# Patient Record
Sex: FEMALE | Race: ASIAN | NOT HISPANIC OR LATINO | URBAN - METROPOLITAN AREA
[De-identification: names, ages, dates, MRNs, and addresses within clinical notes are randomized per-mention and may not be internally consistent; named-entity substitution may affect disease eponyms.]

---

## 2019-04-21 ENCOUNTER — EMERGENCY (EMERGENCY)
Facility: HOSPITAL | Age: 54
LOS: 1 days | Discharge: HOME | End: 2019-04-21
Attending: EMERGENCY MEDICINE | Admitting: EMERGENCY MEDICINE
Payer: COMMERCIAL

## 2019-04-21 VITALS
SYSTOLIC BLOOD PRESSURE: 165 MMHG | OXYGEN SATURATION: 99 % | HEART RATE: 74 BPM | RESPIRATION RATE: 16 BRPM | TEMPERATURE: 98 F | DIASTOLIC BLOOD PRESSURE: 76 MMHG

## 2019-04-21 PROCEDURE — 73070 X-RAY EXAM OF ELBOW: CPT | Mod: 26,RT

## 2019-04-21 PROCEDURE — ZZZZZ: CPT

## 2019-04-21 PROCEDURE — 73090 X-RAY EXAM OF FOREARM: CPT | Mod: 26,RT

## 2019-04-21 PROCEDURE — 99283 EMERGENCY DEPT VISIT LOW MDM: CPT

## 2019-04-21 RX ORDER — ACETAMINOPHEN 500 MG
650 TABLET ORAL ONCE
Qty: 0 | Refills: 0 | Status: COMPLETED | OUTPATIENT
Start: 2019-04-21 | End: 2019-04-21

## 2019-04-21 RX ADMIN — Medication 650 MILLIGRAM(S): at 16:15

## 2019-04-21 NOTE — ED PROVIDER NOTE - PHYSICAL EXAMINATION
Vitals: WNL  Gen: laying comfortably in NAD  Head: NCAT  ENT: sclerae white, anicterus, moist mucous membranes. No exudates. Neck supple, no LAD,  no carotid bruits, no JVD  CV: RRR. Audible S1 and S2. No murmurs, rubs, gallops, S3, nor S4, 2+ radial and DP pulses   Pulm: Clear to auscultation bilaterally. No wheezes, rales, or rhonchi  Abd: soft, normoactive BS x4, NTND, no rebound, no guarding, no rashes  Musculoskeletal:  RUE: mild swelling R forearm, no tendneress to R elbow, no ecchymosis, no obvious defomity, motor/sensation of RUE intact,  strength b/l UE intact, no shoulder pain, full ROM R elbow  Skin: no lesions or scars noted  Neurologic: AAOx3

## 2019-04-21 NOTE — ED PROVIDER NOTE - OBJECTIVE STATEMENT
52yo F h/o htn p/w R forearm pain s/p fall, tripped on garden hose, didn't hit head, landed on R arm outstretched. since then has had pain in R arm, still able to move arm. hasn't taken anything for it. no shoulder pain. no numbness or weakness.

## 2019-04-21 NOTE — ED PROVIDER NOTE - NS ED ROS FT
Constitutional: no fevers, chills  HEENT: no visual changes, no sore throat, no rhinorrhea  CV: no cp  Resp: no sob  GI: no abd pain, n/v, diarrhea/constipation  : no dysuria, hematuria  MSK: +R forearm pain  skin: no rashes  neuro: no HA, no confusion  psych: no SI/HI  heme: no LAD

## 2019-04-21 NOTE — ED PROVIDER NOTE - ATTENDING CONTRIBUTION TO CARE
agree with resident note  "52yo F h/o htn p/w R forearm pain s/p fall, tripped on garden hose, didn't hit head, landed on R arm outstretched. since then has had pain in R arm, still able to move arm. hasn't taken anything for it. no shoulder pain. no numbness or weakness."    PE: well appearing; NCAT; ext: right arm no point tenderness but some swelling; no anatomical snuffbox tenderness    imp: contusion vs fx; will get xray and reassess

## 2019-04-21 NOTE — ED PROVIDER NOTE - CLINICAL SUMMARY MEDICAL DECISION MAKING FREE TEXT BOX
52yo F h/o htn p/w R forearm pain s/p fall, tripped on garden hose, didn't hit head, landed on R arm outstretched. xray, pain control.

## 2022-02-01 NOTE — ED PROVIDER NOTE - NS ED ATTENDING STATEMENT MOD
I have personally seen and examined this patient.  I have fully participated in the care of this patient. I have reviewed all pertinent clinical information, including history, physical exam, plan and the Resident’s note and agree except as noted.
36.2

## 2022-11-16 ENCOUNTER — INPATIENT (INPATIENT)
Facility: HOSPITAL | Age: 57
LOS: 1 days | Discharge: HOME | End: 2022-11-18
Attending: INTERNAL MEDICINE | Admitting: INTERNAL MEDICINE
Payer: COMMERCIAL

## 2022-11-16 VITALS
OXYGEN SATURATION: 100 % | DIASTOLIC BLOOD PRESSURE: 93 MMHG | HEART RATE: 84 BPM | SYSTOLIC BLOOD PRESSURE: 192 MMHG | RESPIRATION RATE: 17 BRPM | TEMPERATURE: 98 F

## 2022-11-16 LAB
ALBUMIN SERPL ELPH-MCNC: 5.2 G/DL — HIGH (ref 3.3–5)
ALP SERPL-CCNC: 102 U/L — SIGNIFICANT CHANGE UP (ref 40–120)
ALT FLD-CCNC: 36 U/L — HIGH (ref 4–33)
ANION GAP SERPL CALC-SCNC: 18 MMOL/L — HIGH (ref 7–14)
AST SERPL-CCNC: 34 U/L — HIGH (ref 4–32)
BASOPHILS # BLD AUTO: 0.03 K/UL — SIGNIFICANT CHANGE UP (ref 0–0.2)
BASOPHILS NFR BLD AUTO: 0.4 % — SIGNIFICANT CHANGE UP (ref 0–2)
BILIRUB SERPL-MCNC: 0.5 MG/DL — SIGNIFICANT CHANGE UP (ref 0.2–1.2)
BUN SERPL-MCNC: 11 MG/DL — SIGNIFICANT CHANGE UP (ref 7–23)
CALCIUM SERPL-MCNC: 10.4 MG/DL — SIGNIFICANT CHANGE UP (ref 8.4–10.5)
CHLORIDE SERPL-SCNC: 100 MMOL/L — SIGNIFICANT CHANGE UP (ref 98–107)
CO2 SERPL-SCNC: 22 MMOL/L — SIGNIFICANT CHANGE UP (ref 22–31)
CREAT SERPL-MCNC: 0.75 MG/DL — SIGNIFICANT CHANGE UP (ref 0.5–1.3)
EGFR: 93 ML/MIN/1.73M2 — SIGNIFICANT CHANGE UP
EOSINOPHIL # BLD AUTO: 0.1 K/UL — SIGNIFICANT CHANGE UP (ref 0–0.5)
EOSINOPHIL NFR BLD AUTO: 1.3 % — SIGNIFICANT CHANGE UP (ref 0–6)
FLUAV AG NPH QL: SIGNIFICANT CHANGE UP
FLUBV AG NPH QL: SIGNIFICANT CHANGE UP
GLUCOSE SERPL-MCNC: 95 MG/DL — SIGNIFICANT CHANGE UP (ref 70–99)
HCG SERPL-ACNC: <5 MIU/ML — SIGNIFICANT CHANGE UP
HCT VFR BLD CALC: 47.4 % — HIGH (ref 34.5–45)
HGB BLD-MCNC: 16.1 G/DL — HIGH (ref 11.5–15.5)
IANC: 4.08 K/UL — SIGNIFICANT CHANGE UP (ref 1.8–7.4)
IMM GRANULOCYTES NFR BLD AUTO: 0.3 % — SIGNIFICANT CHANGE UP (ref 0–0.9)
LIDOCAIN IGE QN: 54 U/L — SIGNIFICANT CHANGE UP (ref 7–60)
LYMPHOCYTES # BLD AUTO: 3.08 K/UL — SIGNIFICANT CHANGE UP (ref 1–3.3)
LYMPHOCYTES # BLD AUTO: 40.5 % — SIGNIFICANT CHANGE UP (ref 13–44)
MAGNESIUM SERPL-MCNC: 2.2 MG/DL — SIGNIFICANT CHANGE UP (ref 1.6–2.6)
MCHC RBC-ENTMCNC: 28.1 PG — SIGNIFICANT CHANGE UP (ref 27–34)
MCHC RBC-ENTMCNC: 34 GM/DL — SIGNIFICANT CHANGE UP (ref 32–36)
MCV RBC AUTO: 82.9 FL — SIGNIFICANT CHANGE UP (ref 80–100)
MONOCYTES # BLD AUTO: 0.3 K/UL — SIGNIFICANT CHANGE UP (ref 0–0.9)
MONOCYTES NFR BLD AUTO: 3.9 % — SIGNIFICANT CHANGE UP (ref 2–14)
NEUTROPHILS # BLD AUTO: 4.08 K/UL — SIGNIFICANT CHANGE UP (ref 1.8–7.4)
NEUTROPHILS NFR BLD AUTO: 53.6 % — SIGNIFICANT CHANGE UP (ref 43–77)
NRBC # BLD: 0 /100 WBCS — SIGNIFICANT CHANGE UP (ref 0–0)
NRBC # FLD: 0 K/UL — SIGNIFICANT CHANGE UP (ref 0–0)
NT-PROBNP SERPL-SCNC: 20 PG/ML — SIGNIFICANT CHANGE UP
PLATELET # BLD AUTO: 282 K/UL — SIGNIFICANT CHANGE UP (ref 150–400)
POTASSIUM SERPL-MCNC: 4.3 MMOL/L — SIGNIFICANT CHANGE UP (ref 3.5–5.3)
POTASSIUM SERPL-SCNC: 4.3 MMOL/L — SIGNIFICANT CHANGE UP (ref 3.5–5.3)
PROT SERPL-MCNC: 8.5 G/DL — HIGH (ref 6–8.3)
RBC # BLD: 5.72 M/UL — HIGH (ref 3.8–5.2)
RBC # FLD: 11.8 % — SIGNIFICANT CHANGE UP (ref 10.3–14.5)
RSV RNA NPH QL NAA+NON-PROBE: SIGNIFICANT CHANGE UP
SARS-COV-2 RNA SPEC QL NAA+PROBE: SIGNIFICANT CHANGE UP
SODIUM SERPL-SCNC: 140 MMOL/L — SIGNIFICANT CHANGE UP (ref 135–145)
TROPONIN T, HIGH SENSITIVITY RESULT: 9 NG/L — SIGNIFICANT CHANGE UP
WBC # BLD: 7.61 K/UL — SIGNIFICANT CHANGE UP (ref 3.8–10.5)
WBC # FLD AUTO: 7.61 K/UL — SIGNIFICANT CHANGE UP (ref 3.8–10.5)

## 2022-11-16 PROCEDURE — 93010 ELECTROCARDIOGRAM REPORT: CPT

## 2022-11-16 PROCEDURE — 99285 EMERGENCY DEPT VISIT HI MDM: CPT

## 2022-11-16 PROCEDURE — 71045 X-RAY EXAM CHEST 1 VIEW: CPT | Mod: 26

## 2022-11-16 RX ORDER — ASPIRIN/CALCIUM CARB/MAGNESIUM 324 MG
162 TABLET ORAL ONCE
Refills: 0 | Status: COMPLETED | OUTPATIENT
Start: 2022-11-16 | End: 2022-11-16

## 2022-11-16 RX ORDER — ACETAMINOPHEN 500 MG
650 TABLET ORAL ONCE
Refills: 0 | Status: COMPLETED | OUTPATIENT
Start: 2022-11-16 | End: 2022-11-16

## 2022-11-16 RX ADMIN — Medication 162 MILLIGRAM(S): at 21:59

## 2022-11-16 RX ADMIN — Medication 650 MILLIGRAM(S): at 21:59

## 2022-11-16 NOTE — ED ADULT NURSE NOTE - NSFALLRSKHARMRISK_ED_ALL_ED
Discussed the importance of follow up care and seeking immediate medical attention for any changes or worsening in the patients condition. Patient verbalized understanding of discharge paperwork, medication use and follow up care.      Patient discharged with scripts for Keflex and Pepcid no

## 2022-11-16 NOTE — ED ADULT NURSE NOTE - OBJECTIVE STATEMENT
Patient A&Ox4 ambulatory c/o chest pain described as "crushing pain" x2 months. Patient states usually occurs in AM, intense pain upon awakening, feels like crushing pain that lasts a few min. Denies cardiac work up. Hx HTN compliant with medications. On arrival to ED, patient no longer has chest pain. Endorsing headache. Respirations even and unlabored. Placed on cardiac monitor-NS. Patient denies sob, n/v/d, leg swelling, urinary symptoms and fvers/chills. 20G IV observed to left arm from EMS. Labs collected and sent to lab. Stretcher in lowest position, wheels locked, appropriate side rails in place, call bell in reach.

## 2022-11-16 NOTE — ED ADULT NURSE NOTE - CHIEF COMPLAINT QUOTE
pt arrives from cardiologist with c/o intermittent chest pain x 2 months. given 1 s/l nitro, 324 asa and #20 s/l L f/a

## 2022-11-16 NOTE — ED PROVIDER NOTE - PHYSICAL EXAMINATION
CONSTITUTIONAL: NAD  ENT: MMM  NECK: Supple; non tender.  CARD: RRR  RESP: CTAB  ABD: S/NT no R/G  EXT: no pedal edema  NEURO: Grossly unremarkable  PSYCH: Cooperative, appropriate. CONSTITUTIONAL: NAD  ENT: MMM  NECK: Supple; non tender.  CARD: RRR  RESP: CTAB  ABD: S/NT no R/G  EXT: no pedal edema  NEURO: Grossly unremarkable  PSYCH: Cooperative, appropriate.  ATTENDING PHYSICAL EXAM  GEN - NAD; well appearing; A+O x3  HEAD - NC/AT; EYES/NOSE - PERRL, EOMI, mucous membranes moist, no discharge; THROAT: Oral cavity and pharynx normal. No inflammation, swelling, exudate, or lesions  NECK: Neck supple, non-tender without lymphadenopathy, no masses, no JVD  PULMONARY - CTA b/l, symmetric breath sounds, no w/r/r  CARDIAC -s1s2, RRR, no M,R,G  ABDOMEN - +NABS, ND, NT, soft, no guarding, no rebound, no masses   BACK - no CVA tenderness, No vertebral or paravertebral tenderness  EXTREMITIES - symmetric pulses, 2+ dp, capillary refill < 2 seconds, no clubbing, no cyanosis, no edema  SKIN - no rash or bruising   NEUROLOGIC - alert, CN 2-12 intact, no focal deficits

## 2022-11-16 NOTE — ED PROVIDER NOTE - CLINICAL SUMMARY MEDICAL DECISION MAKING FREE TEXT BOX
Mirza PGY2: 58yo F with PMH of HTN presents to ED for eval of atypical AMchest pain but worsening and lengthing in intensity. Concern for ACS vs ?Prinzmetal vs ?GERD. EKG TWI in I, avL, V2-V5. Not actively in pain. Plan for ACS eval and admit to tele doc.      Star [phone]

## 2022-11-16 NOTE — ED PROVIDER NOTE - OBJECTIVE STATEMENT
58yo F with PMH of HTN presents to ED for eval of chest pain. For last 2mo she's had AM intense pain upon awakening, feels like crushing pain that lasts a few min. Since it always resolved, initially did not seek eval. Said it happens every day and if doesn't occur in morning feels she has some lesser episodes in the day instead. No cards hx, no smoking, does not know family hx. Assoc w/ nausea, but no diaphoresis/SOB. No exertional pains otherwise. However this has been progressively worsening in length and intensity so went to [facility]ologist. EKG today in office showed some TWI in lat leads so given /NG and sent to ED. No hx of heart burn. 56yo F with PMH of HTN presents to ED for eval of chest pain. For last 2mo she's had AM intense pain upon awakening, feels like crushing pain that lasts a few min. Since it always resolved, initially did not seek eval. Said it happens every day and if doesn't occur in morning feels she has some lesser episodes in the day instead. No cards hx, no smoking, does not know family hx. Assoc w/ nausea, but no diaphoresis/SOB. No exertional pains otherwise. However this has been progressively worsening in length and intensity so went to [facility]. EKG today in office showed some TWI in lat leads so given /NG and sent to ED. No hx of heart burn.  Attending - Agree with above.  I evaluated patient myself., 57-year-old female with past medical history of hypertension.  Denies history of coronary artery disease in past.  Reports episodes of midsternal chest pain that occurs almost every day since September.  Pain usually occurs in the morning after waking up.  Pain sometimes occurs at other times during the day.  Pain typically lasts for 5 to 10 minutes and resolve spontaneously.  Denies associated shortness of breath, diaphoresis, nausea, lightheadedness.  To PMD today for the first time for evaluation of this pain, and referred to ED for eval.  No pain currently.

## 2022-11-16 NOTE — ED PROVIDER NOTE - NS ED ROS FT
Constitutional:  See HPI  ENMT: No neck pain or stiffness  Cardiac:  +chest pain  Respiratory:  No cough or respiratory distress.   GI:  No nausea, vomiting, diarrhea or abdominal pain.  MS:  No back pain.  Except as documented in the HPI,  all other systems are negative

## 2022-11-17 DIAGNOSIS — Z90.49 ACQUIRED ABSENCE OF OTHER SPECIFIED PARTS OF DIGESTIVE TRACT: Chronic | ICD-10-CM

## 2022-11-17 DIAGNOSIS — Z98.891 HISTORY OF UTERINE SCAR FROM PREVIOUS SURGERY: Chronic | ICD-10-CM

## 2022-11-17 DIAGNOSIS — R07.9 CHEST PAIN, UNSPECIFIED: ICD-10-CM

## 2022-11-17 LAB
CK MB BLD-MCNC: 1.6 % — SIGNIFICANT CHANGE UP (ref 0–2.5)
CK MB CFR SERPL CALC: 1.1 NG/ML — SIGNIFICANT CHANGE UP
CK SERPL-CCNC: 70 U/L — SIGNIFICANT CHANGE UP (ref 25–170)
TROPONIN T, HIGH SENSITIVITY RESULT: 7 NG/L — SIGNIFICANT CHANGE UP

## 2022-11-17 PROCEDURE — 0715T: CPT

## 2022-11-17 PROCEDURE — 93018 CV STRESS TEST I&R ONLY: CPT | Mod: GC

## 2022-11-17 PROCEDURE — 93010 ELECTROCARDIOGRAM REPORT: CPT

## 2022-11-17 PROCEDURE — 78452 HT MUSCLE IMAGE SPECT MULT: CPT | Mod: 26

## 2022-11-17 PROCEDURE — 99222 1ST HOSP IP/OBS MODERATE 55: CPT

## 2022-11-17 PROCEDURE — 99236 HOSP IP/OBS SAME DATE HI 85: CPT

## 2022-11-17 PROCEDURE — 93016 CV STRESS TEST SUPVJ ONLY: CPT | Mod: GC

## 2022-11-17 PROCEDURE — 93306 TTE W/DOPPLER COMPLETE: CPT | Mod: 26

## 2022-11-17 RX ORDER — INFLUENZA VIRUS VACCINE 15; 15; 15; 15 UG/.5ML; UG/.5ML; UG/.5ML; UG/.5ML
0.5 SUSPENSION INTRAMUSCULAR ONCE
Refills: 0 | Status: DISCONTINUED | OUTPATIENT
Start: 2022-11-17 | End: 2022-11-18

## 2022-11-17 RX ORDER — AMLODIPINE BESYLATE 2.5 MG/1
10 TABLET ORAL DAILY
Refills: 0 | Status: DISCONTINUED | OUTPATIENT
Start: 2022-11-17 | End: 2022-11-18

## 2022-11-17 RX ORDER — ASPIRIN/CALCIUM CARB/MAGNESIUM 324 MG
81 TABLET ORAL DAILY
Refills: 0 | Status: DISCONTINUED | OUTPATIENT
Start: 2022-11-17 | End: 2022-11-18

## 2022-11-17 RX ORDER — CLOPIDOGREL BISULFATE 75 MG/1
75 TABLET, FILM COATED ORAL DAILY
Refills: 0 | Status: DISCONTINUED | OUTPATIENT
Start: 2022-11-17 | End: 2022-11-18

## 2022-11-17 RX ORDER — SODIUM CHLORIDE 9 MG/ML
1000 INJECTION INTRAMUSCULAR; INTRAVENOUS; SUBCUTANEOUS
Refills: 0 | Status: DISCONTINUED | OUTPATIENT
Start: 2022-11-17 | End: 2022-11-18

## 2022-11-17 RX ORDER — ATORVASTATIN CALCIUM 80 MG/1
40 TABLET, FILM COATED ORAL AT BEDTIME
Refills: 0 | Status: DISCONTINUED | OUTPATIENT
Start: 2022-11-17 | End: 2022-11-18

## 2022-11-17 RX ORDER — LISINOPRIL 2.5 MG/1
40 TABLET ORAL DAILY
Refills: 0 | Status: DISCONTINUED | OUTPATIENT
Start: 2022-11-17 | End: 2022-11-18

## 2022-11-17 RX ADMIN — LISINOPRIL 40 MILLIGRAM(S): 2.5 TABLET ORAL at 22:03

## 2022-11-17 RX ADMIN — AMLODIPINE BESYLATE 10 MILLIGRAM(S): 2.5 TABLET ORAL at 19:08

## 2022-11-17 RX ADMIN — SODIUM CHLORIDE 150 MILLILITER(S): 9 INJECTION INTRAMUSCULAR; INTRAVENOUS; SUBCUTANEOUS at 16:53

## 2022-11-17 RX ADMIN — ATORVASTATIN CALCIUM 40 MILLIGRAM(S): 80 TABLET, FILM COATED ORAL at 22:04

## 2022-11-17 NOTE — PATIENT PROFILE ADULT - FALL HARM RISK - HARM RISK INTERVENTIONS

## 2022-11-17 NOTE — CONSULT NOTE ADULT - SUBJECTIVE AND OBJECTIVE BOX
HPI:  admitted with several weeks of wakening with severe chest pressure  no associated symptoms ecg with STT changes laterally  echo normal  stress test cp with exertion st depression LAD ischemia    MEDICATIONS:  MEDICATIONS  (STANDING):      PHYSICAL EXAM:  T(C): 36.7 (11-17-22 @ 08:58), Max: 36.8 (11-16-22 @ 18:44)  HR: 71 (11-17-22 @ 08:58) (61 - 88)  BP: 153/79 (11-17-22 @ 08:58) (126/72 - 192/93)  RR: 18 (11-17-22 @ 08:58) (12 - 18)  SpO2: 100% (11-17-22 @ 08:58) (97% - 100%)  Wt(kg): --  I&O's Summary        Appearance: Normal	NAD  HEENT:   Normal oral mucosa, PERRL, EOMI	  Cardiovascular: Normal S1 S2, No JVD, No murmurs ,  Respiratory: Lungs clear to auscultation, normal effort 	  Gastrointestinal:  Soft, Non-tender, + BS	  Skin: No rashes, No ecchymoses, No cyanosis, warm to touch  Musculoskeletal: Normal range of motion, normal strength  Psychiatry:  Mood & affect appropriate  Ext: No edema  Peripheral pulses palpable 2+ bilaterally      LABS:    CARDIAC MARKERS:  CARDIAC MARKERS ( 17 Nov 2022 00:33 )  x     / x     / 70 U/L / x     / 1.1 ng/mL                                16.1   7.61  )-----------( 282      ( 16 Nov 2022 21:30 )             47.4     11-16    140  |  100  |  11  ----------------------------<  95  4.3   |  22  |  0.75    Ca    10.4      16 Nov 2022 21:30  Mg     2.20     11-16    TPro  8.5<H>  /  Alb  5.2<H>  /  TBili  0.5  /  DBili  x   /  AST  34<H>  /  ALT  36<H>  /  AlkPhos  102  11-16    proBNP: Serum Pro-Brain Natriuretic Peptide: 20 pg/mL (11-16 @ 21:30)    Lipid Profile:   HgA1c:   TSH:           TELEMETRY: 	    ECG: NSR nonspecific STT changes laterally 	  RADIOLOGY:   < from: Transthoracic Echocardiogram (11.17.22 @ 06:36) >  CONCLUSIONS:  1. Normal mitral valve. Minimal mitral regurgitation.  2. Normal left ventricular internal dimensions and wall  thicknesses.  3. Normal left ventricular systolic function. No segmental  wall motion abnormalities.  4. Mild diastolic dysfunction (Stage I).  5. Normal right ventricular size and function.  ------------------------------------------------------------------------  Confirmed on  11/17/2022 - 10:21:10 by Oren Ovalle M.D.,  [facility]  < from: Nuclear Stress Test-Exercise (Nuclear Stress Test-Exercise .) (11.17.22 @ 08:23) >  IMPRESSIONS:Abnormal Study  * Myocardial Perfusion SPECT results are abnormal.  * Reviewof raw data shows: The study is of good technical  quality.  * The left ventricle was small in size. There is a medium  sized, mild to moderate defect in anteroseptal wall  suggesting proximal LAD disease.  * Post-stress gated wall motion analysis was performed  (LVEF = 66 %;LVEDV = 46 ml.), revealing normal LV  function. There was mildly diminished anteroseptal  systolic thickening with normal wall motion. The other  segments and RV contracte well.  * NOTE: the patient developed grade 4 angina with low  level exercise. There was 1 mm horizontal ST-segment  depression in leads II , III, aVF, V4, V5, V6 started at  00:54 min of recovery at HR of 115 and persisted 03:50 min  into recovery  * Based upon the above findings, coronary angiography  suggested. Rest imaging will not be done.  ------------------------------------------------------------------------  Confirmed on  11/17/2022 - 11:03:37 by Malik Severino M.D.  ------------------------------------------------------------------------    < end of copied text >

## 2022-11-17 NOTE — ED CDU PROVIDER INITIAL DAY NOTE - PROGRESS NOTE DETAILS
SERGIO Payton: pt seen this AM by cardiology Dr Pickett advised to obtain stress and echo.  Stress lab called pt with +stress test, s/w Dr Pickett advised to admit to [facility] hospitalist.  Pt resting comfortably NAD, pt denies chest pain/sob. SERGIO Payton: pt seen this AM by cardiology Dr Pickett advised to obtain stress and echo.  Stress lab called pt with +stress test, s/w Dr Pickett advised to admit to [facility] hospitalist.  Pt resting comfortably NAD, pt denies chest pain/sob.  MAR notified.

## 2022-11-17 NOTE — CONSULT NOTE ADULT - ASSESSMENT
1. Chest pressure at rest  2. abnormal stress test with LAD ischemia  3. risk factors hypertension hyperlipidemia    Recommend  proceed with cardiac cath  risks benefits and alterantives discussed

## 2022-11-17 NOTE — ED CDU PROVIDER DISPOSITION NOTE - CLINICAL COURSE
57F h/o HTN p/w intermittent pressure-like chest pain upon awakening x2 months, becoming more frequent and a/w nausea. Pt assessed at PMD office prior to arrival and sent to ED due to change in EKG showing new TWI in lateral leads. In the ED, EKG NSR, TWI in avl and I ,V3 and V4, CXR unremarkable, troponin 9-->7, CBC and CMP unremarkable. Pt placed in CDU for tele monitoring, TTE, stress test, and cardiology evaluation. Pt underwent stress this AM and noted to have perfusion abnormality in LAD distribution with minimal activity. Cardiology rec forgoing resting imaging and proceeding to cardiac cath. D/w Dr. Pickett who had seen the pt this AM and agreed with plan. [facility], accepted by Dr. Gustafson.

## 2022-11-17 NOTE — ED CDU PROVIDER INITIAL DAY NOTE - PHYSICAL EXAMINATION
Vital signs reviewed.   CONSTITUTIONAL: Well-appearing; well-nourished; in no apparent distress. Non-toxic appearing.   HEAD: Normocephalic, atraumatic.  EYES: PERRL, EOM intact, conjunctiva and sclera WNL..  CARD: Normal S1, S2; no murmurs, rubs, or gallops noted.  RESP: Normal chest excursion with respiration; breath sounds clear and equal bilaterally; no wheezes, rhonchi, or rales.  ABD/GI: soft, non-distended; non-tender; no palpable organomegaly, no pulsatile mass.  EXT/MS: moves all extremities; distal pulses are normal, no pedal edema.  SKIN: Normal for age and race; warm; dry; good turgor; no apparent lesions or exudate noted.  NEURO: Awake, alert, oriented x 3, no gross deficits, CN II-XII grossly intact, no motor or sensory deficit noted. Vital signs reviewed.   CONSTITUTIONAL: Well-appearing; well-nourished; in no apparent distress. Non-toxic appearing.   HEAD: Normocephalic, atraumatic.  EYES: PERRL, EOM intact, conjunctiva and sclera WNL..  CARD: Normal S1, S2; no murmurs, rubs, or gallops noted.  RESP: Normal chest excursion with respiration; breath sounds clear and equal bilaterally; no wheezes, rhonchi, or rales.  ABD/GI: soft, non-distended; non-tender; no palpable organomegaly, no pulsatile mass.  EXT/MS: moves all extremities; distal pulses are normal, no pedal edema.  SKIN: Normal for age and race; warm; dry; good turgor; no apparent lesions or exudate noted.  NEURO: Awake, alert, oriented x 3, no gross deficits, CN II-XII grossly intact, no motor or sensory deficit noted.  ATTENDING PHYSICAL EXAM  GEN - NAD; well appearing; A+O x3  HEAD - NC/AT; EYES/NOSE - PERRL, EOMI, mucous membranes moist, no discharge; THROAT: Oral cavity and pharynx normal. No inflammation, swelling, exudate, or lesions  NECK: Neck supple, non-tender without lymphadenopathy, no masses, no JVD  PULMONARY - CTA b/l, symmetric breath sounds, no w/r/r  CARDIAC -s1s2, RRR, no M,R,G  ABDOMEN - +NABS, ND, NT, soft, no guarding, no rebound, no masses   BACK - no CVA tenderness, No vertebral or paravertebral tenderness  EXTREMITIES - symmetric pulses, 2+ dp, capillary refill < 2 seconds, no clubbing, no cyanosis, no edema

## 2022-11-17 NOTE — CHART NOTE - NSCHARTNOTEFT_GEN_A_CORE
Right radial site s/p LHC procedure is without bleeding or hematoma. Dressing dry, clean and intact. Vital signs stable. BL radial and brachial pulses palpable and symmetric. Sensation, strength, and ROM equal bilaterally. Patient denies chest pain, SOB, numbness, and tingling. Will continue to monitor.

## 2022-11-17 NOTE — ED CDU PROVIDER INITIAL DAY NOTE - MEDICAL DECISION MAKING DETAILS
57-year-old female non-smoker but reports a history of secondhand smoke exposure with a past medical history of hypertension presents the ER complaining of intermittent pressure-like chest pain upon awakening for the past 2 months, becoming more frequent associated nausea patient was evaluated by PMD today, EKG showing TWI in lateral leads patient sent to ER for evaluation.  In the ED patient EKG NSR, TWI in avl and I ,V3 and V4. chest x-ray clear lungs, troponin 9 and 7, CBC and CMP not acutely actionable.  Patient placed in CDU for telemetry, echocardiogram, stress test, telemetry doc of the day evaluation.  Patient reports no active symptoms/chest pain

## 2022-11-17 NOTE — PATIENT PROFILE ADULT - MONEY FOR FOOD
No complaints
no

## 2022-11-17 NOTE — H&P CARDIOLOGY - REVIEW OF SYSTEMS
The patient denies SOB, palpitations, dizziness, presyncope, syncope,  headache, visual disturbances, CVA, PE, DVT, MARIA INES, abdominal pain, N/V/D/C, hematochezia, melena, dysuria, hematuria, fever, chills.

## 2022-11-17 NOTE — ED CDU PROVIDER INITIAL DAY NOTE - OBJECTIVE STATEMENT
56yo F with PMH of HTN presents to ED for eval of chest pain. For last 2mo she's had AM intense pain upon awakening, feels like crushing pain that lasts a few min. Since it always resolved, initially did not seek eval. Said it happens every day and if doesn't occur in morning feels she has some lesser episodes in the day instead. No cards hx, no smoking, does not know family hx. Assoc w/ nausea, but no diaphoresis/SOB. No exertional pains otherwise. However this has been progressively worsening in length and intensity so went to [facility]. EKG today in office showed some TWI in lat leads so given /NG and sent to ED. No hx of heart burn    CDU SERGIO Estrada: Agree with above note.  Patient is a 57-year-old female non-smoker but reports a history of secondhand smoke exposure with a past medical history of hypertension presents the ER complaining of intermittent pressure-like chest pain upon awakening for the past 2 months, becoming more frequent associated nausea patient was evaluated by PMD today, EKG showing TWI in lateral leads patient sent to ER for evaluation. In the ED patient EKG NSR, TWI in avl and I ,V3 and V4, chest x-ray clear lungs, troponin 9 and 7, CBC and CMP not acutely actionable.  Patient placed in CDU for telemetry, echocardiogram, stress test, telemetry doc of the day evaluation.  Patient reports no active symptoms/chest pain 56yo F with PMH of HTN presents to ED for eval of chest pain. For last 2mo she's had AM intense pain upon awakening, feels like crushing pain that lasts a few min. Since it always resolved, initially did not seek eval. Said it happens every day and if doesn't occur in morning feels she has some lesser episodes in the day instead. No cards hx, no smoking, does not know family hx. Assoc w/ nausea, but no diaphoresis/SOB. No exertional pains otherwise. However this has been progressively worsening in length and intensity so went to [facility]. EKG today in office showed some TWI in lat leads so given /NG and sent to ED. No hx of heart burn    CDU SERGIO Estrada: Agree with above note.  Patient is a 57-year-old female non-smoker but reports a history of secondhand smoke exposure with a past medical history of hypertension presents the ER complaining of intermittent pressure-like chest pain upon awakening for the past 2 months, becoming more frequent associated nausea patient was evaluated by PMD today, EKG showing TWI in lateral leads patient sent to ER for evaluation. In the ED patient EKG NSR, TWI in avl and I ,V3 and V4, chest x-ray clear lungs, troponin 9 and 7, CBC and CMP not acutely actionable.  Patient placed in CDU for telemetry, echocardiogram, stress test, telemetry doc of the day evaluation.  Patient reports no active symptoms/chest pain  Attending - Agree with above.  I evaluated patient myself. 57-year-old female patient seen by myself in the emergency department.  Complains of episodes of chest pain that usually occur in the morning for past 2 months.  Evaluation done in ED unrevealing for STEMI.  Transferred to CDU for continued evaluation.

## 2022-11-17 NOTE — ED ADULT NURSE REASSESSMENT NOTE - NS ED NURSE REASSESS COMMENT FT1
Report given to CDU BAYRON Conley. Patient A&Ox4 ambulatory to CDU, steady gait observed, respirations even and unlabored, patient denies cp, sob, or any complaints at this time. IV line intact and patent.
Pt received from main ED, was oriended to room and unit, call bell provided. Pt denies SOB and chest pain. sinus rhythm on tele. safety maintained. will continue to monitor

## 2022-11-17 NOTE — H&P CARDIOLOGY - HISTORY OF PRESENT ILLNESS
57 y.o. female presents today to ER c/o chest pain since September, midernal, 8/10, aggravate while asleep, resolves on its own. The patient  SOB, palpitations, h/o chest trauma,  dizziness, presyncope, syncope,  headache, visual disturbances, CVA, PE, DVT, MARIA INES, abdominal pain, N/V/D/C, hematochezia, melena, dysuria, hematuria, fever, chills. The patient was evaluated by her PMD on 11/16/22, was noted to have abnormal ECG, referred to the ER via ambulance.   As per patient, stress test done many years ago- normal, as per patient.    57 y.o. female presents today to ER c[facility] since [facility], aggravate while asleep, resolves on its own. The patient  SOB, palpitations, h/o chest trauma,  dizziness, presyncope, syncope,  headache, visual disturbances, CVA, PE, DVT, MARIA INES, abdominal pain, N/V/D/C, hematochezia, melena, dysuria, hematuria, fever, chills. The patient was evaluated by her PMD on 11/16/22, was noted to have abnormal ECG, referred to the ER via ambulance.   As per patient, stress test done many years ago- normal, as per patient.       Stress test 11/17/22 Myocardial Perfusion SPECT results are abnormal.  * Review of raw data shows: The study is of good technical  quality.  * The left ventricle was small in size. There is a medium  sized, mild to moderate defect in anteroseptal wall  suggesting proximal LAD disease.  * Post-stress gated wall motion analysis was performed  (LVEF = 66 %;LVEDV = 46 ml.), revealing normal LV  function. There was mildly diminished anteroseptal  systolic thickening with normal wall motion. The other  segments and RV contracte well.  * NOTE: the patient developed grade 4 angina with low  level exercise. There was 1 mm horizontal ST-segment  depression in leads II , III, aVF, V4, V5, V6 started at  00:54 min of recovery at HR of 115 and persisted 03:50 min  into recovery  * Based upon the above findings, coronary angiography  suggested. Rest imaging will not be done.    Echo 11/17/22  Normal mitral valve. Minimal mitral regurgitation.  2. Normal left ventricular internal dimensions and wall  thicknesses.  3. Normal left ventricular systolic function. No segmental  wall motion abnormalities.  4. Mild diastolic dysfunction (Stage I).  5. Normal right ventricular size and function.

## 2022-11-18 ENCOUNTER — TRANSCRIPTION ENCOUNTER (OUTPATIENT)
Age: 57
End: 2022-11-18

## 2022-11-18 VITALS
TEMPERATURE: 98 F | DIASTOLIC BLOOD PRESSURE: 72 MMHG | HEART RATE: 69 BPM | RESPIRATION RATE: 18 BRPM | OXYGEN SATURATION: 100 % | SYSTOLIC BLOOD PRESSURE: 127 MMHG

## 2022-11-18 PROBLEM — Z00.00 ENCOUNTER FOR PREVENTIVE HEALTH EXAMINATION: Status: ACTIVE | Noted: 2022-11-18

## 2022-11-18 PROBLEM — E78.5 HYPERLIPIDEMIA, UNSPECIFIED: Chronic | Status: ACTIVE | Noted: 2022-11-17

## 2022-11-18 RX ORDER — CLOPIDOGREL BISULFATE 75 MG/1
1 TABLET, FILM COATED ORAL
Qty: 90 | Refills: 0
Start: 2022-11-18 | End: 2023-02-15

## 2022-11-18 RX ADMIN — CLOPIDOGREL BISULFATE 75 MILLIGRAM(S): 75 TABLET, FILM COATED ORAL at 11:38

## 2022-11-18 RX ADMIN — Medication 81 MILLIGRAM(S): at 11:38

## 2022-11-18 NOTE — DISCHARGE NOTE NURSING/CASE MANAGEMENT/SOCIAL WORK - PATIENT PORTAL LINK FT
You can access the FollowMyHealth Patient Portal offered by [facility] by registering at the following website: [email or web address]. By joining [facility] [employer] [facility]’s FollowMyHealth portal, you will also be able to view your health information using other applications (apps) compatible with our system.

## 2022-11-18 NOTE — DISCHARGE NOTE PROVIDER - HOSPITAL COURSE
57 y,o. female presented to ER c/o midsternal chest pain. Patient was found to have a positive stress test. Patient is s/p cardiac catheterization. Patient tolerated procedure well. Will discharge on aspirin and plavix. Follow-up with primary care doctor and cardiologist Dr. Pickett within 1-2 weeks of discharge.      * Myocardial Perfusion SPECT results are abnormal.  * Review of raw data shows: The study is of good technical  quality.  * The left ventricle was small in size. There is a medium  sized, mild to moderate defect in anteroseptal wall  suggesting proximal LAD disease.  * Post-stress gated wall motion analysis was performed  (LVEF = 66 %;LVEDV = 46 ml.), revealing normal LV  function. There was mildly diminished anteroseptal  systolic thickening with normal wall motion. The other  segments and RV contracte well.  * NOTE: the patient developed grade 4 angina with low  level exercise. There was 1 mm horizontal ST-segment  depression in leads II , III, aVF, V4, V5, V6 started at  00:54 min of recovery at HR of 115 and persisted 03:50 min  into recovery    Patient is medically cleared for discharge today on 11/18/22. Case discussed with Dr. Gustafson.       58 y/o. female presented to ER c/o midsternal chest pain. Patient was found to have a positive stress test. Stress test was notable for mild to moderate defect in anteroseptal wall suggesting LAD disease. LVEF 66%. Patient is s/p cardiac catheterization. LAD 90% stenosis and two stents were placed. Patient tolerated procedure well. Will discharge on aspirin and plavix. Follow-up with primary care doctor and cardiologist Dr. Pickett within 1-2 weeks of discharge.    Patient is medically cleared for discharge today on 11/18/22. Case discussed with Dr. Gustafson.

## 2022-11-18 NOTE — DISCHARGE NOTE PROVIDER - NSDCCPCAREPLAN_GEN_ALL_CORE_FT
PRINCIPAL DISCHARGE DIAGNOSIS  Diagnosis: S/P cardiac catheterization  Assessment and Plan of Treatment: You had a stress test which was abnormal and suggestive of coronary artery disease. You had a cardiac catheterization and two stents were placed.   Please continue to take aspirin and plavix as directed.   Follow-up with your primary care doctor and Dr. Pickett within 2 weeks of discharge.  Activity: Rest today. You may drive in 24 hours. NO strenuous activity, straining, heavy lifting, pushing or pulling for 3 days.   Bandage: Keep bandage clean and dry. Remove after 24 hours.    Bathing: You may shower tomorrow. No baths/sitting in pools for 7 days.  Medications: Take those medicines reviewed today with you on the medication reconciliation sheet.   Diet: See instructions above. Drink at least 8 glasses of water today unless you are told otherwise.   Special Instructions: Avoid reaching or placing too much pressure on that arm or wrist for 48 hours.   If you have bleeding from the procedure site: Lie down and remove the bandage. Apply hard pressure and call your doctor. If bleeding and swelling cannot be controlled, call 911.  Call your doctor immediately if: 1) You have severe pain, swelling, or bruising at the procedure site. A small amount or soreness and bruising (black and blue) is normal. 2) Procedure site becomes very red or feels hot to touch. 3) Your hand becomes blue or feels cold to touch. 4)You feel sudden back or belly pain. 5)You develop fever.  Contact information: If you are unable to reach your Doctor, call the cardiology office at [facility] [phone] (Monday-Friday 8am-5pm). After 5pm and on weekends, please call , and ask for "cardiology fellow".

## 2022-11-18 NOTE — DISCHARGE NOTE PROVIDER - CARE PROVIDER_API CALL
Pavan Pickett (MD)  Cardiovascular Disease; Internal Medicine; Interventional Cardiology  [address] [place] [address]  Phone: [phone]  Fax: [phone]  Follow Up Time:     Nancy Ritchie  INTERNAL MEDICINE  [address] [facility], [address]  [place] [address]  Phone: [phone]  Fax: [phone]  Follow Up Time:

## 2022-11-18 NOTE — DISCHARGE NOTE PROVIDER - NSDCMRMEDTOKEN_GEN_ALL_CORE_FT
amlodipine-benazepril 10 mg-40 mg oral capsule: 1 cap(s) orally once a day  Aspirin Enteric Coated 81 mg oral delayed release tablet: 1 tab(s) orally once a day  atorvastatin 20 mg oral tablet: 1 tab(s) orally once a day  CoQ10:   Vitamin D3:    amlodipine-benazepril 10 mg-40 mg oral capsule: 1 cap(s) orally once a day  Aspirin Enteric Coated 81 mg oral delayed release tablet: 1 tab(s) orally once a day  atorvastatin 20 mg oral tablet: 1 tab(s) orally once a day  clopidogrel 75 mg oral tablet: 1 tab(s) orally once a day  CoQ10:   Vitamin D3:

## 2022-11-18 NOTE — DISCHARGE NOTE NURSING/CASE MANAGEMENT/SOCIAL WORK - NSDCPEFALRISK_GEN_ALL_CORE
For information on Fall & Injury Prevention, visit: [email or web address] OR  [email or web address] OR  https://www.cdc.gov/steadi/patient.html

## 2022-12-06 ENCOUNTER — APPOINTMENT (OUTPATIENT)
Dept: CARDIOLOGY | Facility: CLINIC | Age: 57
End: 2022-12-06

## 2022-12-06 ENCOUNTER — NON-APPOINTMENT (OUTPATIENT)
Age: 57
End: 2022-12-06

## 2022-12-06 VITALS
HEART RATE: 70 BPM | WEIGHT: 131 LBS | HEIGHT: 60 IN | SYSTOLIC BLOOD PRESSURE: 120 MMHG | DIASTOLIC BLOOD PRESSURE: 72 MMHG | BODY MASS INDEX: 25.72 KG/M2 | OXYGEN SATURATION: 98 % | RESPIRATION RATE: 17 BRPM

## 2022-12-06 PROCEDURE — 99214 OFFICE O/P EST MOD 30 MIN: CPT | Mod: 25

## 2022-12-06 PROCEDURE — 93000 ELECTROCARDIOGRAM COMPLETE: CPT

## 2022-12-06 NOTE — HISTORY OF PRESENT ILLNESS
[FreeTextEntry1] : Ansley Lan   Is an overall healthy 57-year-old with a history of hypertension and hyperlipidemia.  She has been in good health without any medical issues and without any significant hospitalizations\par \par  she presented to her internist Dr. Nancy Ritchie with recurrent episodes of awakening with severe chest pain and new EKG changes with ST-T wave changes in the precordial leads suggestive of ischemia.\par \par   She was admitted to [facility].  Her enzymes were normal.  Patient underwent a nuclear stress test and had ST segment depression during exercise chest pain and had evidence of LAD ischemia\par \par  cardiac catheterization on November 18, 2022 revealed severe subtotal occlusion of the mid LAD.  Rest of the vessels were unremarkable.  She underwent successful stenting of the mid LAD with a drug-eluting stent.  She was discharged 24 hours later\par \par .  Since then she has been asymptomatic without chest pain chest pressure shortness of breath.  She has had no episodes of either exertional or awakening at rest chest pain that she had prior to the PTCI\par \par  she was done from the right radial and had some pain in her right radial did not go back to work because of the pain but this is now resolved\par .\par   Her present medications include  atorvastatin, Plavix 75 aspirin 81 amlodipine benazepril 10-40 co-Q10\par \par  EKG dated December 6, 2022 normal sinus rhythm minor nonspecific ST-T wave changes.  There are no T inversions now in the precordial leads that were there at the time of her admission to [facility]

## 2022-12-06 NOTE — ASSESSMENT
[FreeTextEntry1] : Dilshad Lan  several weeks status post PTCI the LAD for unstable angina.  She is presently asymptomatic.  Physical exam is normal and EKG with some very minor nonspecific ST-T wave changes.\par \par \par 1.  Ischemic heart disease status post PTCI of the mid LAD for unstable angina\par \par  2.  History of hypertension well-controlled on amlodipine benazepril 10–40\par \par 3.  Mixed hyperlipoidemia on statin LDL should be brought down to below 55

## 2022-12-06 NOTE — PHYSICAL EXAM
[Well Developed] : well developed [Well Nourished] : well nourished [No Acute Distress] : no acute distress [Normal Conjunctiva] : normal conjunctiva [No Carotid Bruit] : no carotid bruit [Normal S1, S2] : normal S1, S2 [No Murmur] : no murmur [Clear Lung Fields] : clear lung fields [Soft] : abdomen soft [Non Tender] : non-tender [No Edema] : no edema [de-identified] :  in good

## 2022-12-06 NOTE — DISCUSSION/SUMMARY
[EKG obtained to assist in diagnosis and management of assessed problem(s)] : EKG obtained to assist in diagnosis and management of assessed problem(s) [FreeTextEntry1] :  overall the patient is doing extremely well.  I encouraged to increase her level of exercise and she can return to work.\par \par   She should have repeat blood work within 3 months and if her LDL is not at the desired range, Lipitor should be increased as needed to achieve the goal\par \par  patient will follow with me again in 3

## 2022-12-06 NOTE — REASON FOR VISIT
[FreeTextEntry1] : Ansley Lan 57 years old recently underwent cardiac catheterization for unstable angina.

## 2023-03-08 ENCOUNTER — APPOINTMENT (OUTPATIENT)
Dept: CARDIOLOGY | Facility: CLINIC | Age: 58
End: 2023-03-08
Payer: COMMERCIAL

## 2023-03-08 ENCOUNTER — NON-APPOINTMENT (OUTPATIENT)
Age: 58
End: 2023-03-08

## 2023-03-08 VITALS
DIASTOLIC BLOOD PRESSURE: 70 MMHG | OXYGEN SATURATION: 98 % | WEIGHT: 127 LBS | BODY MASS INDEX: 24.8 KG/M2 | HEART RATE: 67 BPM | SYSTOLIC BLOOD PRESSURE: 110 MMHG

## 2023-03-08 PROCEDURE — 93000 ELECTROCARDIOGRAM COMPLETE: CPT

## 2023-03-08 PROCEDURE — 99214 OFFICE O/P EST MOD 30 MIN: CPT | Mod: 25

## 2023-03-08 NOTE — REASON FOR VISIT
[FreeTextEntry1] : Ansley Lan 57 years old here for follow-up of her cardiac status.  She is several months status post PTCI of the mid LAD lesion.

## 2023-03-08 NOTE — DISCUSSION/SUMMARY
[FreeTextEntry1] : \par 1.  Routine follow-up with me in 6 months unless is a change in his status\par  2.  Continue dual antiplatelet therapy for 1 year and then she can either stop aspirin 81 or Plavix 75 but not both\par  3.  I told the patient she does not need SBE prophylaxis prior to dental work.\par \par   Overall clinically she is doing extremely well\par  I will obtain her most recent blood work from Dr. Brown [EKG obtained to assist in diagnosis and management of assessed problem(s)] : EKG obtained to assist in diagnosis and management of assessed problem(s)

## 2023-03-08 NOTE — PHYSICAL EXAM
[Well Developed] : well developed [Well Nourished] : well nourished [No Acute Distress] : no acute distress [Normal Conjunctiva] : normal conjunctiva [No Carotid Bruit] : no carotid bruit [Normal S1, S2] : normal S1, S2 [No Murmur] : no murmur [Clear Lung Fields] : clear lung fields [Soft] : abdomen soft [Non Tender] : non-tender [No Edema] : no edema [de-identified] :  in good

## 2023-03-08 NOTE — HISTORY OF PRESENT ILLNESS
[FreeTextEntry1] : Ansley Lan   Is an overall healthy 57-year-old with a history of hypertension and hyperlipidemia.  She has been in good health without any medical issues and without any significant hospitalizations\par \par  she presented to her internist Dr. Nancy Ritchie with recurrent episodes of awakening with severe chest pain and new EKG changes with ST-T wave changes in the precordial leads suggestive of ischemia.\par \par   She was admitted to [facility].  Her enzymes were normal.  Patient underwent a nuclear stress test and had ST segment depression during exercise chest pain and had evidence of LAD ischemia\par \par  cardiac catheterization on November 18, 2022 revealed severe subtotal occlusion of the mid LAD.  Rest of the vessels were unremarkable.  She underwent successful stenting of the mid LAD with a drug-eluting stent.  She was discharged 24 hours later\par \par .  Since then she has been asymptomatic without chest pain chest pressure shortness of breath.  She has had no episodes of either exertional or awakening at rest chest pain that she had prior to the PTCI\par \par  she was done from the right radial and had some pain in her right radial did not go back to work because of the pain but this is now resolved\par .\par   Her present medications include  atorvastatin, Plavix 75 aspirin 81 amlodipine benazepril 10-40 co-Q10\par \par  EKG dated December 6, 2022 normal sinus rhythm minor nonspecific ST-T wave changes.  There are no T inversions now in the precordial leads that were there at the time of her admission to [facility]\par \par Visit March 8, 2023: Patient is several months status post PTCI the LAD.  She feels extremely well without chest pain chest pressure shortness of breath.  She has no exertional dizziness no exertional chest pain.  She exercises regularly without difficulty.  Apparently her blood work is in excellent order with Dr. Ritchie\par \par  EKG March 8, 2023 normal sinus rhythm minimal nonspecific ST-T wave changes probably within normal limits

## 2023-07-26 NOTE — H&P CARDIOLOGY - SOURCE
[facility] [employer]  OPERATIVE REPORT    Name:  Raj Marrufo  MR#:  [id number]  :  1981  ACCOUNT #:  [de-identified]  DATE OF SERVICE:  2023    PREOPERATIVE DIAGNOSIS:  Left laryngeal lesion. POSTOPERATIVE DIAGNOSIS:  Left laryngeal lesion    PROCEDURE PERFORMED:  Suspension microlaryngoscopy with excisional biopsy of left vocal lesion. SURGEON:  Prachi Oh. Isaiah Gee MD    ASSISTANT:  none. ANESTHESIA:  General endotracheal.    ANESTHESIOLOGIST:  Michelle Davidson MD    COMPLICATIONS:  None. SPECIMENS REMOVED:  Left vocal lesion - permanent. IMPLANTS:  none. ESTIMATED BLOOD LOSS:  5 mL. OPERATIVE FINDINGS:  1. There was a 4-5 mm whitish raised lesion just posterior to the left vocal fold overlying the vocal process. Excisional biopsy was performed. 2.  There were no other concerning lesions along the larynx or pharynx. IV FLUID:  500 mL crystalloid. DRAINS:   None. DISPOSITION:  PACU, then home. CONDITION:  Stable. BRIEF HISTORY:  The patient is a 45-year-old female who presented to my office with worsening hoarseness and sore throat. She was noted to have a concerning lesion along the left vocal process. She was treated with anti-reflux therapy and even underwent speech therapy with minimal relief. The decision was made to take her to the operating room for excisional biopsy of this left vocal lesion. DESCRIPTION OF PROCEDURE:  The patient was brought back to the operating room and placed on the table in the supine position. General endotracheal anesthesia was inducted without complications. Her larynx was fairly anterior in nature and a 6.5-sized endotracheal tube was placed using the assistance of the GlideScope. Once the patient was adequately sedated, the end of the table was turned 90 degrees counterclockwise and she was sterilely prepped and draped in the usual fashion.     After placing a maxillary dental guard, I introduced the Dedo laryngoscope down Patient

## 2023-08-01 ENCOUNTER — APPOINTMENT (OUTPATIENT)
Dept: CARDIOLOGY | Facility: CLINIC | Age: 58
End: 2023-08-01
Payer: COMMERCIAL

## 2023-08-01 VITALS
WEIGHT: 127 LBS | OXYGEN SATURATION: 100 % | HEART RATE: 68 BPM | BODY MASS INDEX: 24.94 KG/M2 | HEIGHT: 60 IN | SYSTOLIC BLOOD PRESSURE: 134 MMHG | DIASTOLIC BLOOD PRESSURE: 78 MMHG

## 2023-08-01 PROCEDURE — 93000 ELECTROCARDIOGRAM COMPLETE: CPT

## 2023-08-01 PROCEDURE — 99214 OFFICE O/P EST MOD 30 MIN: CPT | Mod: 25

## 2023-08-01 NOTE — ASSESSMENT
[FreeTextEntry1] : Dilshad Lan  several weeks status post PTCI the LAD for unstable angina.  She is presently asymptomatic.  Physical exam is normal and EKG with some very minor nonspecific ST-T wave changes. EKG is today is probably normal with some very minor nonspecific EKG changes.  The patient recently developed recurrent chest tightness with vigorous bicycle riding and has had 1-2 episodes of chest tightness at rest reminiscent of the symptoms she had prior to the PTCI   1.  Ischemic heart disease status post PTCI of the mid LAD for unstable angina.  Patient has had recurrent symptoms of chest pressure suggestive of possible restenosis of the LAD   2.  History of hypertension well-controlled on amlodipine benazepril 10-40  3.  Mixed hyperlipoidemia on statin LDL should be brought down to below 55  The patient has had a change in symptoms though they are not clear if they truly are related to underlying coronary disease but she has a stent in the LAD and had similar symptoms in the past when she required a stent to the LAD she is otherwise healthy without significant risk factors

## 2023-08-01 NOTE — DISCUSSION/SUMMARY
[FreeTextEntry1] : Although it is unlikely that she has restenosis of the LAD, the symptoms are rather compelling.  I therefore recommended that she undergo a repeat diagnostic cardiac catheterization which I am scheduling for August 4, 2023 at [facility] [EKG obtained to assist in diagnosis and management of assessed problem(s)] : EKG obtained to assist in diagnosis and management of assessed problem(s)

## 2023-08-01 NOTE — REASON FOR VISIT
[FreeTextEntry1] : Ansley Lan 58 years old s/P PTCI of LAD november 2002 with recurrent chest [id number] after bicycle riding and now episodes at rest in AM

## 2023-08-01 NOTE — HISTORY OF PRESENT ILLNESS
[FreeTextEntry1] : Ansley Lan   Is an overall healthy 57-year-old with a history of hypertension and hyperlipidemia.  She has been in good health without any medical issues and without any significant hospitalizations   she presented to her internist Dr. Nancy Ritchie with recurrent episodes of awakening with severe chest pain and new EKG changes with ST-T wave changes in the precordial leads suggestive of ischemia.    She was admitted to [facility].  Her enzymes were normal.  Patient underwent a nuclear stress test and had ST segment depression during exercise chest pain and had evidence of LAD ischemia   cardiac catheterization on November 18, 2022 revealed severe subtotal occlusion of the mid LAD.  Rest of the vessels were unremarkable.  She underwent successful stenting of the mid LAD with a drug-eluting stent.  She was discharged 24 hours later  .  Since then she has been asymptomatic without chest pain chest pressure shortness of breath.  She has had no episodes of either exertional or awakening at rest chest pain that she had prior to the PTCI   she was done from the right radial and had some pain in her right radial did not go back to work because of the pain but this is now resolved .   Her present medications include  atorvastatin, Plavix 75 aspirin 81 amlodipine benazepril 10-40 co-Q10   EKG dated December 6, 2022 normal sinus rhythm minor nonspecific ST-T wave changes.  There are no T inversions now in the precordial leads that were there at the time of her admission to [facility]  Visit March 8, 2023: Patient is several months status post PTCI the LAD.  She feels extremely well without chest pain chest pressure shortness of breath.  She has no exertional dizziness no exertional chest pain.  She exercises regularly without difficulty.  Apparently her blood work is in excellent order with Dr. Ritchie   EKG March 8, 2023 normal sinus rhythm minimal nonspecific ST-T wave changes probably within normal limits  Visit August 1, 2023:: The patient has been doing quite well.  She went for a vigorous bicycle ride with her  and felt quite fatigued and short of breath and had chest tightness similar to what she had prior to a PTCI the LAD.  Since then she has had some recurrent episodes in the morning of chest tightness.  She did go like bicycle riding recently without any discomfort.  The symptoms are quite reminiscent of what she had when she presented in November 2022 when she had a stent placed to the LAD  EKG August 1, 2023 normal sinus rhythm some nonspecific ST-T wave changes unchanged from prior EKG

## 2023-08-01 NOTE — PHYSICAL EXAM
[Well Developed] : well developed [Well Nourished] : well nourished [No Acute Distress] : no acute distress [Normal Conjunctiva] : normal conjunctiva [No Carotid Bruit] : no carotid bruit [Normal S1, S2] : normal S1, S2 [No Murmur] : no murmur [Clear Lung Fields] : clear lung fields [Soft] : abdomen soft [Non Tender] : non-tender [No Edema] : no edema [de-identified] : Anxious but no acute distress

## 2023-08-04 ENCOUNTER — INPATIENT (INPATIENT)
Facility: HOSPITAL | Age: 58
LOS: 0 days | Discharge: HOME | End: 2023-08-05
Attending: INTERNAL MEDICINE | Admitting: INTERNAL MEDICINE
Payer: COMMERCIAL

## 2023-08-04 VITALS — WEIGHT: 128.09 LBS | HEIGHT: 60 IN

## 2023-08-04 DIAGNOSIS — Z95.5 PRESENCE OF CORONARY ANGIOPLASTY IMPLANT AND GRAFT: Chronic | ICD-10-CM

## 2023-08-04 DIAGNOSIS — Z98.891 HISTORY OF UTERINE SCAR FROM PREVIOUS SURGERY: Chronic | ICD-10-CM

## 2023-08-04 DIAGNOSIS — Z95.5 PRESENCE OF CORONARY ANGIOPLASTY IMPLANT AND GRAFT: ICD-10-CM

## 2023-08-04 DIAGNOSIS — Z90.49 ACQUIRED ABSENCE OF OTHER SPECIFIED PARTS OF DIGESTIVE TRACT: Chronic | ICD-10-CM

## 2023-08-04 LAB
ALBUMIN SERPL ELPH-MCNC: 4.7 G/DL — SIGNIFICANT CHANGE UP (ref 3.3–5)
ALP SERPL-CCNC: 94 U/L — SIGNIFICANT CHANGE UP (ref 40–120)
ALT FLD-CCNC: 20 U/L — SIGNIFICANT CHANGE UP (ref 4–33)
ANION GAP SERPL CALC-SCNC: 9 MMOL/L — SIGNIFICANT CHANGE UP (ref 7–14)
AST SERPL-CCNC: 18 U/L — SIGNIFICANT CHANGE UP (ref 4–32)
BILIRUB SERPL-MCNC: 0.6 MG/DL — SIGNIFICANT CHANGE UP (ref 0.2–1.2)
BUN SERPL-MCNC: 17 MG/DL — SIGNIFICANT CHANGE UP (ref 7–23)
CALCIUM SERPL-MCNC: 9.8 MG/DL — SIGNIFICANT CHANGE UP (ref 8.4–10.5)
CHLORIDE SERPL-SCNC: 107 MMOL/L — SIGNIFICANT CHANGE UP (ref 98–107)
CO2 SERPL-SCNC: 28 MMOL/L — SIGNIFICANT CHANGE UP (ref 22–31)
CREAT SERPL-MCNC: 1.11 MG/DL — SIGNIFICANT CHANGE UP (ref 0.5–1.3)
EGFR: 58 ML/MIN/1.73M2 — LOW
GLUCOSE SERPL-MCNC: 109 MG/DL — HIGH (ref 70–99)
HCT VFR BLD CALC: 39 % — SIGNIFICANT CHANGE UP (ref 34.5–45)
HGB BLD-MCNC: 13 G/DL — SIGNIFICANT CHANGE UP (ref 11.5–15.5)
MAGNESIUM SERPL-MCNC: 2.2 MG/DL — SIGNIFICANT CHANGE UP (ref 1.6–2.6)
MCHC RBC-ENTMCNC: 28.4 PG — SIGNIFICANT CHANGE UP (ref 27–34)
MCHC RBC-ENTMCNC: 33.3 GM/DL — SIGNIFICANT CHANGE UP (ref 32–36)
MCV RBC AUTO: 85.3 FL — SIGNIFICANT CHANGE UP (ref 80–100)
NRBC # BLD: 0 /100 WBCS — SIGNIFICANT CHANGE UP (ref 0–0)
NRBC # FLD: 0 K/UL — SIGNIFICANT CHANGE UP (ref 0–0)
PHOSPHATE SERPL-MCNC: 3.5 MG/DL — SIGNIFICANT CHANGE UP (ref 2.5–4.5)
PLATELET # BLD AUTO: 236 K/UL — SIGNIFICANT CHANGE UP (ref 150–400)
POTASSIUM SERPL-MCNC: 4.2 MMOL/L — SIGNIFICANT CHANGE UP (ref 3.5–5.3)
POTASSIUM SERPL-SCNC: 4.2 MMOL/L — SIGNIFICANT CHANGE UP (ref 3.5–5.3)
PROT SERPL-MCNC: 7.6 G/DL — SIGNIFICANT CHANGE UP (ref 6–8.3)
RBC # BLD: 4.57 M/UL — SIGNIFICANT CHANGE UP (ref 3.8–5.2)
RBC # FLD: 12.1 % — SIGNIFICANT CHANGE UP (ref 10.3–14.5)
SODIUM SERPL-SCNC: 144 MMOL/L — SIGNIFICANT CHANGE UP (ref 135–145)
WBC # BLD: 7.07 K/UL — SIGNIFICANT CHANGE UP (ref 3.8–10.5)
WBC # FLD AUTO: 7.07 K/UL — SIGNIFICANT CHANGE UP (ref 3.8–10.5)

## 2023-08-04 PROCEDURE — 92928 PRQ TCAT PLMT NTRAC ST 1 LES: CPT | Mod: LD

## 2023-08-04 PROCEDURE — 93010 ELECTROCARDIOGRAM REPORT: CPT | Mod: 77,59

## 2023-08-04 PROCEDURE — 93458 L HRT ARTERY/VENTRICLE ANGIO: CPT | Mod: 26,59

## 2023-08-04 PROCEDURE — 93010 ELECTROCARDIOGRAM REPORT: CPT | Mod: 76,59

## 2023-08-04 PROCEDURE — 99152 MOD SED SAME PHYS/QHP 5/>YRS: CPT

## 2023-08-04 RX ORDER — ATORVASTATIN CALCIUM 80 MG/1
1 TABLET, FILM COATED ORAL
Qty: 0 | Refills: 0 | DISCHARGE

## 2023-08-04 RX ORDER — SODIUM CHLORIDE 9 MG/ML
250 INJECTION INTRAMUSCULAR; INTRAVENOUS; SUBCUTANEOUS
Refills: 0 | Status: DISCONTINUED | OUTPATIENT
Start: 2023-08-04 | End: 2023-08-05

## 2023-08-04 RX ORDER — ATORVASTATIN CALCIUM 80 MG/1
40 TABLET, FILM COATED ORAL AT BEDTIME
Refills: 0 | Status: DISCONTINUED | OUTPATIENT
Start: 2023-08-04 | End: 2023-08-05

## 2023-08-04 RX ORDER — ONDANSETRON 8 MG/1
4 TABLET, FILM COATED ORAL ONCE
Refills: 0 | Status: COMPLETED | OUTPATIENT
Start: 2023-08-04 | End: 2023-08-04

## 2023-08-04 RX ORDER — SODIUM CHLORIDE 9 MG/ML
1000 INJECTION INTRAMUSCULAR; INTRAVENOUS; SUBCUTANEOUS
Refills: 0 | Status: DISCONTINUED | OUTPATIENT
Start: 2023-08-04 | End: 2023-08-05

## 2023-08-04 RX ORDER — CHOLECALCIFEROL (VITAMIN D3) 125 MCG
1000 CAPSULE ORAL DAILY
Refills: 0 | Status: DISCONTINUED | OUTPATIENT
Start: 2023-08-04 | End: 2023-08-05

## 2023-08-04 RX ORDER — LISINOPRIL 2.5 MG/1
40 TABLET ORAL DAILY
Refills: 0 | Status: DISCONTINUED | OUTPATIENT
Start: 2023-08-05 | End: 2023-08-05

## 2023-08-04 RX ORDER — ACETAMINOPHEN 500 MG
1000 TABLET ORAL ONCE
Refills: 0 | Status: COMPLETED | OUTPATIENT
Start: 2023-08-04 | End: 2023-08-04

## 2023-08-04 RX ORDER — ASPIRIN/CALCIUM CARB/MAGNESIUM 324 MG
81 TABLET ORAL DAILY
Refills: 0 | Status: DISCONTINUED | OUTPATIENT
Start: 2023-08-05 | End: 2023-08-05

## 2023-08-04 RX ORDER — TICAGRELOR 90 MG/1
90 TABLET ORAL EVERY 12 HOURS
Refills: 0 | Status: DISCONTINUED | OUTPATIENT
Start: 2023-08-04 | End: 2023-08-05

## 2023-08-04 RX ORDER — UBIDECARENONE 100 MG
0 CAPSULE ORAL
Qty: 0 | Refills: 0 | DISCHARGE

## 2023-08-04 RX ORDER — SODIUM CHLORIDE 9 MG/ML
3 INJECTION INTRAMUSCULAR; INTRAVENOUS; SUBCUTANEOUS EVERY 8 HOURS
Refills: 0 | Status: DISCONTINUED | OUTPATIENT
Start: 2023-08-04 | End: 2023-08-05

## 2023-08-04 RX ORDER — CHOLECALCIFEROL (VITAMIN D3) 125 MCG
0 CAPSULE ORAL
Qty: 0 | Refills: 0 | DISCHARGE

## 2023-08-04 RX ORDER — FENTANYL CITRATE 50 UG/ML
25 INJECTION INTRAVENOUS ONCE
Refills: 0 | Status: DISCONTINUED | OUTPATIENT
Start: 2023-08-04 | End: 2023-08-04

## 2023-08-04 RX ORDER — AMLODIPINE BESYLATE 2.5 MG/1
10 TABLET ORAL DAILY
Refills: 0 | Status: DISCONTINUED | OUTPATIENT
Start: 2023-08-05 | End: 2023-08-05

## 2023-08-04 RX ORDER — SODIUM CHLORIDE 9 MG/ML
250 INJECTION INTRAMUSCULAR; INTRAVENOUS; SUBCUTANEOUS ONCE
Refills: 0 | Status: DISCONTINUED | OUTPATIENT
Start: 2023-08-04 | End: 2023-08-05

## 2023-08-04 RX ORDER — TICAGRELOR 90 MG/1
1 TABLET ORAL
Qty: 60 | Refills: 0
Start: 2023-08-04 | End: 2023-09-02

## 2023-08-04 RX ADMIN — ONDANSETRON 4 MILLIGRAM(S): 8 TABLET, FILM COATED ORAL at 12:19

## 2023-08-04 RX ADMIN — TICAGRELOR 90 MILLIGRAM(S): 90 TABLET ORAL at 22:01

## 2023-08-04 RX ADMIN — SODIUM CHLORIDE 100 MILLILITER(S): 9 INJECTION INTRAMUSCULAR; INTRAVENOUS; SUBCUTANEOUS at 10:01

## 2023-08-04 RX ADMIN — Medication 400 MILLIGRAM(S): at 12:18

## 2023-08-04 RX ADMIN — ATORVASTATIN CALCIUM 40 MILLIGRAM(S): 80 TABLET, FILM COATED ORAL at 23:19

## 2023-08-04 RX ADMIN — SODIUM CHLORIDE 3 MILLILITER(S): 9 INJECTION INTRAMUSCULAR; INTRAVENOUS; SUBCUTANEOUS at 23:07

## 2023-08-04 RX ADMIN — SODIUM CHLORIDE 3 MILLILITER(S): 9 INJECTION INTRAMUSCULAR; INTRAVENOUS; SUBCUTANEOUS at 12:19

## 2023-08-04 RX ADMIN — FENTANYL CITRATE 25 MICROGRAM(S): 50 INJECTION INTRAVENOUS at 11:05

## 2023-08-04 RX ADMIN — FENTANYL CITRATE 25 MICROGRAM(S): 50 INJECTION INTRAVENOUS at 13:31

## 2023-08-04 RX ADMIN — Medication 1000 MILLIGRAM(S): at 13:32

## 2023-08-04 NOTE — H&P CARDIOLOGY - NEUROLOGICAL DETAILS
16 cig/day; The patient remains on the prescribed tobacco cessation medication regimen of 1 mg Chantix BID, along with 150mg bupropion BID as directed, without any negative side effects at this time; pt reports still not liking the way food tastes and smells, also reports does not want cigarettes as much and does not smoke entire cigarettes; pt will begin reduction plan of decreasing 1-2 cig/day every 5-6 days alert and oriented x 3/responds to pain/responds to verbal commands/sensation intact/cranial nerves intact/normal strength

## 2023-08-04 NOTE — PROVIDER CONTACT NOTE (MEDICATION) - ASSESSMENT
Pt s/p LHC with KKIO to LAD. As per Dr. Pickett, discontinue Plavix and start Brilinta.   Brilinta sent to [facility] to check for coverage. Upon review, patient admits that she does not have prescription insurance. Therefore, we would like to request a one month free trial. [facility] staff will fill prescription for free one month. Dr. Pickett would like to maintain Brilinta for the one month and he will have patient switch back to Plavix as an outpatient. Discussed with and reviewed with patient.

## 2023-08-04 NOTE — PATIENT PROFILE ADULT - FALL HARM RISK - HARM RISK INTERVENTIONS

## 2023-08-04 NOTE — H&P CARDIOLOGY - NSWEIGHTCALCTOOLDRUG2_GEN_A_CORE
Pt received out of bed in the chair, + R IJ Cordis, +Tele monitoring, + NC O2 4 L/min, + cavazos, + 2 CT, + epicardial wires + SCD in NAD. Son is present. Pt requested son to translate.  used

## 2023-08-04 NOTE — H&P CARDIOLOGY - HISTORY OF PRESENT ILLNESS
57 y/o female with a PMHx of CAD s/p stent placement to LAD, HTN, and HLD presents for elective cardiac catheterization. Pt underwent angiogram with KIKO to LAD in December 2022 for chest pain and an abnormal nuclear stress test. Post procedure, pt felt much better with no complaints of chest pain. Since then, she has been stable up until last week. Pt went for a rigorous bike ride with her  last week and approximately 20 minutes into her ride, she developed intense substernal chest pain. Pt describes the chest pain as a severe, 10/10, non-pleuritic, non-radiating substernal chest pain which she compares to "someone sitting on my chest." Pt immediately stopped riding her bike to see if she would feel better however her pain remained constant for the rest of the day, although it eased slightly. Pt woke up the next morning and her chest pain was mild but still present. Pt followed up with her cardiologist, Dr. Pickett, this week, who recommended cardiac catheterization. Pt denies fever, chills, recent travel, headache, dizziness, visual deficits, shortness of breath, orthopnea, palpitations, abdominal pain, N/V/D/C, hematochezia, melena, dysuria, hematuria, LOC, syncope, peripheral edema.    Cardiologist: Dr. Pavan Pickett

## 2023-08-04 NOTE — H&P CARDIOLOGY - NSICDXFAMILYHX_GEN_ALL_CORE_FT
FAMILY HISTORY:  No pertinent family history in first degree relatives, adopted     Xellayoz Pregnancy And Lactation Text: This medication is Pregnancy Category D and is not considered safe during pregnancy.  The risk during breast feeding is also uncertain.

## 2023-08-05 ENCOUNTER — TRANSCRIPTION ENCOUNTER (OUTPATIENT)
Age: 58
End: 2023-08-05

## 2023-08-05 VITALS
HEART RATE: 60 BPM | RESPIRATION RATE: 16 BRPM | OXYGEN SATURATION: 99 % | DIASTOLIC BLOOD PRESSURE: 60 MMHG | SYSTOLIC BLOOD PRESSURE: 109 MMHG | TEMPERATURE: 98 F

## 2023-08-05 LAB
ANION GAP SERPL CALC-SCNC: 10 MMOL/L — SIGNIFICANT CHANGE UP (ref 7–14)
BASOPHILS # BLD AUTO: 0.03 K/UL — SIGNIFICANT CHANGE UP (ref 0–0.2)
BASOPHILS NFR BLD AUTO: 0.4 % — SIGNIFICANT CHANGE UP (ref 0–2)
BUN SERPL-MCNC: 18 MG/DL — SIGNIFICANT CHANGE UP (ref 7–23)
CALCIUM SERPL-MCNC: 9.4 MG/DL — SIGNIFICANT CHANGE UP (ref 8.4–10.5)
CHLORIDE SERPL-SCNC: 104 MMOL/L — SIGNIFICANT CHANGE UP (ref 98–107)
CO2 SERPL-SCNC: 25 MMOL/L — SIGNIFICANT CHANGE UP (ref 22–31)
CREAT SERPL-MCNC: 0.99 MG/DL — SIGNIFICANT CHANGE UP (ref 0.5–1.3)
EGFR: 67 ML/MIN/1.73M2 — SIGNIFICANT CHANGE UP
EOSINOPHIL # BLD AUTO: 0.12 K/UL — SIGNIFICANT CHANGE UP (ref 0–0.5)
EOSINOPHIL NFR BLD AUTO: 1.5 % — SIGNIFICANT CHANGE UP (ref 0–6)
GLUCOSE SERPL-MCNC: 91 MG/DL — SIGNIFICANT CHANGE UP (ref 70–99)
HCT VFR BLD CALC: 37.7 % — SIGNIFICANT CHANGE UP (ref 34.5–45)
HGB BLD-MCNC: 12.8 G/DL — SIGNIFICANT CHANGE UP (ref 11.5–15.5)
IANC: 5.26 K/UL — SIGNIFICANT CHANGE UP (ref 1.8–7.4)
IMM GRANULOCYTES NFR BLD AUTO: 0.6 % — SIGNIFICANT CHANGE UP (ref 0–0.9)
LYMPHOCYTES # BLD AUTO: 2.3 K/UL — SIGNIFICANT CHANGE UP (ref 1–3.3)
LYMPHOCYTES # BLD AUTO: 28.4 % — SIGNIFICANT CHANGE UP (ref 13–44)
MAGNESIUM SERPL-MCNC: 2.2 MG/DL — SIGNIFICANT CHANGE UP (ref 1.6–2.6)
MCHC RBC-ENTMCNC: 29.4 PG — SIGNIFICANT CHANGE UP (ref 27–34)
MCHC RBC-ENTMCNC: 34 GM/DL — SIGNIFICANT CHANGE UP (ref 32–36)
MCV RBC AUTO: 86.7 FL — SIGNIFICANT CHANGE UP (ref 80–100)
MONOCYTES # BLD AUTO: 0.35 K/UL — SIGNIFICANT CHANGE UP (ref 0–0.9)
MONOCYTES NFR BLD AUTO: 4.3 % — SIGNIFICANT CHANGE UP (ref 2–14)
NEUTROPHILS # BLD AUTO: 5.26 K/UL — SIGNIFICANT CHANGE UP (ref 1.8–7.4)
NEUTROPHILS NFR BLD AUTO: 64.8 % — SIGNIFICANT CHANGE UP (ref 43–77)
NRBC # BLD: 0 /100 WBCS — SIGNIFICANT CHANGE UP (ref 0–0)
NRBC # FLD: 0 K/UL — SIGNIFICANT CHANGE UP (ref 0–0)
PHOSPHATE SERPL-MCNC: 3.8 MG/DL — SIGNIFICANT CHANGE UP (ref 2.5–4.5)
PLATELET # BLD AUTO: 213 K/UL — SIGNIFICANT CHANGE UP (ref 150–400)
POTASSIUM SERPL-MCNC: 3.9 MMOL/L — SIGNIFICANT CHANGE UP (ref 3.5–5.3)
POTASSIUM SERPL-SCNC: 3.9 MMOL/L — SIGNIFICANT CHANGE UP (ref 3.5–5.3)
RBC # BLD: 4.35 M/UL — SIGNIFICANT CHANGE UP (ref 3.8–5.2)
RBC # FLD: 12.1 % — SIGNIFICANT CHANGE UP (ref 10.3–14.5)
SODIUM SERPL-SCNC: 139 MMOL/L — SIGNIFICANT CHANGE UP (ref 135–145)
WBC # BLD: 8.11 K/UL — SIGNIFICANT CHANGE UP (ref 3.8–10.5)
WBC # FLD AUTO: 8.11 K/UL — SIGNIFICANT CHANGE UP (ref 3.8–10.5)

## 2023-08-05 RX ORDER — UBIDECARENONE 100 MG
1 CAPSULE ORAL
Refills: 0 | DISCHARGE

## 2023-08-05 RX ORDER — TICAGRELOR 90 MG/1
1 TABLET ORAL
Qty: 60 | Refills: 0
Start: 2023-08-05 | End: 2023-09-03

## 2023-08-05 RX ADMIN — LISINOPRIL 40 MILLIGRAM(S): 2.5 TABLET ORAL at 07:11

## 2023-08-05 RX ADMIN — SODIUM CHLORIDE 3 MILLILITER(S): 9 INJECTION INTRAMUSCULAR; INTRAVENOUS; SUBCUTANEOUS at 06:38

## 2023-08-05 RX ADMIN — AMLODIPINE BESYLATE 10 MILLIGRAM(S): 2.5 TABLET ORAL at 07:12

## 2023-08-05 RX ADMIN — TICAGRELOR 90 MILLIGRAM(S): 90 TABLET ORAL at 08:15

## 2023-08-05 NOTE — DISCHARGE NOTE PROVIDER - CARE PROVIDER_API CALL
Pavan Pickett  Cardiovascular Disease  [address] [facility], [address]  [place] [address]  Phone: [phone]  Fax: [phone]  Established Patient  Follow Up Time:

## 2023-08-05 NOTE — DISCHARGE NOTE PROVIDER - NSDCCPCAREPLAN_GEN_ALL_CORE_FT
PRINCIPAL DISCHARGE DIAGNOSIS  Diagnosis: CAD (coronary artery disease)  Assessment and Plan of Treatment: Continue aspirin, brilinta ( new), metoprolol, lipitor  Follow up with Dr Pickett in one week      SECONDARY DISCHARGE DIAGNOSES  Diagnosis: Hypertension  Assessment and Plan of Treatment: Low salt, low fat, low cholesterol diet  Amlodipine and benazepril

## 2023-08-05 NOTE — DISCHARGE NOTE PROVIDER - NSDCFUADDAPPT_GEN_ALL_CORE_FT
Follow up with Dr Pickett in one week- see appointments    Also you will need to switch back to plavix (and will need to stop brilinta). Follow up with Dr Pickett about how and when to do that

## 2023-08-05 NOTE — DISCHARGE NOTE PROVIDER - NSDCMRMEDTOKEN_GEN_ALL_CORE_FT
amlodipine-benazepril 10 mg-40 mg oral capsule: 1 cap(s) orally once a day  Aspirin Enteric Coated 81 mg oral delayed release tablet: 1 tab(s) orally once a day  atorvastatin 40 mg oral tablet: 1 tab(s) orally once a day (at bedtime)  Brilinta (ticagrelor) 90 mg oral tablet: 1 tab(s) orally 2 times a day Take for one month. Then restart plavix after following up with Dr Pickett  Vitamin D3 25 mcg (1000 intl units) oral capsule: 1 cap(s) orally once a day

## 2023-08-05 NOTE — CHART NOTE - NSCHARTNOTEFT_GEN_A_CORE
Medicine Subsequent Hospital Care Note- ACP  Patient wihtout any complaints and wants to go home  ROS:  Denies fever, chills, diaphoresis , malaise, night sweat, generalized weakness, SOB cough, sputum production, wheezing, hemoptysis, CP, BLANCO, orthopnea, PND, palpitations, diaphoresis, lightheadedness, dizziness, syncope, edema. nausea, vomiting, diarrhea, constipation, abdominal pain, melena, hematochezia, dysphagia, dysuria, frequency, urgency, hematuria, nocturia.    -------------------------------------------------------------------------------------------------------------------------------------------------  Vital Signs:  Vital Signs Last 24 Hrs  T(C): 36.6 (23 @ 06:43), Max: 36.9 (23 @ 21:23)  T(F): 97.8 (23 @ 06:43), Max: 98.4 (23 @ 21:23)  HR: 60 (23 @ 06:43) (60 - 64)  BP: 109/60 (23 @ 06:43) (109/60 - 116/57)  RR: 16 (23 @ 06:43) (16 - 19)  SpO2: 99% (23 @ 06:43) (99% - 100%) on (O2)    Telemetry/Alarms:  General: WN/WD NAD  Neurology: Awake, nonfocal, ZHU x 4  Eyes: Scleras clear, PERRLA/ EOMI, Gross vision intact  ENT:Gross hearing intact, grossly patent pharynx, no stridor  Neck: Neck supple, trachea midline, No JVD,   Respiratory: CTA B/L, No wheezing, rales, rhonchi  CV: RRR, S1S2, no murmurs, rubs or gallops  Abdominal: Soft, NT, ND +BS,   Extremities: No edema, + peripheral pulses  Skin: No Rashes, Hematoma, Ecchymosis  Lymphatic: No Neck, axilla, groin LAD  Psych: Oriented x 3, normal affect  RRA site: NO hematoma, pain, swelling bruits- now soft, mild echcymossis  Relevant labs, radiology and Medications reviewed                        12.8   8.11  )-----------( 213      ( 05 Aug 2023 07:16 )             37.7     08-05    139  |  104  |  18  ----------------------------<  91  3.9   |  25  |  0.99    Ca    9.4      05 Aug 2023 07:16  Phos  3.8     08-05  Mg     2.20     08-05    TPro  7.6  /  Alb  4.7  /  TBili  0.6  /  DBili  x   /  AST  18  /  ALT  20  /  AlkPhos  94  08-04      MEDICATIONS  (STANDING):  amLODIPine   Tablet 10 milliGRAM(s) Oral daily  aspirin enteric coated 81 milliGRAM(s) Oral daily  atorvastatin 40 milliGRAM(s) Oral at bedtime  cholecalciferol 1000 Unit(s) Oral daily  lisinopril 40 milliGRAM(s) Oral daily  sodium chloride 0.9% Bolus 250 milliLiter(s) IV Bolus once  sodium chloride 0.9% lock flush 3 milliLiter(s) IV Push every 8 hours  sodium chloride 0.9%. 250 milliLiter(s) (75 mL/Hr) IV Continuous <Continuous>  sodium chloride 0.9%. 1000 milliLiter(s) (100 mL/Hr) IV Continuous <Continuous>  ticagrelor 90 milliGRAM(s) Oral every 12 hours    MEDICATIONS  (PRN):    I&O's Summary    I reviewed the above lab results, tests, telemetry, and  EKG interpretation. .  Assessment  58y Female  w/ PAST MEDICAL & SURGICAL HISTORY:  HLD (hyperlipidemia)  CAD (coronary artery disease)  HTN (hypertension)  S/P appendectomy  S/P   S/P coronary artery stent placement  admitted with complaints of Patient is a 58y old  Female who presents with a chief complaint of .s/p LHC: prox LAD 95% s/p KIKO x 1. Cx normal. mid RCA mild. RRA access.  Pt developed hematoma up the entire forearm post procedure s/p manual pressure x 30 minutes, elevation, cold compress.    PLAN  D/C to home as per Dr Pickett   Discussed with Dr Pickett. Patient stable for discharge home on brilinta and rest of home meds  Clinical findings, labs, tests, telemetry, and ekg reviewed with attending. Will monitor patient closely.   [ x ]Low complexity/risk ( Time> 15min)
Pt seen and evaluated post procedure and found to have a hematoma from right radial band up forearm to elbow. Pulses and sensation remain intact. Pt with moderate pain up the arm. Manual pressure applied by myself for 20 minutes with significant improvement followed by 10 minutes of manual pressure by covering RN. Fentanyl 25 mcg IVP given for pain. Hematoma improved. Pt instructed to keep arm elevated. Cold compress applied. Will continue to monitor. Dr. Pickett made aware.
Pt seen at bedside s/p cath via R wrist. Dressing site clean, dry intact. + Pt had hematoma compressed noted around Right forearm. Reports improvement in arm. Pulses intact. Capillary refill appropriate. Pt currently denies any c/o chest pain, palpitations, dyspnea, numbness or tingling. Pt declined pain medications. Discussed with pt to continue to rest and elevate arm and notify for any changes. Will continue to monitor.

## 2023-08-05 NOTE — DISCHARGE NOTE NURSING/CASE MANAGEMENT/SOCIAL WORK - PATIENT PORTAL LINK FT
You can access the FollowMyHealth Patient Portal offered by [facility] by registering at the following website: [email or web address]. By joining [employer]’s FollowMyHealth portal, you will also be able to view your health information using other applications (apps) compatible with our system.

## 2023-08-05 NOTE — DISCHARGE NOTE PROVIDER - NSDCFUADDINST_GEN_ALL_CORE_FT
No driving x 24 hours. No strenuous activity for three weeks. Monitor site of procedure and notify your doctor for any  bleeding / swelling/redness/ discharge/pain. You may shower but no baths or swimming x one week. No heavy lifting x 1 week.

## 2023-08-05 NOTE — DISCHARGE NOTE PROVIDER - NSDCFUSCHEDAPPT_GEN_ALL_CORE_FT
Pavan Pickett Physician [facility]  CARDIOLOGY [address] [facility]   Scheduled Appointment: 09/13/2023

## 2023-08-05 NOTE — DISCHARGE NOTE NURSING/CASE MANAGEMENT/SOCIAL WORK - NSDCPEFALRISK_GEN_ALL_CORE
For information on Fall & Injury Prevention, visit: [email or web address] OR  [email or web address] OR  https://www.cdc.gov/steadi/patient.html

## 2023-08-05 NOTE — DISCHARGE NOTE PROVIDER - HOSPITAL COURSE
59 y/o female with a PMHx of CAD s/p stent placement to LAD, HTN, and HLD presents for elective cardiac catheterization in the setting of exertional chest pain.    8/4 LHC: prox LAD 95% s/p KIKO x 1. Cx normal. mid RCA mild. RRA access.  Pt developed hematoma up the entire forearm post procedure s/p manual pressure x 30 minutes, elevation, cold compress.  NO hematoma, pain,swelling, bleeding discharge pulses 2+ Bilaterally  Patient stable for discharge home today as per attending. INES Pickett in one week

## 2023-08-07 PROBLEM — I10 ESSENTIAL (PRIMARY) HYPERTENSION: Chronic | Status: ACTIVE | Noted: 2023-08-04

## 2023-08-07 PROBLEM — I25.10 ATHEROSCLEROTIC HEART DISEASE OF NATIVE CORONARY ARTERY WITHOUT ANGINA PECTORIS: Chronic | Status: ACTIVE | Noted: 2023-08-04

## 2023-08-08 ENCOUNTER — APPOINTMENT (OUTPATIENT)
Dept: CARDIOLOGY | Facility: CLINIC | Age: 58
End: 2023-08-08
Payer: COMMERCIAL

## 2023-08-08 ENCOUNTER — NON-APPOINTMENT (OUTPATIENT)
Age: 58
End: 2023-08-08

## 2023-08-08 VITALS
DIASTOLIC BLOOD PRESSURE: 70 MMHG | SYSTOLIC BLOOD PRESSURE: 108 MMHG | HEART RATE: 76 BPM | WEIGHT: 128 LBS | OXYGEN SATURATION: 98 % | BODY MASS INDEX: 25 KG/M2

## 2023-08-08 PROCEDURE — 93000 ELECTROCARDIOGRAM COMPLETE: CPT

## 2023-08-08 PROCEDURE — 99214 OFFICE O/P EST MOD 30 MIN: CPT | Mod: 25

## 2023-08-08 NOTE — PHYSICAL EXAM
[Well Developed] : well developed [Well Nourished] : well nourished [No Acute Distress] : no acute distress [Normal Conjunctiva] : normal conjunctiva [No Carotid Bruit] : no carotid bruit [Normal S1, S2] : normal S1, S2 [No Murmur] : no murmur [Clear Lung Fields] : clear lung fields [Soft] : abdomen soft [Non Tender] : non-tender [No Edema] : no edema [de-identified] : No acute distress [de-identified] : Right radial somewhat tender pulses 2+ in the right radial ecchymoses with mild swelling much improved

## 2023-08-08 NOTE — HISTORY OF PRESENT ILLNESS
[FreeTextEntry1] : Ansley Lan   Is an overall healthy 57-year-old with a history of hypertension and hyperlipidemia.  She has been in good health without any medical issues and without any significant hospitalizations   she presented to her internist Dr. Nancy Ritchie with recurrent episodes of awakening with severe chest pain and new EKG changes with ST-T wave changes in the precordial leads suggestive of ischemia.    She was admitted to [facility].  Her enzymes were normal.  Patient underwent a nuclear stress test and had ST segment depression during exercise chest pain and had evidence of LAD ischemia   cardiac catheterization on November 18, 2022 revealed severe subtotal occlusion of the mid LAD.  Rest of the vessels were unremarkable.  She underwent successful stenting of the mid LAD with a drug-eluting stent.  She was discharged 24 hours later  .  Since then she has been asymptomatic without chest pain chest pressure shortness of breath.  She has had no episodes of either exertional or awakening at rest chest pain that she had prior to the PTCI   she was done from the right radial and had some pain in her right radial did not go back to work because of the pain but this is now resolved .   Her present medications include  atorvastatin, Plavix 75 aspirin 81 amlodipine benazepril 10-40 co-Q10   EKG dated December 6, 2022 normal sinus rhythm minor nonspecific ST-T wave changes.  There are no T inversions now in the precordial leads that were there at the time of her admission to [facility]  Visit March 8, 2023: Patient is several months status post PTCI the LAD.  She feels extremely well without chest pain chest pressure shortness of breath.  She has no exertional dizziness no exertional chest pain.  She exercises regularly without difficulty.  Apparently her blood work is in excellent order with Dr. Ritchie   EKG March 8, 2023 normal sinus rhythm minimal nonspecific ST-T wave changes probably within normal limits  Visit August 1, 2023:: The patient has been doing quite well.  She went for a vigorous bicycle ride with her  and felt quite fatigued and short of breath and had chest tightness similar to what she had prior to a PTCI the LAD.  Since then she has had some recurrent episodes in the morning of chest tightness.  She did go like bicycle riding recently without any discomfort.  The symptoms are quite reminiscent of what she had when she presented in November 2022 when she had a stent placed to the LAD  EKG August 1, 2023 normal sinus rhythm some nonspecific ST-T wave changes unchanged from prior EKG  Visit August 8 2023: The patient underwent cardiac catheterization on August 3, 2023 at [facility] from the right radial approach.  She had restenosis of the proximal to mid LAD with some new disease more proximally.  She underwent successful restenting with a long 38 mm stent.  She had some mild chest pain after the procedure but stable EKG and developed a right radial hematoma which resolved and improved with compression.  She was left in the hospital overnight to observe the right radial and was discharged the next day on August 4, 2023  Since then she has felt well and has had no chest pain chest pressure at rest or with any type of exertion.  She is on Brilinta 90 mg twice a day at least for 1 month and then will go back to Plavix

## 2023-08-08 NOTE — DISCUSSION/SUMMARY
[FreeTextEntry1] : 1.  Continue present regimen of medication including the Brilinta 90 twice daily aspirin 81 and statin 2.  After 1 month change back to Plavix she should take 4 tablets of the Plavix the day after she stops of Brilinta and then Plavix 75 3.  Follow-up in September.  She has a scheduled appointment [EKG obtained to assist in diagnosis and management of assessed problem(s)] : EKG obtained to assist in diagnosis and management of assessed problem(s)

## 2023-08-08 NOTE — ASSESSMENT
[FreeTextEntry1] : Dilshad Lan  several weeks status post PTCI the LAD for unstable angina.  She is presently asymptomatic.  Physical exam is normal and EKG with some very minor nonspecific ST-T wave changes. EKG is today is probably normal with some very minor nonspecific EKG changes.  The patient recently developed recurrent chest tightness with vigorous bicycle riding and has had 1-2 episodes of chest tightness at rest reminiscent of the symptoms she had prior to the PTCI.  She had recurrent disease in the proximal mid LAD which underwent successful stenting on August 3, 2023 and is now asymptomatic with a stable EKG unchanged   1.  Ischemic heart disease status post PTCI of the mid LAD for unstable angina.  Status post repeat PTCI of the proximal mid LAD for restenosis.  Now asymptomatic   2.  History of hypertension well-controlled on amlodipine benazepril 10-40  3.  Mixed hyperlipoidemia on statin LDL should be brought down to below 55  4.  Right wrist hematoma now resolving with slight pain and ecchymoses  The patient clinically is doing extremely well and is had no recurrence of angina

## 2023-08-08 NOTE — REASON FOR VISIT
[FreeTextEntry1] : Ansley BALDERAS 58 years old recently presented with recurrent unstable angina and had restenosis and some new disease in the proximal LAD which underwent successful stenting.  She now returns for follow-up.  She was switched to Brilinta at least for 1 month in addition to aspirin 81

## 2023-08-16 NOTE — ED ADULT TRIAGE NOTE - ACCOMPANIED BY
EMT/paramedic
No respiratory distress. No stridor, Lungs sounds clear with good aeration bilaterally.

## 2023-09-13 ENCOUNTER — APPOINTMENT (OUTPATIENT)
Dept: CARDIOLOGY | Facility: CLINIC | Age: 58
End: 2023-09-13
Payer: COMMERCIAL

## 2023-09-13 VITALS
OXYGEN SATURATION: 98 % | DIASTOLIC BLOOD PRESSURE: 70 MMHG | BODY MASS INDEX: 25 KG/M2 | HEART RATE: 63 BPM | SYSTOLIC BLOOD PRESSURE: 120 MMHG | WEIGHT: 128 LBS

## 2023-09-13 PROCEDURE — 99214 OFFICE O/P EST MOD 30 MIN: CPT

## 2023-12-13 ENCOUNTER — NON-APPOINTMENT (OUTPATIENT)
Age: 58
End: 2023-12-13

## 2023-12-13 ENCOUNTER — APPOINTMENT (OUTPATIENT)
Dept: CARDIOLOGY | Facility: CLINIC | Age: 58
End: 2023-12-13
Payer: COMMERCIAL

## 2023-12-13 VITALS
OXYGEN SATURATION: 98 % | WEIGHT: 127 LBS | BODY MASS INDEX: 24.94 KG/M2 | DIASTOLIC BLOOD PRESSURE: 78 MMHG | HEIGHT: 60 IN | SYSTOLIC BLOOD PRESSURE: 110 MMHG | HEART RATE: 59 BPM

## 2023-12-13 DIAGNOSIS — R94.31 ABNORMAL ELECTROCARDIOGRAM [ECG] [EKG]: ICD-10-CM

## 2023-12-13 PROCEDURE — 93000 ELECTROCARDIOGRAM COMPLETE: CPT

## 2023-12-13 PROCEDURE — 99214 OFFICE O/P EST MOD 30 MIN: CPT | Mod: 25

## 2023-12-13 NOTE — HISTORY OF PRESENT ILLNESS
[FreeTextEntry1] : Ansley Lan   Is an overall healthy 58-year-old with a history of hypertension and hyperlipidemia.  She has been in good health without any medical issues and without any significant hospitalizations   she presented to her internist Dr. Nancy Ritchie with recurrent episodes of awakening with severe chest pain and new EKG changes with ST-T wave changes in the precordial leads suggestive of ischemia.    She was admitted to [facility].  Her enzymes were normal.  Patient underwent a nuclear stress test and had ST segment depression during exercise chest pain and had evidence of LAD ischemia   cardiac catheterization on November 18, 2022 revealed severe subtotal occlusion of the mid LAD.  Rest of the vessels were unremarkable.  She underwent successful stenting of the mid LAD with a drug-eluting stent.  She was discharged 24 hours later  .  Since then she has been asymptomatic without chest pain chest pressure shortness of breath.  She has had no episodes of either exertional or awakening at rest chest pain that she had prior to the PTCI   she was done from the right radial and had some pain in her right radial did not go back to work because of the pain but this is now resolved .   Her present medications include  atorvastatin, Plavix 75 aspirin 81 amlodipine benazepril 10-40 co-Q10   EKG dated December 6, 2022 normal sinus rhythm minor nonspecific ST-T wave changes.  There are no T inversions now in the precordial leads that were there at the time of her admission to [facility]  Visit March 8, 2023: Patient is several months status post PTCI the LAD.  She feels extremely well without chest pain chest pressure shortness of breath.  She has no exertional dizziness no exertional chest pain.  She exercises regularly without difficulty.  Apparently her blood work is in excellent order with Dr. Ritchie   EKG March 8, 2023 normal sinus rhythm minimal nonspecific ST-T wave changes probably within normal limits  Visit August 1, 2023:: The patient has been doing quite well.  She went for a vigorous bicycle ride with her  and felt quite fatigued and short of breath and had chest tightness similar to what she had prior to a PTCI the LAD.  Since then she has had some recurrent episodes in the morning of chest tightness.  She did go like bicycle riding recently without any discomfort.  The symptoms are quite reminiscent of what she had when she presented in November 2022 when she had a stent placed to the LAD  EKG August 1, 2023 normal sinus rhythm some nonspecific ST-T wave changes unchanged from prior EKG  Visit August 8 2023: The patient underwent cardiac catheterization on August 3, 2023 at [facility] from the right radial approach.  She had restenosis of the proximal to mid LAD with some new disease more proximally.  She underwent successful restenting with a long 38 mm stent.  She had some mild chest pain after the procedure but stable EKG and developed a right radial hematoma which resolved and improved with compression.  She was left in the hospital overnight to observe the right radial and was discharged the next day on August 4, 2023  Since then she has felt well and has had no chest pain chest pressure at rest or with any type of exertion.  She is on Brilinta 90 mg twice a day at least for 1 month and then will go back to Plavix  Visit September 13, 2023: The patient is now over 1-1/2 months status post PTCI of restenosis and further disease in the proximal LAD.  She is riding her bike without difficulty and now wants to participate in hot yoga.  She has good exercise tolerance no chest pain chest pressure or palpitations.  She has no shortness of breath.  Medications now include aspirin 81 Plavix 75 Lipitor 40 CoQ 10 vitamin D and an antihypertensive that she does not recall but will call me with what it actually is  The patient apparently has abnormalities on eye findings and neuro-ophthalmology has recommended an MRI.  There appears to be some possible bleeding in the retina.  There is no contraindication to an MRI  Visit December 13, 2023: The patient relates that she had been doing quite well walking with the dog without difficulty doing all kinds of activities without chest pain chest pressure shortness of breath.  Because of the cold weather she did not go back to bicycling but decided to go back swimming.  She would swim quite vigorously and had no symptoms but recently after about 10 laps she has severe chest pain and some shortness of breath.  It is slightly reminiscent of the original pain.  She stops the pain is relieved.  She has tested herself a few times and after 10 labs she again has this severe chest pain  EKG December 13, 2023 normal sinus rhythm within normal limits there are no T wave abnormalities

## 2023-12-13 NOTE — REASON FOR VISIT
[FreeTextEntry1] : Ansley Lan 58 years old here for follow-up of her cardiac status.  She is status post fairly recent recurrent PTCI the LAD and started swimming and after 10 laps has developed some severe chest pain

## 2023-12-13 NOTE — DISCUSSION/SUMMARY
[FreeTextEntry1] : 1.  I am not certain as to the etiology of his chest pain but it is of concern.  She is hemodynamically stable 2.  I suggested that she have an exercise nuclear stress test to see exactly what happens during vigorous exercise to her EKG blood pressure and to see if there is any evidence of ischemia  The patient will schedule the test and follow-up with me soon thereafter.  If her pain should get worse or car at rest then we should be more aggressive and she might need another angiogram but I have a feeling that this is not a restenosis or new lesion  Follow-up with me after the nuclear stress test [EKG obtained to assist in diagnosis and management of assessed problem(s)] : EKG obtained to assist in diagnosis and management of assessed problem(s)

## 2023-12-13 NOTE — ASSESSMENT
[FreeTextEntry1] : The patient is status post 2 PTCI's the last in August 2023 with a new stenosis in the proximal LAD after PTCI of the mid LAD.  She has been asymptomatic until recently when she started swimming vigorously and has developed a chest pain syndrome severe after 10 laps.  It does not occur with other activity.  Her EKG and physical exam and blood pressure are all within normal limits.  The etiology of this pain is unclear but it is of concern considering that she has had at least some progression of disease in the LAD and 2 PTCI's.  Her blood pressure is well-controlled and EKG is unremarkable

## 2023-12-13 NOTE — PHYSICAL EXAM
[Well Developed] : well developed [Well Nourished] : well nourished [No Acute Distress] : no acute distress [Normal Conjunctiva] : normal conjunctiva [No Carotid Bruit] : no carotid bruit [Normal S1, S2] : normal S1, S2 [No Murmur] : no murmur [Clear Lung Fields] : clear lung fields [Soft] : abdomen soft [Non Tender] : non-tender [No Edema] : no edema [de-identified] : No acute distress looks well though anxious about this recent severe chest pain while swimming [de-identified] : Right radial somewhat tender pulses 2+ in the right radial ecchymoses with mild swelling much improved

## 2023-12-21 ENCOUNTER — APPOINTMENT (OUTPATIENT)
Dept: CARDIOLOGY | Facility: CLINIC | Age: 58
End: 2023-12-21

## 2023-12-21 ENCOUNTER — OUTPATIENT (OUTPATIENT)
Dept: OUTPATIENT SERVICES | Facility: HOSPITAL | Age: 58
LOS: 1 days | End: 2023-12-21
Payer: COMMERCIAL

## 2023-12-21 ENCOUNTER — APPOINTMENT (OUTPATIENT)
Dept: CV DIAGNOSTICS | Facility: HOSPITAL | Age: 58
End: 2023-12-21

## 2023-12-21 DIAGNOSIS — R94.39 ABNORMAL RESULT OF OTHER CARDIOVASCULAR FUNCTION STUDY: ICD-10-CM

## 2023-12-21 DIAGNOSIS — Z90.49 ACQUIRED ABSENCE OF OTHER SPECIFIED PARTS OF DIGESTIVE TRACT: Chronic | ICD-10-CM

## 2023-12-21 DIAGNOSIS — Z98.891 HISTORY OF UTERINE SCAR FROM PREVIOUS SURGERY: Chronic | ICD-10-CM

## 2023-12-21 DIAGNOSIS — E78.2 MIXED HYPERLIPIDEMIA: ICD-10-CM

## 2023-12-21 DIAGNOSIS — Z95.5 PRESENCE OF CORONARY ANGIOPLASTY IMPLANT AND GRAFT: Chronic | ICD-10-CM

## 2023-12-21 PROCEDURE — A9500: CPT

## 2023-12-21 PROCEDURE — 93017 CV STRESS TEST TRACING ONLY: CPT

## 2023-12-21 PROCEDURE — 93018 CV STRESS TEST I&R ONLY: CPT | Mod: MH

## 2023-12-21 PROCEDURE — 78452 HT MUSCLE IMAGE SPECT MULT: CPT | Mod: 26,MH

## 2023-12-21 PROCEDURE — 78452 HT MUSCLE IMAGE SPECT MULT: CPT | Mod: MH

## 2023-12-21 PROCEDURE — 93016 CV STRESS TEST SUPVJ ONLY: CPT | Mod: MH

## 2023-12-22 ENCOUNTER — APPOINTMENT (OUTPATIENT)
Dept: CARDIOLOGY | Facility: CLINIC | Age: 58
End: 2023-12-22
Payer: COMMERCIAL

## 2023-12-22 VITALS
OXYGEN SATURATION: 98 % | SYSTOLIC BLOOD PRESSURE: 111 MMHG | HEART RATE: 64 BPM | BODY MASS INDEX: 25.32 KG/M2 | HEIGHT: 60 IN | WEIGHT: 129 LBS | DIASTOLIC BLOOD PRESSURE: 65 MMHG

## 2023-12-22 DIAGNOSIS — R07.89 OTHER CHEST PAIN: ICD-10-CM

## 2023-12-22 PROBLEM — R94.39 ABNORMAL NUCLEAR STRESS TEST: Status: ACTIVE | Noted: 2023-12-22

## 2023-12-22 PROCEDURE — 99214 OFFICE O/P EST MOD 30 MIN: CPT

## 2023-12-22 NOTE — REASON FOR VISIT
[FreeTextEntry1] : Ansley Lan 58 years old status post recent repeat PTCI of the proximal LAD in August 2023 and now with increasing recurrent exertional chest pressure.  She had a nuclear stress test on December 21, 2023 which showed an area of septal and apical moncho-infarct ischemia. Home

## 2023-12-22 NOTE — DISCUSSION/SUMMARY
[FreeTextEntry1] : The patient appears to have recurrent symptoms suggestive of ischemia and the ischemia seems to be in the territory of the LAD.  She has had 2 PTCI of the LAD with excellent results the last being in August.  This could represent spasm or some progression of disease or restenosis.  Unfortunately the only way we will know is to perform a repeat angiogram.  I have recommended that she undergo the angiogram as early as possible before her symptoms progress.  I will try to scheduled for early next week December 26, 2023 at [facility].  I reviewed the angiograms with the patient and her  and they are agreeable to proceeding with a repeat angiogram.  Her symptoms are only ischemic but we have scheduled her for early next week on December 26

## 2023-12-22 NOTE — HISTORY OF PRESENT ILLNESS
[FreeTextEntry1] : Ansley Lan   Is an overall healthy 58-year-old with a history of hypertension and hyperlipidemia.  She has been in good health without any medical issues and without any significant hospitalizations   she presented to her internist Dr. Nancy Ritchie with recurrent episodes of awakening with severe chest pain and new EKG changes with ST-T wave changes in the precordial leads suggestive of ischemia.    She was admitted to [facility].  Her enzymes were normal.  Patient underwent a nuclear stress test and had ST segment depression during exercise chest pain and had evidence of LAD ischemia   cardiac catheterization on November 18, 2022 revealed severe subtotal occlusion of the mid LAD.  Rest of the vessels were unremarkable.  She underwent successful stenting of the mid LAD with a drug-eluting stent.  She was discharged 24 hours later  .  Since then she has been asymptomatic without chest pain chest pressure shortness of breath.  She has had no episodes of either exertional or awakening at rest chest pain that she had prior to the PTCI   she was done from the right radial and had some pain in her right radial did not go back to work because of the pain but this is now resolved .   Her present medications include  atorvastatin, Plavix 75 aspirin 81 amlodipine benazepril 10-40 co-Q10   EKG dated December 6, 2022 normal sinus rhythm minor nonspecific ST-T wave changes.  There are no T inversions now in the precordial leads that were there at the time of her admission to [facility]  Visit March 8, 2023: Patient is several months status post PTCI the LAD.  She feels extremely well without chest pain chest pressure shortness of breath.  She has no exertional dizziness no exertional chest pain.  She exercises regularly without difficulty.  Apparently her blood work is in excellent order with Dr. Ritchie   EKG March 8, 2023 normal sinus rhythm minimal nonspecific ST-T wave changes probably within normal limits  Visit August 1, 2023:: The patient has been doing quite well.  She went for a vigorous bicycle ride with her  and felt quite fatigued and short of breath and had chest tightness similar to what she had prior to a PTCI the LAD.  Since then she has had some recurrent episodes in the morning of chest tightness.  She did go like bicycle riding recently without any discomfort.  The symptoms are quite reminiscent of what she had when she presented in November 2022 when she had a stent placed to the LAD  EKG August 1, 2023 normal sinus rhythm some nonspecific ST-T wave changes unchanged from prior EKG  Visit August 8 2023: The patient underwent cardiac catheterization on August 3, 2023 at [facility] from the right radial approach.  She had restenosis of the proximal to mid LAD with some new disease more proximally.  She underwent successful restenting with a long 38 mm stent.  She had some mild chest pain after the procedure but stable EKG and developed a right radial hematoma which resolved and improved with compression.  She was left in the hospital overnight to observe the right radial and was discharged the next day on August 4, 2023  Since then she has felt well and has had no chest pain chest pressure at rest or with any type of exertion.  She is on Brilinta 90 mg twice a day at least for 1 month and then will go back to Plavix  Visit September 13, 2023: The patient is now over 1-1/2 months status post PTCI of restenosis and further disease in the proximal LAD.  She is riding her bike without difficulty and now wants to participate in hot yoga.  She has good exercise tolerance no chest pain chest pressure or palpitations.  She has no shortness of breath.  Medications now include aspirin 81 Plavix 75 Lipitor 40 CoQ 10 vitamin D and an antihypertensive that she does not recall but will call me with what it actually is  The patient apparently has abnormalities on eye findings and neuro-ophthalmology has recommended an MRI.  There appears to be some possible bleeding in the retina.  There is no contraindication to an MRI  Visit December 13, 2023: The patient relates that she had been doing quite well walking with the dog without difficulty doing all kinds of activities without chest pain chest pressure shortness of breath.  Because of the cold weather she did not go back to bicycling but decided to go back swimming.  She would swim quite vigorously and had no symptoms but recently after about 10 laps she has severe chest pain and some shortness of breath.  It is slightly reminiscent of the original pain.  She stops the pain is relieved.  She has tested herself a few times and after 10 labs she again has this severe chest pain  EKG December 13, 2023 normal sinus rhythm within normal limits there are no T wave abnormalities  Visit December 22, 2023: The patient was scheduled for a nuclear stress test.  She did relate prior to the stress test that she now has been having this exertional chest pressure not only with swimming but when she is walking at a fast pace.  She underwent a nuclear stress test December 21, 2023 which revealed an area of moncho-infarct apical and septal ischemia suggestive of LAD disease.  This is Approximately approximately 4-1/2 months status post her most recent PTCI.  She has no rest pain and it is only with significant exertion but this has been her pattern and then it begins to increase even further

## 2023-12-22 NOTE — ASSESSMENT
[FreeTextEntry1] : The patient is status post 2 PTCI's the last in August 2023 with a new stenosis in the proximal LAD after PTCI of the mid LAD. She has been asymptomatic until recently when she started swimming vigorously and has developed a chest pain syndrome severe after 10 laps. It does not occur with other activity. Her EKG and physical exam and blood pressure are all within normal limits. The etiology of this pain is unclear but it is of concern considering that she has had at least some progression of disease in the LAD and 2 PTCI's. Her blood pressure is well-controlled and EKG is unremarkable.  She now has further discomfort with walking fast not only with swimming but not at rest.  She has an abnormal nuclear stress test  I reviewed her prior angiograms with her and her .  I see no way to be certain as to why she is having recurrent pain without appropriate angiogram.  It is somewhat unusual that first that she has coronary disease #2 that she keeps having recurrent symptoms after successful PTCI

## 2023-12-22 NOTE — PHYSICAL EXAM
[de-identified] : No acute distress looks well though anxious about this recent severe chest pain while swimming [de-identified] : Right radial somewhat tender pulses 2+ in the right radial ecchymoses with mild swelling much improved

## 2023-12-27 ENCOUNTER — OUTPATIENT (OUTPATIENT)
Dept: OUTPATIENT SERVICES | Facility: HOSPITAL | Age: 58
LOS: 1 days | Discharge: ANOTHER HOSPITAL | End: 2023-12-27
Payer: COMMERCIAL

## 2023-12-27 ENCOUNTER — INPATIENT (INPATIENT)
Facility: HOSPITAL | Age: 58
LOS: 6 days | Discharge: HOME HEALTH | DRG: 236 | End: 2024-01-03
Attending: STUDENT IN AN ORGANIZED HEALTH CARE EDUCATION/TRAINING PROGRAM | Admitting: STUDENT IN AN ORGANIZED HEALTH CARE EDUCATION/TRAINING PROGRAM
Payer: COMMERCIAL

## 2023-12-27 VITALS
OXYGEN SATURATION: 97 % | HEIGHT: 60 IN | SYSTOLIC BLOOD PRESSURE: 111 MMHG | TEMPERATURE: 98 F | WEIGHT: 130.73 LBS | RESPIRATION RATE: 18 BRPM | DIASTOLIC BLOOD PRESSURE: 73 MMHG | HEART RATE: 60 BPM

## 2023-12-27 DIAGNOSIS — E78.5 HYPERLIPIDEMIA, UNSPECIFIED: ICD-10-CM

## 2023-12-27 DIAGNOSIS — Z98.891 HISTORY OF UTERINE SCAR FROM PREVIOUS SURGERY: Chronic | ICD-10-CM

## 2023-12-27 DIAGNOSIS — Z95.5 PRESENCE OF CORONARY ANGIOPLASTY IMPLANT AND GRAFT: Chronic | ICD-10-CM

## 2023-12-27 DIAGNOSIS — R94.39 ABNORMAL RESULT OF OTHER CARDIOVASCULAR FUNCTION STUDY: ICD-10-CM

## 2023-12-27 DIAGNOSIS — Z90.49 ACQUIRED ABSENCE OF OTHER SPECIFIED PARTS OF DIGESTIVE TRACT: Chronic | ICD-10-CM

## 2023-12-27 DIAGNOSIS — I25.10 ATHEROSCLEROTIC HEART DISEASE OF NATIVE CORONARY ARTERY WITHOUT ANGINA PECTORIS: ICD-10-CM

## 2023-12-27 DIAGNOSIS — I10 ESSENTIAL (PRIMARY) HYPERTENSION: ICD-10-CM

## 2023-12-27 LAB
ALBUMIN SERPL ELPH-MCNC: 4 G/DL — SIGNIFICANT CHANGE UP (ref 3.3–5)
ALBUMIN SERPL ELPH-MCNC: 4 G/DL — SIGNIFICANT CHANGE UP (ref 3.3–5)
ALP SERPL-CCNC: 79 U/L — SIGNIFICANT CHANGE UP (ref 40–120)
ALP SERPL-CCNC: 79 U/L — SIGNIFICANT CHANGE UP (ref 40–120)
ALT FLD-CCNC: 18 U/L — SIGNIFICANT CHANGE UP (ref 10–45)
ALT FLD-CCNC: 18 U/L — SIGNIFICANT CHANGE UP (ref 10–45)
ANION GAP SERPL CALC-SCNC: 11 MMOL/L — SIGNIFICANT CHANGE UP (ref 7–14)
ANION GAP SERPL CALC-SCNC: 11 MMOL/L — SIGNIFICANT CHANGE UP (ref 7–14)
ANION GAP SERPL CALC-SCNC: 8 MMOL/L — SIGNIFICANT CHANGE UP (ref 5–17)
ANION GAP SERPL CALC-SCNC: 8 MMOL/L — SIGNIFICANT CHANGE UP (ref 5–17)
APPEARANCE UR: CLEAR — SIGNIFICANT CHANGE UP
APPEARANCE UR: CLEAR — SIGNIFICANT CHANGE UP
APTT BLD: 154.5 SEC — CRITICAL HIGH (ref 24.5–35.6)
APTT BLD: 154.5 SEC — CRITICAL HIGH (ref 24.5–35.6)
APTT BLD: 31.1 SEC — SIGNIFICANT CHANGE UP (ref 24.5–35.6)
APTT BLD: 31.1 SEC — SIGNIFICANT CHANGE UP (ref 24.5–35.6)
AST SERPL-CCNC: 15 U/L — SIGNIFICANT CHANGE UP (ref 10–40)
AST SERPL-CCNC: 15 U/L — SIGNIFICANT CHANGE UP (ref 10–40)
BACTERIA # UR AUTO: NEGATIVE /HPF — SIGNIFICANT CHANGE UP
BACTERIA # UR AUTO: NEGATIVE /HPF — SIGNIFICANT CHANGE UP
BILIRUB SERPL-MCNC: 0.5 MG/DL — SIGNIFICANT CHANGE UP (ref 0.2–1.2)
BILIRUB SERPL-MCNC: 0.5 MG/DL — SIGNIFICANT CHANGE UP (ref 0.2–1.2)
BILIRUB UR-MCNC: NEGATIVE — SIGNIFICANT CHANGE UP
BILIRUB UR-MCNC: NEGATIVE — SIGNIFICANT CHANGE UP
BLD GP AB SCN SERPL QL: NEGATIVE — SIGNIFICANT CHANGE UP
BLD GP AB SCN SERPL QL: NEGATIVE — SIGNIFICANT CHANGE UP
BUN SERPL-MCNC: 15 MG/DL — SIGNIFICANT CHANGE UP (ref 7–23)
BUN SERPL-MCNC: 15 MG/DL — SIGNIFICANT CHANGE UP (ref 7–23)
BUN SERPL-MCNC: 17 MG/DL — SIGNIFICANT CHANGE UP (ref 7–23)
BUN SERPL-MCNC: 17 MG/DL — SIGNIFICANT CHANGE UP (ref 7–23)
CALCIUM SERPL-MCNC: 8.9 MG/DL — SIGNIFICANT CHANGE UP (ref 8.4–10.5)
CALCIUM SERPL-MCNC: 8.9 MG/DL — SIGNIFICANT CHANGE UP (ref 8.4–10.5)
CALCIUM SERPL-MCNC: 9.1 MG/DL — SIGNIFICANT CHANGE UP (ref 8.4–10.5)
CALCIUM SERPL-MCNC: 9.1 MG/DL — SIGNIFICANT CHANGE UP (ref 8.4–10.5)
CAST: 0 /LPF — SIGNIFICANT CHANGE UP (ref 0–4)
CAST: 0 /LPF — SIGNIFICANT CHANGE UP (ref 0–4)
CHLORIDE SERPL-SCNC: 107 MMOL/L — SIGNIFICANT CHANGE UP (ref 98–107)
CHLORIDE SERPL-SCNC: 107 MMOL/L — SIGNIFICANT CHANGE UP (ref 98–107)
CHLORIDE SERPL-SCNC: 108 MMOL/L — SIGNIFICANT CHANGE UP (ref 96–108)
CHLORIDE SERPL-SCNC: 108 MMOL/L — SIGNIFICANT CHANGE UP (ref 96–108)
CO2 SERPL-SCNC: 25 MMOL/L — SIGNIFICANT CHANGE UP (ref 22–31)
CO2 SERPL-SCNC: 25 MMOL/L — SIGNIFICANT CHANGE UP (ref 22–31)
CO2 SERPL-SCNC: 26 MMOL/L — SIGNIFICANT CHANGE UP (ref 22–31)
CO2 SERPL-SCNC: 26 MMOL/L — SIGNIFICANT CHANGE UP (ref 22–31)
COLOR SPEC: YELLOW — SIGNIFICANT CHANGE UP
COLOR SPEC: YELLOW — SIGNIFICANT CHANGE UP
CREAT SERPL-MCNC: 0.87 MG/DL — SIGNIFICANT CHANGE UP (ref 0.5–1.3)
CREAT SERPL-MCNC: 0.87 MG/DL — SIGNIFICANT CHANGE UP (ref 0.5–1.3)
CREAT SERPL-MCNC: 0.94 MG/DL — SIGNIFICANT CHANGE UP (ref 0.5–1.3)
CREAT SERPL-MCNC: 0.94 MG/DL — SIGNIFICANT CHANGE UP (ref 0.5–1.3)
DIFF PNL FLD: NEGATIVE — SIGNIFICANT CHANGE UP
DIFF PNL FLD: NEGATIVE — SIGNIFICANT CHANGE UP
EGFR: 71 ML/MIN/1.73M2 — SIGNIFICANT CHANGE UP
EGFR: 71 ML/MIN/1.73M2 — SIGNIFICANT CHANGE UP
EGFR: 78 ML/MIN/1.73M2 — SIGNIFICANT CHANGE UP
EGFR: 78 ML/MIN/1.73M2 — SIGNIFICANT CHANGE UP
GLUCOSE SERPL-MCNC: 108 MG/DL — HIGH (ref 70–99)
GLUCOSE SERPL-MCNC: 108 MG/DL — HIGH (ref 70–99)
GLUCOSE SERPL-MCNC: 95 MG/DL — SIGNIFICANT CHANGE UP (ref 70–99)
GLUCOSE SERPL-MCNC: 95 MG/DL — SIGNIFICANT CHANGE UP (ref 70–99)
GLUCOSE UR QL: NEGATIVE MG/DL — SIGNIFICANT CHANGE UP
GLUCOSE UR QL: NEGATIVE MG/DL — SIGNIFICANT CHANGE UP
HCT VFR BLD CALC: 34 % — LOW (ref 34.5–45)
HCT VFR BLD CALC: 34 % — LOW (ref 34.5–45)
HCT VFR BLD CALC: 35.7 % — SIGNIFICANT CHANGE UP (ref 34.5–45)
HCT VFR BLD CALC: 35.7 % — SIGNIFICANT CHANGE UP (ref 34.5–45)
HGB BLD-MCNC: 11.6 G/DL — SIGNIFICANT CHANGE UP (ref 11.5–15.5)
HGB BLD-MCNC: 11.6 G/DL — SIGNIFICANT CHANGE UP (ref 11.5–15.5)
HGB BLD-MCNC: 12 G/DL — SIGNIFICANT CHANGE UP (ref 11.5–15.5)
HGB BLD-MCNC: 12 G/DL — SIGNIFICANT CHANGE UP (ref 11.5–15.5)
INR BLD: 0.98 RATIO — SIGNIFICANT CHANGE UP (ref 0.85–1.18)
INR BLD: 0.98 RATIO — SIGNIFICANT CHANGE UP (ref 0.85–1.18)
INR BLD: 1.01 RATIO — SIGNIFICANT CHANGE UP (ref 0.85–1.18)
INR BLD: 1.01 RATIO — SIGNIFICANT CHANGE UP (ref 0.85–1.18)
KETONES UR-MCNC: NEGATIVE MG/DL — SIGNIFICANT CHANGE UP
KETONES UR-MCNC: NEGATIVE MG/DL — SIGNIFICANT CHANGE UP
LEUKOCYTE ESTERASE UR-ACNC: ABNORMAL
LEUKOCYTE ESTERASE UR-ACNC: ABNORMAL
MCHC RBC-ENTMCNC: 28.7 PG — SIGNIFICANT CHANGE UP (ref 27–34)
MCHC RBC-ENTMCNC: 28.7 PG — SIGNIFICANT CHANGE UP (ref 27–34)
MCHC RBC-ENTMCNC: 29.1 PG — SIGNIFICANT CHANGE UP (ref 27–34)
MCHC RBC-ENTMCNC: 29.1 PG — SIGNIFICANT CHANGE UP (ref 27–34)
MCHC RBC-ENTMCNC: 33.6 GM/DL — SIGNIFICANT CHANGE UP (ref 32–36)
MCHC RBC-ENTMCNC: 33.6 GM/DL — SIGNIFICANT CHANGE UP (ref 32–36)
MCHC RBC-ENTMCNC: 34.1 GM/DL — SIGNIFICANT CHANGE UP (ref 32–36)
MCHC RBC-ENTMCNC: 34.1 GM/DL — SIGNIFICANT CHANGE UP (ref 32–36)
MCV RBC AUTO: 85.4 FL — SIGNIFICANT CHANGE UP (ref 80–100)
NITRITE UR-MCNC: NEGATIVE — SIGNIFICANT CHANGE UP
NITRITE UR-MCNC: NEGATIVE — SIGNIFICANT CHANGE UP
NRBC # BLD: 0 /100 WBCS — SIGNIFICANT CHANGE UP (ref 0–0)
NRBC # FLD: 0 K/UL — SIGNIFICANT CHANGE UP (ref 0–0)
NRBC # FLD: 0 K/UL — SIGNIFICANT CHANGE UP (ref 0–0)
PA ADP PRP-ACNC: 157 PRU — LOW (ref 194–417)
PA ADP PRP-ACNC: 157 PRU — LOW (ref 194–417)
PH UR: 7 — SIGNIFICANT CHANGE UP (ref 5–8)
PH UR: 7 — SIGNIFICANT CHANGE UP (ref 5–8)
PLATELET # BLD AUTO: 184 K/UL — SIGNIFICANT CHANGE UP (ref 150–400)
PLATELET # BLD AUTO: 184 K/UL — SIGNIFICANT CHANGE UP (ref 150–400)
PLATELET # BLD AUTO: 207 K/UL — SIGNIFICANT CHANGE UP (ref 150–400)
PLATELET # BLD AUTO: 207 K/UL — SIGNIFICANT CHANGE UP (ref 150–400)
POTASSIUM SERPL-MCNC: 3.9 MMOL/L — SIGNIFICANT CHANGE UP (ref 3.5–5.3)
POTASSIUM SERPL-MCNC: 3.9 MMOL/L — SIGNIFICANT CHANGE UP (ref 3.5–5.3)
POTASSIUM SERPL-MCNC: 4.1 MMOL/L — SIGNIFICANT CHANGE UP (ref 3.5–5.3)
POTASSIUM SERPL-MCNC: 4.1 MMOL/L — SIGNIFICANT CHANGE UP (ref 3.5–5.3)
POTASSIUM SERPL-SCNC: 3.9 MMOL/L — SIGNIFICANT CHANGE UP (ref 3.5–5.3)
POTASSIUM SERPL-SCNC: 3.9 MMOL/L — SIGNIFICANT CHANGE UP (ref 3.5–5.3)
POTASSIUM SERPL-SCNC: 4.1 MMOL/L — SIGNIFICANT CHANGE UP (ref 3.5–5.3)
POTASSIUM SERPL-SCNC: 4.1 MMOL/L — SIGNIFICANT CHANGE UP (ref 3.5–5.3)
PROT SERPL-MCNC: 6.2 G/DL — SIGNIFICANT CHANGE UP (ref 6–8.3)
PROT SERPL-MCNC: 6.2 G/DL — SIGNIFICANT CHANGE UP (ref 6–8.3)
PROT UR-MCNC: NEGATIVE MG/DL — SIGNIFICANT CHANGE UP
PROT UR-MCNC: NEGATIVE MG/DL — SIGNIFICANT CHANGE UP
PROTHROM AB SERPL-ACNC: 11.1 SEC — SIGNIFICANT CHANGE UP (ref 9.5–13)
RBC # BLD: 3.98 M/UL — SIGNIFICANT CHANGE UP (ref 3.8–5.2)
RBC # BLD: 3.98 M/UL — SIGNIFICANT CHANGE UP (ref 3.8–5.2)
RBC # BLD: 4.18 M/UL — SIGNIFICANT CHANGE UP (ref 3.8–5.2)
RBC # BLD: 4.18 M/UL — SIGNIFICANT CHANGE UP (ref 3.8–5.2)
RBC # FLD: 12.2 % — SIGNIFICANT CHANGE UP (ref 10.3–14.5)
RBC # FLD: 12.2 % — SIGNIFICANT CHANGE UP (ref 10.3–14.5)
RBC # FLD: 12.3 % — SIGNIFICANT CHANGE UP (ref 10.3–14.5)
RBC # FLD: 12.3 % — SIGNIFICANT CHANGE UP (ref 10.3–14.5)
RBC CASTS # UR COMP ASSIST: 0 /HPF — SIGNIFICANT CHANGE UP (ref 0–4)
RBC CASTS # UR COMP ASSIST: 0 /HPF — SIGNIFICANT CHANGE UP (ref 0–4)
RH IG SCN BLD-IMP: POSITIVE — SIGNIFICANT CHANGE UP
RH IG SCN BLD-IMP: POSITIVE — SIGNIFICANT CHANGE UP
SODIUM SERPL-SCNC: 142 MMOL/L — SIGNIFICANT CHANGE UP (ref 135–145)
SODIUM SERPL-SCNC: 142 MMOL/L — SIGNIFICANT CHANGE UP (ref 135–145)
SODIUM SERPL-SCNC: 143 MMOL/L — SIGNIFICANT CHANGE UP (ref 135–145)
SODIUM SERPL-SCNC: 143 MMOL/L — SIGNIFICANT CHANGE UP (ref 135–145)
SP GR SPEC: 1.01 — SIGNIFICANT CHANGE UP (ref 1–1.03)
SP GR SPEC: 1.01 — SIGNIFICANT CHANGE UP (ref 1–1.03)
SQUAMOUS # UR AUTO: 0 /HPF — SIGNIFICANT CHANGE UP (ref 0–5)
SQUAMOUS # UR AUTO: 0 /HPF — SIGNIFICANT CHANGE UP (ref 0–5)
UFH PPP CHRO-ACNC: <0.04 IU/ML — LOW (ref 0.3–0.7)
UFH PPP CHRO-ACNC: <0.04 IU/ML — LOW (ref 0.3–0.7)
UROBILINOGEN FLD QL: 0.2 MG/DL — SIGNIFICANT CHANGE UP (ref 0.2–1)
UROBILINOGEN FLD QL: 0.2 MG/DL — SIGNIFICANT CHANGE UP (ref 0.2–1)
WBC # BLD: 5.82 K/UL — SIGNIFICANT CHANGE UP (ref 3.8–10.5)
WBC # BLD: 5.82 K/UL — SIGNIFICANT CHANGE UP (ref 3.8–10.5)
WBC # BLD: 6.47 K/UL — SIGNIFICANT CHANGE UP (ref 3.8–10.5)
WBC # BLD: 6.47 K/UL — SIGNIFICANT CHANGE UP (ref 3.8–10.5)
WBC # FLD AUTO: 5.82 K/UL — SIGNIFICANT CHANGE UP (ref 3.8–10.5)
WBC # FLD AUTO: 5.82 K/UL — SIGNIFICANT CHANGE UP (ref 3.8–10.5)
WBC # FLD AUTO: 6.47 K/UL — SIGNIFICANT CHANGE UP (ref 3.8–10.5)
WBC # FLD AUTO: 6.47 K/UL — SIGNIFICANT CHANGE UP (ref 3.8–10.5)
WBC UR QL: 1 /HPF — SIGNIFICANT CHANGE UP (ref 0–5)
WBC UR QL: 1 /HPF — SIGNIFICANT CHANGE UP (ref 0–5)

## 2023-12-27 PROCEDURE — 92978 ENDOLUMINL IVUS OCT C 1ST: CPT | Mod: 26,LM

## 2023-12-27 PROCEDURE — 93306 TTE W/DOPPLER COMPLETE: CPT | Mod: 26

## 2023-12-27 PROCEDURE — 93458 L HRT ARTERY/VENTRICLE ANGIO: CPT | Mod: 26

## 2023-12-27 PROCEDURE — 93010 ELECTROCARDIOGRAM REPORT: CPT

## 2023-12-27 PROCEDURE — 99152 MOD SED SAME PHYS/QHP 5/>YRS: CPT

## 2023-12-27 PROCEDURE — 71045 X-RAY EXAM CHEST 1 VIEW: CPT | Mod: 26

## 2023-12-27 PROCEDURE — 93880 EXTRACRANIAL BILAT STUDY: CPT | Mod: 26

## 2023-12-27 PROCEDURE — 93010 ELECTROCARDIOGRAM REPORT: CPT | Mod: 59

## 2023-12-27 PROCEDURE — 99222 1ST HOSP IP/OBS MODERATE 55: CPT

## 2023-12-27 RX ORDER — INFLUENZA VIRUS VACCINE 15; 15; 15; 15 UG/.5ML; UG/.5ML; UG/.5ML; UG/.5ML
0.5 SUSPENSION INTRAMUSCULAR ONCE
Refills: 0 | Status: DISCONTINUED | OUTPATIENT
Start: 2023-12-27 | End: 2023-12-27

## 2023-12-27 RX ORDER — ASPIRIN/CALCIUM CARB/MAGNESIUM 324 MG
1 TABLET ORAL
Qty: 0 | Refills: 0 | DISCHARGE

## 2023-12-27 RX ORDER — ASPIRIN/CALCIUM CARB/MAGNESIUM 324 MG
81 TABLET ORAL DAILY
Refills: 0 | Status: DISCONTINUED | OUTPATIENT
Start: 2023-12-27 | End: 2023-12-29

## 2023-12-27 RX ORDER — SODIUM CHLORIDE 9 MG/ML
3 INJECTION INTRAMUSCULAR; INTRAVENOUS; SUBCUTANEOUS EVERY 8 HOURS
Refills: 0 | Status: DISCONTINUED | OUTPATIENT
Start: 2023-12-27 | End: 2024-01-10

## 2023-12-27 RX ORDER — HEPARIN SODIUM 5000 [USP'U]/ML
800 INJECTION INTRAVENOUS; SUBCUTANEOUS
Qty: 25000 | Refills: 0 | Status: DISCONTINUED | OUTPATIENT
Start: 2023-12-27 | End: 2023-12-29

## 2023-12-27 RX ORDER — ACETAMINOPHEN 500 MG
650 TABLET ORAL ONCE
Refills: 0 | Status: COMPLETED | OUTPATIENT
Start: 2023-12-27 | End: 2023-12-27

## 2023-12-27 RX ORDER — METOPROLOL TARTRATE 50 MG
12.5 TABLET ORAL
Refills: 0 | Status: DISCONTINUED | OUTPATIENT
Start: 2023-12-27 | End: 2023-12-29

## 2023-12-27 RX ORDER — UBIDECARENONE 100 MG
1 CAPSULE ORAL
Refills: 0 | DISCHARGE

## 2023-12-27 RX ORDER — ACETAMINOPHEN 500 MG
650 TABLET ORAL EVERY 6 HOURS
Refills: 0 | Status: DISCONTINUED | OUTPATIENT
Start: 2023-12-27 | End: 2023-12-29

## 2023-12-27 RX ORDER — CHOLECALCIFEROL (VITAMIN D3) 125 MCG
1 CAPSULE ORAL
Refills: 0 | DISCHARGE

## 2023-12-27 RX ORDER — CLOPIDOGREL BISULFATE 75 MG/1
1 TABLET, FILM COATED ORAL
Refills: 0 | DISCHARGE

## 2023-12-27 RX ORDER — ATORVASTATIN CALCIUM 80 MG/1
40 TABLET, FILM COATED ORAL AT BEDTIME
Refills: 0 | Status: DISCONTINUED | OUTPATIENT
Start: 2023-12-27 | End: 2023-12-29

## 2023-12-27 RX ORDER — LANOLIN ALCOHOL/MO/W.PET/CERES
3 CREAM (GRAM) TOPICAL AT BEDTIME
Refills: 0 | Status: DISCONTINUED | OUTPATIENT
Start: 2023-12-27 | End: 2023-12-29

## 2023-12-27 RX ADMIN — ATORVASTATIN CALCIUM 40 MILLIGRAM(S): 80 TABLET, FILM COATED ORAL at 21:53

## 2023-12-27 RX ADMIN — Medication 12.5 MILLIGRAM(S): at 17:34

## 2023-12-27 RX ADMIN — SODIUM CHLORIDE 3 MILLILITER(S): 9 INJECTION INTRAMUSCULAR; INTRAVENOUS; SUBCUTANEOUS at 14:34

## 2023-12-27 RX ADMIN — Medication 650 MILLIGRAM(S): at 10:33

## 2023-12-27 RX ADMIN — HEPARIN SODIUM 8 UNIT(S)/HR: 5000 INJECTION INTRAVENOUS; SUBCUTANEOUS at 17:34

## 2023-12-27 NOTE — H&P ADULT - HISTORY OF PRESENT ILLNESS
58 Fw/ pmh of htn, hld, cardiac stents-2, second hand smoke exposure presents today for elective cardiac catheterization at [facility] with . The patient c/o midsternal chest pain, aggravate with exertion (swimming and fast walking), 7/10, doesn't radiate, resolve with rest.  She denies SOB, palpitations, presyncope, syncope,  headache, visual disturbances, CVA, PE, DVT, MARIA INES, abdominal pain, N/V/D/C, hematochezia, melena, dysuria, hematuria, fever, chills. The patient was evaluated by a cardiologist, was found to have abnormal stress test.  Due to patient's symptoms and abnormal non invasive findings, the patient  was recommended to have cardiac catheterization. Stress test 12/23/23 There is a large-sized, severe defect in the anteropical and inferoapical walls consistent with infarct with severe moncho-infarct ischemia. There is a large sized, moderate to severe defect in the distal anteroseptal wall consistent with infarct with moderate to severe moncho-infarct ischemia; pt now s/p cath at [facility] which revealed + CAD; pt transferred to [facility] for OHS - CABG eval with Dr. Valdez. Pt denies cp/ sob at this time.         58 Fw/ pmh of htn, hld, cardiac stents-2, second hand smoke exposure presents today for elective cardiac catheterization at [facility] with . The patient c/o midsternal chest pain, aggravate with exertion (swimming and fast walking), 7/10, doesn't radiate, resolve with rest.  She denies SOB, palpitations, presyncope, syncope,  headache, visual disturbances, CVA, PE, DVT, MARIA INES, abdominal pain, N/V/D/C, hematochezia, melena, dysuria, hematuria, fever, chills. The patient was evaluated by a cardiologist, was found to have abnormal stress test.  Due to patient's symptoms and abnormal non invasive findings, the patient  was recommended to have cardiac catheterization. Stress test 12/23/23 There is a large-sized, severe defect in the anteropical and inferoapical walls consistent with infarct with severe moncho-infarct ischemia. There is a large sized, moderate to severe defect in the distal anteroseptal wall consistent with infarct with moderate to severe moncho-infarct ischemia; pt now s/p cath at [facility] which revealed + CAD; pt transferred to [facility] for OHS - CABG eval with Dr. Valdez. Pt denies cp/ sob at this time.

## 2023-12-27 NOTE — CONSULT NOTE ADULT - ASSESSMENT
Ansley aLn  presents with recurrent progressive exertional angina status post 2 PTCI of the proximal LAD now with progression of disease in the left main documented by IVUS as well as disease in the proximal LAD proximal to the stent and in the distal portion of the stent.      She has normal left ventricular function and presently is hemodynamically stable.      At this point as she has had recurrent stenting with recurrent stenosis and progression of disease, I have recommended that she undergo coronary bypass surgery.  She was transferred from [facility] to [place] for bypass surgery later this week     with continue present regimen of medication, hold Plavix, continue aspirin and start IV heparin     I discussed all the above in detail with the patient and with her  and they are agreeable to proceeding with bypass surgery Ansley Lan  presents with recurrent progressive exertional angina status post 2 PTCI of the proximal LAD now with progression of disease in the left main documented by IVUS as well as disease in the proximal LAD proximal to the stent and in the distal portion of the stent.      She has normal left ventricular function and presently is hemodynamically stable.      At this point as she has had recurrent stenting with recurrent stenosis and progression of disease, I have recommended that she undergo coronary bypass surgery.  She was transferred from [facility] to [place] for bypass surgery later this week     with continue present regimen of medication, hold Plavix, continue aspirin and start IV heparin     I discussed all the above in detail with the patient and with her  and they are agreeable to proceeding with bypass surgery

## 2023-12-27 NOTE — H&P ADULT - ASSESSMENT
58 Fw/ pmh of htn, hld, cardiac stents-2, second hand smoke exposure presents today for elective cardiac catheterization at [facility] with . The patient c/o midsternal chest pain, aggravate with exertion (swimming and fast walking), 7/10, doesn't radiate, resolve with rest.  She denies SOB, palpitations, presyncope, syncope,  headache, visual disturbances, CVA, PE, DVT, MARIA INES, abdominal pain, N/V/D/C, hematochezia, melena, dysuria, hematuria, fever, chills. The patient was evaluated by a cardiologist, was found to have abnormal stress test.  Due to patient's symptoms and abnormal non invasive findings, the patient  was recommended to have cardiac catheterization. Stress test 12/23/23 There is a large-sized, severe defect in the anteropical and inferoapical walls consistent with infarct with severe moncho-infarct ischemia. There is a large sized, moderate to severe defect in the distal anteroseptal wall consistent with infarct with moderate to severe moncho-infarct ischemia; pt now s/p cath at [facility] which revealed + CAD; pt transferred to [facility] for OHS - CABG eval with Dr. Valdez. Pt denies cp/ sob at this time.           58 Fw/ pmh of htn, hld, cardiac stents-2, second hand smoke exposure presents today for elective cardiac catheterization at [facility] with . The patient c/o midsternal chest pain, aggravate with exertion (swimming and fast walking), 7/10, doesn't radiate, resolve with rest.  She denies SOB, palpitations, presyncope, syncope,  headache, visual disturbances, CVA, PE, DVT, MARIA INES, abdominal pain, N/V/D/C, hematochezia, melena, dysuria, hematuria, fever, chills. The patient was evaluated by a cardiologist, was found to have abnormal stress test.  Due to patient's symptoms and abnormal non invasive findings, the patient  was recommended to have cardiac catheterization. Stress test 12/23/23 There is a large-sized, severe defect in the anteropical and inferoapical walls consistent with infarct with severe moncho-infarct ischemia. There is a large sized, moderate to severe defect in the distal anteroseptal wall consistent with infarct with moderate to severe moncho-infarct ischemia; pt now s/p cath at [facility] which revealed + CAD; pt transferred to [facility] for OHS - CABG eval with Dr. Valdez. Pt denies cp/ sob at this time.

## 2023-12-27 NOTE — H&P ADULT - NSHPREVIEWOFSYSTEMS_GEN_ALL_CORE
REVIEW OF SYSTEMS:  CONSTITUTIONAL: No fever, weight loss, or fatigue  EYES: No eye pain, visual disturbances, or discharge  ENMT:  No difficulty hearing, tinnitus, vertigo; No sinus or throat pain  NECK: No pain or stiffness  RESPIRATORY: Has non productive cough,No wheezing, chills or hemoptysis; No shortness of breath  CARDIOVASCULAR: CP w/ exertion; No palpitations, dizziness, or leg swelling  GASTROINTESTINAL: No abdominal or epigastric pain. No nausea, vomiting, or hematemesis; No diarrhea ,has No melena  GENITOURINARY: No dysuria, frequency, hematuria, or incontinence  NEUROLOGICAL: No headaches, memory loss, loss of strength, numbness, or tremors  SKIN: No itching, burning, rashes, or lesions; rt radial cath site cdi w/ dressing- neg hematoma/bleeding   LYMPH NODES: No enlarged glands  ENDOCRINE: No heat or cold intolerance; No hair loss  MUSCULOSKELETAL: No joint pain or swelling; No muscle, back, or extremity pain  PSYCHIATRIC: No depression, anxiety, mood swings, or difficulty sleeping  HEME/LYMPH: No easy bruising, or bleeding gums  ALLERGY: No hives or eczema

## 2023-12-27 NOTE — CONSULT NOTE ADULT - CONSULT REASON
transferred from [facility] for urgent CABG transferred from [facility] for urgent CABG

## 2023-12-27 NOTE — H&P CARDIOLOGY - HISTORY OF PRESENT ILLNESS
58 y.o. .female presents today for elective cardiac catheterization.  58 y.o. .female presents today for elective cardiac catheterization. The patient c/o midsternal chest pain, aggravate with exertion (swimming and fast walking), 7/10, doesn't radiate, resolve with rest.  She denies SOB, palpitations, presyncope, syncope,  headache, visual disturbances, CVA, PE, DVT, MARIA INES, abdominal pain, N/V/D/C, hematochezia, melena, dysuria, hematuria, fever, chills. The patient was evaluated by a cardiologist, was found to have abnormal stress test.  Due to patient's symptoms and abnormal non invasive findings, the patient  was recommended to have cardiac catheterization. The patient denies any complaints at present.     Stress test 12/23/23 There is a large-sized, severe defect in the anteropical and inferoapical walls consistent with infarct with severe moncho-infarct ischemia. There is a large sized, moderate to severe defect in the distal anteroseptal wall consistent with infarct with moderate to severe moncho-infarct ischemia.

## 2023-12-27 NOTE — H&P ADULT - NSHPSOCIALHISTORY_GEN_ALL_CORE
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

## 2023-12-27 NOTE — H&P ADULT - NSHPPHYSICALEXAM_GEN_ALL_CORE
General: NAD  HEENT:  NC/AT  Neuro: A&Ox4, gait steady, speech clear, no focal deficits noted  Respiratory: B/L BS CTA, no wheeze, no rhonchi, no crackles noted  Cardiovascular: RSR 60-70;  normal S1S2, no murmur noted  GI: Abd soft, NT/ND, +BSx4Q +BM  Peripheral Vascular:  B/L LE neg edema, 2+ peripheral pulses, no clubbing, cyanosis, varicosities/PVD noted  Musculoskeletal: B/L UE and LE 5/5 strength   Psychiatric: Normal mood, normal affect observed  Skin: Normal exam to inspection and palpation. no bleeding, no hematoma; rt radial cath site cdi w/ dressing- neg hematoma/bleeding

## 2023-12-27 NOTE — PATIENT PROFILE ADULT - FALL HARM RISK - HARM RISK INTERVENTIONS
Communicate Risk of Fall with Harm to all staff/Reinforce activity limits and safety measures with patient and family/Tailored Fall Risk Interventions/Visual Cue: Yellow wristband and red socks/Bed in lowest position, wheels locked, appropriate side rails in place/Call bell, personal items and telephone in reach/Instruct patient to call for assistance before getting out of bed or chair/Non-slip footwear when patient is out of bed/[place] to call system/Physically safe environment - no spills, clutter or unnecessary equipment/Purposeful Proactive Rounding/Room/bathroom lighting operational, light cord in reach Communicate Risk of Fall with Harm to all staff/Reinforce activity limits and safety measures with patient and family/Tailored Fall Risk Interventions/Visual Cue: Yellow wristband and red socks/Bed in lowest position, wheels locked, appropriate side rails in place/Call bell, personal items and telephone in reach/Instruct patient to call for assistance before getting out of bed or chair/Non-slip footwear when patient is out of bed/[place] to call system/Physically safe environment - no spills, clutter or unnecessary equipment/Purposeful Proactive Rounding/Room/bathroom lighting operational, light cord in reach

## 2023-12-27 NOTE — CONSULT NOTE ADULT - SUBJECTIVE AND OBJECTIVE BOX
Patient is well-known to me.  She underwent cardiac catheterization today at [facility] and is being transferred for coronary bypass surgery by Dr. Edmond Valdez     in brief the patient presented approximately a year and a half ago with unstable angina and had severe disease of the proximal LAD which underwent successful stenting.  She returned in August 2023 with recurrent exertional chest pain and had partial restenosis of the LAD stent with some disease more proximally.  She was fine until the last few weeks when she developed exertional pain while swimming and then exertional discomfort while walking briskly.  A nuclear stress test suggested LAD disease     she has risk factors of hypertension and hyperlipidemia all of which have been well-controlled   she underwent repeat catheterization at [facility] today which revealed severe disease in the left main which had progressed and disease in the proximal LAD with a patent proximal stent but disease in the distal portion of the stent with a subtotal occlusion.  This was documented by IVUS which did reveal significant plaque soft in nature in the left main     2D echo performed today was unremarkable with normal LV function no valvular disease.  LVEDP in the Cath Lab was normal  MEDICATIONS:  MEDICATIONS  (STANDING):      PHYSICAL EXAM:  T(C): --  HR: --  BP: --  RR: --  SpO2: --  Wt(kg): --  I&O's Summary    Height (cm): 152.4 (12-27 @ 11:16)  Weight (kg): 57.606 (12-27 @ 11:16)  BMI (kg/m2): 24.8 (12-27 @ 11:16)  BSA (m2): 1.54 (12-27 @ 11:16)    Appearance: Normal	 looks well in good shape no acute distress in reasonable spirits  HEENT:   Normal oral mucosa, PERRL, EOMI	  Cardiovascular: Normal S1 S2, No JVD, No murmurs ,  Respiratory: Lungs clear to auscultation, normal effort 	  Gastrointestinal:  Soft, Non-tender, + BS	  Skin: No rashes, No ecchymoses, No cyanosis, warm to touch  Musculoskeletal: Normal range of motion, normal strength  Psychiatry:  Mood & affect appropriate  Ext: No edema  Peripheral pulses palpable 2+ bilaterally      LABS:    CARDIAC MARKERS:                                12.0   5.82  )-----------( 207      ( 27 Dec 2023 06:00 )             35.7     12-27    143  |  107  |  15  ----------------------------<  95  3.9   |  25  |  0.87    Ca    8.9      27 Dec 2023 06:00      proBNP:   Lipid Profile:   HgA1c:   TSH:           TELEMETRY: 	    ECG:  normal sinus rhythm within normal limit 	  RADIOLOGY:   < from: TTE W or WO Ultrasound Enhancing Agent (12.27.23 @ 12:02) >  CONCLUSIONS:      1. Left ventricular cavity is normal. Left ventricular wall thickness is normal. Left ventricular systolic function is normal with an ejection fraction of 68 % by Fajardo's method of disks. There are no regional wall motion abnormalities seen.   2. Normal left ventricular diastolic function.   3. Normal right ventricular cavity size and systolic function.   4. Structurally normal mitral valve with normal leaflet excursion. There is trace mitral regurgitation.    ________________________________________________________________________________________  FINDINGS:     Left Ventricle:  The left ventricular cavity is normal. Left ventricular wall thickness is normal. Left ventricular systolic function is normal with a calculated ejection fraction of 68% by the Fajardo's biplane method of disks. There are no regional wall motion abnormalities seen. There is normal left ventricular diastolic function.     Right Ventricle:  The right ventricular cavity is normal in size and normal systolic function. Tricuspid annular plane systolic excursion (TAPSE) is 2.5 cm (normal >=1.7 cm).     Left Atrium:  The left atrium is normal with an indexed volume of 18.71 ml/m².     Right Atrium:  The right atrium is normal in size.     Aortic Valve:  Aortic valve leaflet morphology not well visualized. There is no evidence of aortic regurgitation.     Mitral Valve:  Structurally normal mitral valve with normal leaflet excursion. There is trace mitral regurgitation.     Tricuspid Valve:  Structurally normal tricuspid valve with normal leaflet excursion. There is trace tricuspid regurgitation.     Pulmonic Valve:  Structurally normal pulmonic valve with normal leaflet excursion. There is trace pulmonic regurgitation.     Aorta:  The aortic root at the sinusesof Valsalva is normal in size, measuring 2.80 cm (indexed 1.82 cm/m²).     Pericardium:  No pericardial effusion seen.     Systemic Veins:  The inferior vena cava was not well visualized.  ____________________________________________________________________  QUANTITATIVE DATA:  Left Ventricle Measurements: (Indexed to BSA)     IVSd (2D):   0.7 cm  LVPWd (2D):  0.7 cm  LVIDd (2D):  4.0 cm  LVIDs (2D):  2.4 cm  LV Mass:     84 g   54.7 g/m²  LV Vol d, MOD A2C: 63.3 ml 41.13 ml/m²  LV Vol d, MOD A4C: 76.3 ml 49.58 ml/m²  LV Vol d, MOD BP:  69.3 ml 45.02 ml/m²  LV Vol s, MOD A2C: 23.8 ml 15.46 ml/m²  LV Vol s, MOD A4C: 18.8 ml 12.22 ml/m²  LV Vol s, MOD BP:  22.1 ml 14.36 ml/m²  LVOT SV MOD BP:    47.2 ml  LV EF% MOD BP:     68 %     MV E Vmax: 0.81 m/s  MV A Vmax:    0.98 m/s  MV E/A:       0.82  e' lateral:   11.10 cm/s  e' medial:    8.38 cm/s  E/e' lateral: 7.27  E/e' medial:  9.63  E/e' Average: 8.29  MV DT:        227 msec    Aorta Measurements: (normal range) (Indexed to BSA)     Sinuses of Valsalva: 2.80 cm (2.7 - 3.3 cm)  Ao Asc prox:         2.30 cm       Left Atrium Measurements: (Indexed to BSA)  LA Diam 2D:        3.20 cm  LA Vol s, MOD A4C: 25.90 ml.  LA Vol s, MOD A2C: 32.30 ml.  LA Vol s, MOD BP:  28.80 ml  18.71 ml/m²    Right Ventricle Measurements:     TAPSE:            2.5 cm  TV Marta. S':       11.60 cm/s  RV Base (RVID1):  2.5 cm  RV Mid (RVID2):   2.2 cm  RV Major (RVID3): 5.7 cm       LVOT / RVOT/ Qp/Qs Data: (Indexed to BSA)  LVOT Diameter: 1.90 cm  LVOT Vmax:     1.02 m/s  LVOT VTI:      25.00 cm  LVOT SV:       70.9 ml  46.06 ml/m²    Aortic Valve Measurements:  AV Vmax:                1.3 m/s  AV Peak Gradient:       6.6 mmHg  AV Mean Gradient:       4.0 mmHg  AV VTI:                 29.5 cm  AV VTI Ratio:           0.85  AoV EOA, Contin:        2.40 cm²  AoV EOA, Contin i:      1.56 cm²/m²  AoV Dimensionless Index 0.85    Mitral Valve Measurements:     MV E Vmax: 0.8 m/s  MV A Vmax: 1.0 m/s  MV E/A:    0.8    ________________________________________________________________________________________  Electronically signed on 12/27/2023 at 1:34:39 PM by Oren Ovalle M.D.                Patient is well-known to me.  She underwent cardiac catheterization today at [facility] and is being transferred for coronary bypass surgery by Dr. Edmond Valdez     in brief the patient presented approximately a year and a half ago with unstable angina and had severe disease of the proximal LAD which underwent successful stenting.  She returned in August 2023 with recurrent exertional chest pain and had partial restenosis of the LAD stent with some disease more proximally.  She was fine until the last few weeks when she developed exertional pain while swimming and then exertional discomfort while walking briskly.  A nuclear stress test suggested LAD disease     she has risk factors of hypertension and hyperlipidemia all of which have been well-controlled   she underwent repeat catheterization at [facility] today which revealed severe disease in the left main which had progressed and disease in the proximal LAD with a patent proximal stent but disease in the distal portion of the stent with a subtotal occlusion.  This was documented by IVUS which did reveal significant plaque soft in nature in the left main     2D echo performed today was unremarkable with normal LV function no valvular disease.  LVEDP in the Cath Lab was normal  MEDICATIONS:  MEDICATIONS  (STANDING):      PHYSICAL EXAM:  T(C): --  HR: --  BP: --  RR: --  SpO2: --  Wt(kg): --  I&O's Summary    Height (cm): 152.4 (12-27 @ 11:16)  Weight (kg): 57.606 (12-27 @ 11:16)  BMI (kg/m2): 24.8 (12-27 @ 11:16)  BSA (m2): 1.54 (12-27 @ 11:16)    Appearance: Normal	 looks well in good shape no acute distress in reasonable spirits  HEENT:   Normal oral mucosa, PERRL, EOMI	  Cardiovascular: Normal S1 S2, No JVD, No murmurs ,  Respiratory: Lungs clear to auscultation, normal effort 	  Gastrointestinal:  Soft, Non-tender, + BS	  Skin: No rashes, No ecchymoses, No cyanosis, warm to touch  Musculoskeletal: Normal range of motion, normal strength  Psychiatry:  Mood & affect appropriate  Ext: No edema  Peripheral pulses palpable 2+ bilaterally      LABS:    CARDIAC MARKERS:                                12.0   5.82  )-----------( 207      ( 27 Dec 2023 06:00 )             35.7     12-27    143  |  107  |  15  ----------------------------<  95  3.9   |  25  |  0.87    Ca    8.9      27 Dec 2023 06:00      proBNP:   Lipid Profile:   HgA1c:   TSH:           TELEMETRY: 	    ECG:  normal sinus rhythm within normal limit 	  RADIOLOGY:   < from: TTE W or WO Ultrasound Enhancing Agent (12.27.23 @ 12:02) >  CONCLUSIONS:      1. Left ventricular cavity is normal. Left ventricular wall thickness is normal. Left ventricular systolic function is normal with an ejection fraction of 68 % by Fajardo's method of disks. There are no regional wall motion abnormalities seen.   2. Normal left ventricular diastolic function.   3. Normal right ventricular cavity size and systolic function.   4. Structurally normal mitral valve with normal leaflet excursion. There is trace mitral regurgitation.    ________________________________________________________________________________________  FINDINGS:     Left Ventricle:  The left ventricular cavity is normal. Left ventricular wall thickness is normal. Left ventricular systolic function is normal with a calculated ejection fraction of 68% by the Fajardo's biplane method of disks. There are no regional wall motion abnormalities seen. There is normal left ventricular diastolic function.     Right Ventricle:  The right ventricular cavity is normal in size and normal systolic function. Tricuspid annular plane systolic excursion (TAPSE) is 2.5 cm (normal >=1.7 cm).     Left Atrium:  The left atrium is normal with an indexed volume of 18.71 ml/m².     Right Atrium:  The right atrium is normal in size.     Aortic Valve:  Aortic valve leaflet morphology not well visualized. There is no evidence of aortic regurgitation.     Mitral Valve:  Structurally normal mitral valve with normal leaflet excursion. There is trace mitral regurgitation.     Tricuspid Valve:  Structurally normal tricuspid valve with normal leaflet excursion. There is trace tricuspid regurgitation.     Pulmonic Valve:  Structurally normal pulmonic valve with normal leaflet excursion. There is trace pulmonic regurgitation.     Aorta:  The aortic root at the sinusesof Valsalva is normal in size, measuring 2.80 cm (indexed 1.82 cm/m²).     Pericardium:  No pericardial effusion seen.     Systemic Veins:  The inferior vena cava was not well visualized.  ____________________________________________________________________  QUANTITATIVE DATA:  Left Ventricle Measurements: (Indexed to BSA)     IVSd (2D):   0.7 cm  LVPWd (2D):  0.7 cm  LVIDd (2D):  4.0 cm  LVIDs (2D):  2.4 cm  LV Mass:     84 g   54.7 g/m²  LV Vol d, MOD A2C: 63.3 ml 41.13 ml/m²  LV Vol d, MOD A4C: 76.3 ml 49.58 ml/m²  LV Vol d, MOD BP:  69.3 ml 45.02 ml/m²  LV Vol s, MOD A2C: 23.8 ml 15.46 ml/m²  LV Vol s, MOD A4C: 18.8 ml 12.22 ml/m²  LV Vol s, MOD BP:  22.1 ml 14.36 ml/m²  LVOT SV MOD BP:    47.2 ml  LV EF% MOD BP:     68 %     MV E Vmax: 0.81 m/s  MV A Vmax:    0.98 m/s  MV E/A:       0.82  e' lateral:   11.10 cm/s  e' medial:    8.38 cm/s  E/e' lateral: 7.27  E/e' medial:  9.63  E/e' Average: 8.29  MV DT:        227 msec    Aorta Measurements: (normal range) (Indexed to BSA)     Sinuses of Valsalva: 2.80 cm (2.7 - 3.3 cm)  Ao Asc prox:         2.30 cm       Left Atrium Measurements: (Indexed to BSA)  LA Diam 2D:        3.20 cm  LA Vol s, MOD A4C: 25.90 ml.  LA Vol s, MOD A2C: 32.30 ml.  LA Vol s, MOD BP:  28.80 ml  18.71 ml/m²    Right Ventricle Measurements:     TAPSE:            2.5 cm  TV Marta. S':       11.60 cm/s  RV Base (RVID1):  2.5 cm  RV Mid (RVID2):   2.2 cm  RV Major (RVID3): 5.7 cm       LVOT / RVOT/ Qp/Qs Data: (Indexed to BSA)  LVOT Diameter: 1.90 cm  LVOT Vmax:     1.02 m/s  LVOT VTI:      25.00 cm  LVOT SV:       70.9 ml  46.06 ml/m²    Aortic Valve Measurements:  AV Vmax:                1.3 m/s  AV Peak Gradient:       6.6 mmHg  AV Mean Gradient:       4.0 mmHg  AV VTI:                 29.5 cm  AV VTI Ratio:           0.85  AoV EOA, Contin:        2.40 cm²  AoV EOA, Contin i:      1.56 cm²/m²  AoV Dimensionless Index 0.85    Mitral Valve Measurements:     MV E Vmax: 0.8 m/s  MV A Vmax: 1.0 m/s  MV E/A:    0.8    ________________________________________________________________________________________  Electronically signed on 12/27/2023 at 1:34:39 PM by Oren Ovalle M.D.                Patient is well-known to me.  She underwent cardiac catheterization today at [facility] and is being transferred for coronary bypass surgery by Dr. Edmond Valdez     in brief the patient presented approximately a year and a half ago with unstable angina and had severe disease of the proximal LAD which underwent successful stenting.  She returned in August 2023 with recurrent exertional chest pain and had partial restenosis of the LAD stent with some disease more proximally.  She was fine until the last few weeks when she developed exertional pain while swimming and then exertional discomfort while walking briskly.  A nuclear stress test suggested LAD disease     she has risk factors of hypertension and hyperlipidemia all of which have been well-controlled   she underwent repeat catheterization at [facility] today which revealed severe disease in the left main which had progressed and disease in the proximal LAD with a patent proximal stent but disease in the distal portion of the stent with a subtotal occlusion.  This was documented by IVUS which did reveal significant plaque soft in nature in the left main     2D echo performed today was unremarkable with normal LV function no valvular disease.  LVEDP in the Cath Lab was normal  MEDICATIONS:  MEDICATIONS  (STANDING):    Vital Signs Last 24 Hrs  T(C): 36.7 (12-27-23 @ 15:28), Max: 36.7 (12-27-23 @ 15:28)  T(F): 98.1 (12-27-23 @ 15:28), Max: 98.1 (12-27-23 @ 15:28)  HR: 60 (12-27-23 @ 15:28) (60 - 60)  BP: 111/73 (12-27-23 @ 15:28) (111/73 - 111/73)  BP(mean): --  RR: 18 (12-27-23 @ 15:28) (18 - 18)  SpO2: 97% (12-27-23 @ 15:28) (97% - 97%)  Appearance: Normal	 looks well in good shape no acute distress in reasonable spirits  HEENT:   Normal oral mucosa, PERRL, EOMI	  Cardiovascular: Normal S1 S2, No JVD, No murmurs ,  Respiratory: Lungs clear to auscultation, normal effort 	  Gastrointestinal:  Soft, Non-tender, + BS	  Skin: No rashes, No ecchymoses, No cyanosis, warm to touch  Musculoskeletal: Normal range of motion, normal strength  Psychiatry:  Mood & affect appropriate  Ext: No edema  Peripheral pulses palpable 2+ bilaterally      LABS:    CARDIAC MARKERS:                                12.0   5.82  )-----------( 207      ( 27 Dec 2023 06:00 )             35.7     12-27    143  |  107  |  15  ----------------------------<  95  3.9   |  25  |  0.87    Ca    8.9      27 Dec 2023 06:00      proBNP:   Lipid Profile:   HgA1c:   TSH:           TELEMETRY: 	    ECG:  normal sinus rhythm within normal limit 	  RADIOLOGY:   < from: TTE W or WO Ultrasound Enhancing Agent (12.27.23 @ 12:02) >  CONCLUSIONS:      1. Left ventricular cavity is normal. Left ventricular wall thickness is normal. Left ventricular systolic function is normal with an ejection fraction of 68 % by Fajardo's method of disks. There are no regional wall motion abnormalities seen.   2. Normal left ventricular diastolic function.   3. Normal right ventricular cavity size and systolic function.   4. Structurally normal mitral valve with normal leaflet excursion. There is trace mitral regurgitation.    ________________________________________________________________________________________  FINDINGS:     Left Ventricle:  The left ventricular cavity is normal. Left ventricular wall thickness is normal. Left ventricular systolic function is normal with a calculated ejection fraction of 68% by the Fajardo's biplane method of disks. There are no regional wall motion abnormalities seen. There is normal left ventricular diastolic function.     Right Ventricle:  The right ventricular cavity is normal in size and normal systolic function. Tricuspid annular plane systolic excursion (TAPSE) is 2.5 cm (normal >=1.7 cm).     Left Atrium:  The left atrium is normal with an indexed volume of 18.71 ml/m².     Right Atrium:  The right atrium is normal in size.     Aortic Valve:  Aortic valve leaflet morphology not well visualized. There is no evidence of aortic regurgitation.     Mitral Valve:  Structurally normal mitral valve with normal leaflet excursion. There is trace mitral regurgitation.     Tricuspid Valve:  Structurally normal tricuspid valve with normal leaflet excursion. There is trace tricuspid regurgitation.     Pulmonic Valve:  Structurally normal pulmonic valve with normal leaflet excursion. There is trace pulmonic regurgitation.     Aorta:  The aortic root at the sinusesof Valsalva is normal in size, measuring 2.80 cm (indexed 1.82 cm/m²).     Pericardium:  No pericardial effusion seen.     Systemic Veins:  The inferior vena cava was not well visualized.  ____________________________________________________________________  QUANTITATIVE DATA:  Left Ventricle Measurements: (Indexed to BSA)     IVSd (2D):   0.7 cm  LVPWd (2D):  0.7 cm  LVIDd (2D):  4.0 cm  LVIDs (2D):  2.4 cm  LV Mass:     84 g   54.7 g/m²  LV Vol d, MOD A2C: 63.3 ml 41.13 ml/m²  LV Vol d, MOD A4C: 76.3 ml 49.58 ml/m²  LV Vol d, MOD BP:  69.3 ml 45.02 ml/m²  LV Vol s, MOD A2C: 23.8 ml 15.46 ml/m²  LV Vol s, MOD A4C: 18.8 ml 12.22 ml/m²  LV Vol s, MOD BP:  22.1 ml 14.36 ml/m²  LVOT SV MOD BP:    47.2 ml  LV EF% MOD BP:     68 %     MV E Vmax: 0.81 m/s  MV A Vmax:    0.98 m/s  MV E/A:       0.82  e' lateral:   11.10 cm/s  e' medial:    8.38 cm/s  E/e' lateral: 7.27  E/e' medial:  9.63  E/e' Average: 8.29  MV DT:        227 msec    Aorta Measurements: (normal range) (Indexed to BSA)     Sinuses of Valsalva: 2.80 cm (2.7 - 3.3 cm)  Ao Asc prox:         2.30 cm       Left Atrium Measurements: (Indexed to BSA)  LA Diam 2D:        3.20 cm  LA Vol s, MOD A4C: 25.90 ml.  LA Vol s, MOD A2C: 32.30 ml.  LA Vol s, MOD BP:  28.80 ml  18.71 ml/m²    Right Ventricle Measurements:     TAPSE:            2.5 cm  TV Marta. S':       11.60 cm/s  RV Base (RVID1):  2.5 cm  RV Mid (RVID2):   2.2 cm  RV Major (RVID3): 5.7 cm       LVOT / RVOT/ Qp/Qs Data: (Indexed to BSA)  LVOT Diameter: 1.90 cm  LVOT Vmax:     1.02 m/s  LVOT VTI:      25.00 cm  LVOT SV:       70.9 ml  46.06 ml/m²    Aortic Valve Measurements:  AV Vmax:                1.3 m/s  AV Peak Gradient:       6.6 mmHg  AV Mean Gradient:       4.0 mmHg  AV VTI:                 29.5 cm  AV VTI Ratio:           0.85  AoV EOA, Contin:        2.40 cm²  AoV EOA, Contin i:      1.56 cm²/m²  AoV Dimensionless Index 0.85    Mitral Valve Measurements:     MV E Vmax: 0.8 m/s  MV A Vmax: 1.0 m/s  MV E/A:    0.8    ________________________________________________________________________________________  Electronically signed on 12/27/2023 at 1:34:39 PM by Oren Ovalle M.D.                Patient is well-known to me.  She underwent cardiac catheterization today at [facility] and is being transferred for coronary bypass surgery by Dr. Edmond Valdez     in brief the patient presented approximately a year and a half ago with unstable angina and had severe disease of the proximal LAD which underwent successful stenting.  She returned in August 2023 with recurrent exertional chest pain and had partial restenosis of the LAD stent with some disease more proximally.  She was fine until the last few weeks when she developed exertional pain while swimming and then exertional discomfort while walking briskly.  A nuclear stress test suggested LAD disease     she has risk factors of hypertension and hyperlipidemia all of which have been well-controlled   she underwent repeat catheterization at [facility] today which revealed severe disease in the left main which had progressed and disease in the proximal LAD with a patent proximal stent but disease in the distal portion of the stent with a subtotal occlusion.  This was documented by IVUS which did reveal significant plaque soft in nature in the left main     2D echo performed today was unremarkable with normal LV function no valvular disease.  LVEDP in the Cath Lab was normal  MEDICATIONS:  MEDICATIONS  (STANDING):    Vital Signs Last 24 Hrs  T(C): 36.7 (12-27-23 @ 15:28), Max: 36.7 (12-27-23 @ 15:28)  T(F): 98.1 (12-27-23 @ 15:28), Max: 98.1 (12-27-23 @ 15:28)  HR: 60 (12-27-23 @ 15:28) (60 - 60)  BP: 111/73 (12-27-23 @ 15:28) (111/73 - 111/73)  BP(mean): --  RR: 18 (12-27-23 @ 15:28) (18 - 18)  SpO2: 97% (12-27-23 @ 15:28) (97% - 97%)  Appearance: Normal	 looks well in good shape no acute distress in reasonable spirits  HEENT:   Normal oral mucosa, PERRL, EOMI	  Cardiovascular: Normal S1 S2, No JVD, No murmurs ,  Respiratory: Lungs clear to auscultation, normal effort 	  Gastrointestinal:  Soft, Non-tender, + BS	  Skin: No rashes, No ecchymoses, No cyanosis, warm to touch  Musculoskeletal: Normal range of motion, normal strength  Psychiatry:  Mood & affect appropriate  Ext: No edema  Peripheral pulses palpable 2+ bilaterally      LABS:    CARDIAC MARKERS:                                12.0   5.82  )-----------( 207      ( 27 Dec 2023 06:00 )             35.7     12-27    143  |  107  |  15  ----------------------------<  95  3.9   |  25  |  0.87    Ca    8.9      27 Dec 2023 06:00      proBNP:   Lipid Profile:   HgA1c:   TSH:           TELEMETRY: 	    ECG:  normal sinus rhythm within normal limit 	  RADIOLOGY:   < from: TTE W or WO Ultrasound Enhancing Agent (12.27.23 @ 12:02) >  CONCLUSIONS:      1. Left ventricular cavity is normal. Left ventricular wall thickness is normal. Left ventricular systolic function is normal with an ejection fraction of 68 % by Fajardo's method of disks. There are no regional wall motion abnormalities seen.   2. Normal left ventricular diastolic function.   3. Normal right ventricular cavity size and systolic function.   4. Structurally normal mitral valve with normal leaflet excursion. There is trace mitral regurgitation.    ________________________________________________________________________________________  FINDINGS:     Left Ventricle:  The left ventricular cavity is normal. Left ventricular wall thickness is normal. Left ventricular systolic function is normal with a calculated ejection fraction of 68% by the Fajardo's biplane method of disks. There are no regional wall motion abnormalities seen. There is normal left ventricular diastolic function.     Right Ventricle:  The right ventricular cavity is normal in size and normal systolic function. Tricuspid annular plane systolic excursion (TAPSE) is 2.5 cm (normal >=1.7 cm).     Left Atrium:  The left atrium is normal with an indexed volume of 18.71 ml/m².     Right Atrium:  The right atrium is normal in size.     Aortic Valve:  Aortic valve leaflet morphology not well visualized. There is no evidence of aortic regurgitation.     Mitral Valve:  Structurally normal mitral valve with normal leaflet excursion. There is trace mitral regurgitation.     Tricuspid Valve:  Structurally normal tricuspid valve with normal leaflet excursion. There is trace tricuspid regurgitation.     Pulmonic Valve:  Structurally normal pulmonic valve with normal leaflet excursion. There is trace pulmonic regurgitation.     Aorta:  The aortic root at the sinusesof Valsalva is normal in size, measuring 2.80 cm (indexed 1.82 cm/m²).     Pericardium:  No pericardial effusion seen.     Systemic Veins:  The inferior vena cava was not well visualized.  ____________________________________________________________________  QUANTITATIVE DATA:  Left Ventricle Measurements: (Indexed to BSA)     IVSd (2D):   0.7 cm  LVPWd (2D):  0.7 cm  LVIDd (2D):  4.0 cm  LVIDs (2D):  2.4 cm  LV Mass:     84 g   54.7 g/m²  LV Vol d, MOD A2C: 63.3 ml 41.13 ml/m²  LV Vol d, MOD A4C: 76.3 ml 49.58 ml/m²  LV Vol d, MOD BP:  69.3 ml 45.02 ml/m²  LV Vol s, MOD A2C: 23.8 ml 15.46 ml/m²  LV Vol s, MOD A4C: 18.8 ml 12.22 ml/m²  LV Vol s, MOD BP:  22.1 ml 14.36 ml/m²  LVOT SV MOD BP:    47.2 ml  LV EF% MOD BP:     68 %     MV E Vmax: 0.81 m/s  MV A Vmax:    0.98 m/s  MV E/A:       0.82  e' lateral:   11.10 cm/s  e' medial:    8.38 cm/s  E/e' lateral: 7.27  E/e' medial:  9.63  E/e' Average: 8.29  MV DT:        227 msec    Aorta Measurements: (normal range) (Indexed to BSA)     Sinuses of Valsalva: 2.80 cm (2.7 - 3.3 cm)  Ao Asc prox:         2.30 cm       Left Atrium Measurements: (Indexed to BSA)  LA Diam 2D:        3.20 cm  LA Vol s, MOD A4C: 25.90 ml.  LA Vol s, MOD A2C: 32.30 ml.  LA Vol s, MOD BP:  28.80 ml  18.71 ml/m²    Right Ventricle Measurements:     TAPSE:            2.5 cm  TV Marat. S':       11.60 cm/s  RV Base (RVID1):  2.5 cm  RV Mid (RVID2):   2.2 cm  RV Major (RVID3): 5.7 cm       LVOT / RVOT/ Qp/Qs Data: (Indexed to BSA)  LVOT Diameter: 1.90 cm  LVOT Vmax:     1.02 m/s  LVOT VTI:      25.00 cm  LVOT SV:       70.9 ml  46.06 ml/m²    Aortic Valve Measurements:  AV Vmax:                1.3 m/s  AV Peak Gradient:       6.6 mmHg  AV Mean Gradient:       4.0 mmHg  AV VTI:                 29.5 cm  AV VTI Ratio:           0.85  AoV EOA, Contin:        2.40 cm²  AoV EOA, Contin i:      1.56 cm²/m²  AoV Dimensionless Index 0.85    Mitral Valve Measurements:     MV E Vmax: 0.8 m/s  MV A Vmax: 1.0 m/s  MV E/A:    0.8    ________________________________________________________________________________________  Electronically signed on 12/27/2023 at 1:34:39 PM by Oren Ovalle M.D.

## 2023-12-27 NOTE — H&P ADULT - PROBLEM SELECTOR PLAN 1
preop workup in progress  ck preop labs  ck ua/ chest xray/ pft's/ carotids  echo done at [facility] nl; neg valvular disease  upper extremity vein mapping   ck p2y12 in am  continue asa/ statin  start bb   start heparin gttp  OR plan for fri 12/29 if p2y12 wnl   d/w Dr. Valdez preop workup in progress  ck preop labs  ck ua/ chest xray/ pft's/ carotids  echo done at [facility] nl; neg valvular disease  upper extremity vein mapping   ck p2y12 in am  continue asa/ statin  start bb   start heparin gttp  OR plan for fri 12/29 if p2y12 wnl   d/w Dr. Valdez

## 2023-12-27 NOTE — H&P CARDIOLOGY - NSICDXPASTMEDICALHX_GEN_ALL_CORE_FT
PAST MEDICAL HISTORY:  CAD (coronary artery disease)     HLD (hyperlipidemia)     HTN (hypertension)      PAST MEDICAL HISTORY:  CAD (coronary artery disease)     HLD (hyperlipidemia)     HTN (hypertension)     Stented coronary artery

## 2023-12-28 ENCOUNTER — TRANSCRIPTION ENCOUNTER (OUTPATIENT)
Age: 58
End: 2023-12-28

## 2023-12-28 LAB
A1C WITH ESTIMATED AVERAGE GLUCOSE RESULT: 5.6 % — SIGNIFICANT CHANGE UP (ref 4–5.6)
A1C WITH ESTIMATED AVERAGE GLUCOSE RESULT: 5.6 % — SIGNIFICANT CHANGE UP (ref 4–5.6)
ALBUMIN SERPL ELPH-MCNC: 4.5 G/DL — SIGNIFICANT CHANGE UP (ref 3.3–5)
ALBUMIN SERPL ELPH-MCNC: 4.5 G/DL — SIGNIFICANT CHANGE UP (ref 3.3–5)
ALP SERPL-CCNC: 83 U/L — SIGNIFICANT CHANGE UP (ref 40–120)
ALP SERPL-CCNC: 83 U/L — SIGNIFICANT CHANGE UP (ref 40–120)
ALT FLD-CCNC: 23 U/L — SIGNIFICANT CHANGE UP (ref 10–45)
ALT FLD-CCNC: 23 U/L — SIGNIFICANT CHANGE UP (ref 10–45)
ANION GAP SERPL CALC-SCNC: 13 MMOL/L — SIGNIFICANT CHANGE UP (ref 5–17)
ANION GAP SERPL CALC-SCNC: 13 MMOL/L — SIGNIFICANT CHANGE UP (ref 5–17)
APTT BLD: 57.7 SEC — HIGH (ref 24.5–35.6)
APTT BLD: 57.7 SEC — HIGH (ref 24.5–35.6)
APTT BLD: 88.8 SEC — HIGH (ref 24.5–35.6)
APTT BLD: 88.8 SEC — HIGH (ref 24.5–35.6)
AST SERPL-CCNC: 25 U/L — SIGNIFICANT CHANGE UP (ref 10–40)
AST SERPL-CCNC: 25 U/L — SIGNIFICANT CHANGE UP (ref 10–40)
BILIRUB SERPL-MCNC: 0.8 MG/DL — SIGNIFICANT CHANGE UP (ref 0.2–1.2)
BILIRUB SERPL-MCNC: 0.8 MG/DL — SIGNIFICANT CHANGE UP (ref 0.2–1.2)
BUN SERPL-MCNC: 14 MG/DL — SIGNIFICANT CHANGE UP (ref 7–23)
BUN SERPL-MCNC: 14 MG/DL — SIGNIFICANT CHANGE UP (ref 7–23)
CALCIUM SERPL-MCNC: 9.6 MG/DL — SIGNIFICANT CHANGE UP (ref 8.4–10.5)
CALCIUM SERPL-MCNC: 9.6 MG/DL — SIGNIFICANT CHANGE UP (ref 8.4–10.5)
CHLORIDE SERPL-SCNC: 107 MMOL/L — SIGNIFICANT CHANGE UP (ref 96–108)
CHLORIDE SERPL-SCNC: 107 MMOL/L — SIGNIFICANT CHANGE UP (ref 96–108)
CHOLEST SERPL-MCNC: 125 MG/DL — SIGNIFICANT CHANGE UP
CHOLEST SERPL-MCNC: 125 MG/DL — SIGNIFICANT CHANGE UP
CO2 SERPL-SCNC: 23 MMOL/L — SIGNIFICANT CHANGE UP (ref 22–31)
CO2 SERPL-SCNC: 23 MMOL/L — SIGNIFICANT CHANGE UP (ref 22–31)
CREAT SERPL-MCNC: 0.84 MG/DL — SIGNIFICANT CHANGE UP (ref 0.5–1.3)
CREAT SERPL-MCNC: 0.84 MG/DL — SIGNIFICANT CHANGE UP (ref 0.5–1.3)
EGFR: 81 ML/MIN/1.73M2 — SIGNIFICANT CHANGE UP
EGFR: 81 ML/MIN/1.73M2 — SIGNIFICANT CHANGE UP
ESTIMATED AVERAGE GLUCOSE: 114 MG/DL — SIGNIFICANT CHANGE UP (ref 68–114)
ESTIMATED AVERAGE GLUCOSE: 114 MG/DL — SIGNIFICANT CHANGE UP (ref 68–114)
GLUCOSE SERPL-MCNC: 110 MG/DL — HIGH (ref 70–99)
GLUCOSE SERPL-MCNC: 110 MG/DL — HIGH (ref 70–99)
HCT VFR BLD CALC: 35.5 % — SIGNIFICANT CHANGE UP (ref 34.5–45)
HCT VFR BLD CALC: 35.5 % — SIGNIFICANT CHANGE UP (ref 34.5–45)
HDLC SERPL-MCNC: 35 MG/DL — LOW
HDLC SERPL-MCNC: 35 MG/DL — LOW
HGB BLD-MCNC: 12.1 G/DL — SIGNIFICANT CHANGE UP (ref 11.5–15.5)
HGB BLD-MCNC: 12.1 G/DL — SIGNIFICANT CHANGE UP (ref 11.5–15.5)
LIPID PNL WITH DIRECT LDL SERPL: 43 MG/DL — SIGNIFICANT CHANGE UP
LIPID PNL WITH DIRECT LDL SERPL: 43 MG/DL — SIGNIFICANT CHANGE UP
MCHC RBC-ENTMCNC: 28.9 PG — SIGNIFICANT CHANGE UP (ref 27–34)
MCHC RBC-ENTMCNC: 28.9 PG — SIGNIFICANT CHANGE UP (ref 27–34)
MCHC RBC-ENTMCNC: 34.1 GM/DL — SIGNIFICANT CHANGE UP (ref 32–36)
MCHC RBC-ENTMCNC: 34.1 GM/DL — SIGNIFICANT CHANGE UP (ref 32–36)
MCV RBC AUTO: 84.7 FL — SIGNIFICANT CHANGE UP (ref 80–100)
MCV RBC AUTO: 84.7 FL — SIGNIFICANT CHANGE UP (ref 80–100)
MRSA PCR RESULT.: SIGNIFICANT CHANGE UP
MRSA PCR RESULT.: SIGNIFICANT CHANGE UP
NON HDL CHOLESTEROL: 90 MG/DL — SIGNIFICANT CHANGE UP
NON HDL CHOLESTEROL: 90 MG/DL — SIGNIFICANT CHANGE UP
NRBC # BLD: 0 /100 WBCS — SIGNIFICANT CHANGE UP (ref 0–0)
NRBC # BLD: 0 /100 WBCS — SIGNIFICANT CHANGE UP (ref 0–0)
PA ADP PRP-ACNC: 167 PRU — LOW (ref 194–417)
PA ADP PRP-ACNC: 167 PRU — LOW (ref 194–417)
PLATELET # BLD AUTO: 198 K/UL — SIGNIFICANT CHANGE UP (ref 150–400)
PLATELET # BLD AUTO: 198 K/UL — SIGNIFICANT CHANGE UP (ref 150–400)
POTASSIUM SERPL-MCNC: 4.5 MMOL/L — SIGNIFICANT CHANGE UP (ref 3.5–5.3)
POTASSIUM SERPL-MCNC: 4.5 MMOL/L — SIGNIFICANT CHANGE UP (ref 3.5–5.3)
POTASSIUM SERPL-SCNC: 4.5 MMOL/L — SIGNIFICANT CHANGE UP (ref 3.5–5.3)
POTASSIUM SERPL-SCNC: 4.5 MMOL/L — SIGNIFICANT CHANGE UP (ref 3.5–5.3)
PROT SERPL-MCNC: 7.1 G/DL — SIGNIFICANT CHANGE UP (ref 6–8.3)
PROT SERPL-MCNC: 7.1 G/DL — SIGNIFICANT CHANGE UP (ref 6–8.3)
RBC # BLD: 4.19 M/UL — SIGNIFICANT CHANGE UP (ref 3.8–5.2)
RBC # BLD: 4.19 M/UL — SIGNIFICANT CHANGE UP (ref 3.8–5.2)
RBC # FLD: 12.3 % — SIGNIFICANT CHANGE UP (ref 10.3–14.5)
RBC # FLD: 12.3 % — SIGNIFICANT CHANGE UP (ref 10.3–14.5)
S AUREUS DNA NOSE QL NAA+PROBE: DETECTED
S AUREUS DNA NOSE QL NAA+PROBE: DETECTED
SODIUM SERPL-SCNC: 143 MMOL/L — SIGNIFICANT CHANGE UP (ref 135–145)
SODIUM SERPL-SCNC: 143 MMOL/L — SIGNIFICANT CHANGE UP (ref 135–145)
TRIGL SERPL-MCNC: 306 MG/DL — HIGH
TRIGL SERPL-MCNC: 306 MG/DL — HIGH
TSH SERPL-MCNC: 0.94 UIU/ML — SIGNIFICANT CHANGE UP (ref 0.27–4.2)
TSH SERPL-MCNC: 0.94 UIU/ML — SIGNIFICANT CHANGE UP (ref 0.27–4.2)
WBC # BLD: 6.18 K/UL — SIGNIFICANT CHANGE UP (ref 3.8–10.5)
WBC # BLD: 6.18 K/UL — SIGNIFICANT CHANGE UP (ref 3.8–10.5)
WBC # FLD AUTO: 6.18 K/UL — SIGNIFICANT CHANGE UP (ref 3.8–10.5)
WBC # FLD AUTO: 6.18 K/UL — SIGNIFICANT CHANGE UP (ref 3.8–10.5)

## 2023-12-28 PROCEDURE — 94010 BREATHING CAPACITY TEST: CPT | Mod: 26

## 2023-12-28 RX ORDER — MUPIROCIN 20 MG/G
1 OINTMENT TOPICAL
Refills: 0 | Status: DISCONTINUED | OUTPATIENT
Start: 2023-12-28 | End: 2023-12-29

## 2023-12-28 RX ORDER — GABAPENTIN 400 MG/1
300 CAPSULE ORAL ONCE
Refills: 0 | Status: COMPLETED | OUTPATIENT
Start: 2023-12-28 | End: 2023-12-29

## 2023-12-28 RX ORDER — CHLORHEXIDINE GLUCONATE 213 G/1000ML
30 SOLUTION TOPICAL ONCE
Refills: 0 | Status: COMPLETED | OUTPATIENT
Start: 2023-12-28 | End: 2023-12-29

## 2023-12-28 RX ORDER — CHLORHEXIDINE GLUCONATE 213 G/1000ML
1 SOLUTION TOPICAL ONCE
Refills: 0 | Status: COMPLETED | OUTPATIENT
Start: 2023-12-28 | End: 2023-12-28

## 2023-12-28 RX ORDER — ACETAMINOPHEN 500 MG
1000 TABLET ORAL ONCE
Refills: 0 | Status: COMPLETED | OUTPATIENT
Start: 2023-12-28 | End: 2023-12-29

## 2023-12-28 RX ORDER — CEFUROXIME AXETIL 250 MG
1500 TABLET ORAL ONCE
Refills: 0 | Status: COMPLETED | OUTPATIENT
Start: 2023-12-28 | End: 2023-12-28

## 2023-12-28 RX ORDER — ASCORBIC ACID 60 MG
2000 TABLET,CHEWABLE ORAL ONCE
Refills: 0 | Status: COMPLETED | OUTPATIENT
Start: 2023-12-28 | End: 2023-12-29

## 2023-12-28 RX ORDER — CHLORHEXIDINE GLUCONATE 213 G/1000ML
1 SOLUTION TOPICAL ONCE
Refills: 0 | Status: COMPLETED | OUTPATIENT
Start: 2023-12-29 | End: 2023-12-29

## 2023-12-28 RX ADMIN — Medication 12.5 MILLIGRAM(S): at 05:21

## 2023-12-28 RX ADMIN — Medication 12.5 MILLIGRAM(S): at 17:35

## 2023-12-28 RX ADMIN — HEPARIN SODIUM 8.5 UNIT(S)/HR: 5000 INJECTION INTRAVENOUS; SUBCUTANEOUS at 04:06

## 2023-12-28 RX ADMIN — CHLORHEXIDINE GLUCONATE 1 APPLICATION(S): 213 SOLUTION TOPICAL at 20:06

## 2023-12-28 RX ADMIN — Medication 81 MILLIGRAM(S): at 11:32

## 2023-12-28 RX ADMIN — HEPARIN SODIUM 8.5 UNIT(S)/HR: 5000 INJECTION INTRAVENOUS; SUBCUTANEOUS at 11:32

## 2023-12-28 RX ADMIN — ATORVASTATIN CALCIUM 40 MILLIGRAM(S): 80 TABLET, FILM COATED ORAL at 21:59

## 2023-12-28 RX ADMIN — MUPIROCIN 1 APPLICATION(S): 20 OINTMENT TOPICAL at 17:35

## 2023-12-28 RX ADMIN — Medication 100 MILLIGRAM(S): at 21:57

## 2023-12-28 NOTE — PRE PROCEDURE NOTE - PRE PROCEDURE EVALUATION
Cardiac Surgery Pre-op Note:  CC: Patient is a 58y old  Female who presents with a chief complaint of CP (27 Dec 2023 16:24)                                                                                            Surgeon: Dr. Valdez  Procedure: (Date) (Procedure)  CABG    Allergies    No Known Allergies    Intolerances      HPI:  58 Fw/ pmh of htn, hld, cardiac stents-2, second hand smoke exposure presents today for elective cardiac catheterization at [facility] with . The patient c/o midsternal chest pain, aggravate with exertion (swimming and fast walking), 7/10, doesn't radiate, resolve with rest.  She denies SOB, palpitations, presyncope, syncope,  headache, visual disturbances, CVA, PE, DVT, MARIA INES, abdominal pain, N/V/D/C, hematochezia, melena, dysuria, hematuria, fever, chills. The patient was evaluated by a cardiologist, was found to have abnormal stress test.  Due to patient's symptoms and abnormal non invasive findings, the patient  was recommended to have cardiac catheterization. Stress test 23 There is a large-sized, severe defect in the anteropical and inferoapical walls consistent with infarct with severe moncho-infarct ischemia. There is a large sized, moderate to severe defect in the distal anteroseptal wall consistent with infarct with moderate to severe moncho-infarct ischemia; pt now s/p cath at [facility] which revealed + CAD; pt transferred to [facility] for OHS - CABG eval with Dr. Valdez. Pt denies cp/ sob at this time.         (27 Dec 2023 16:24)      PAST MEDICAL & SURGICAL HISTORY:  HLD (hyperlipidemia)      CAD (coronary artery disease)      HTN (hypertension)      Stented coronary artery      S/P appendectomy      S/P       S/P coronary artery stent placement          MEDICATIONS  (STANDING):  aspirin enteric coated 81 milliGRAM(s) Oral daily  atorvastatin 40 milliGRAM(s) Oral at bedtime  heparin  Infusion 800 Unit(s)/Hr (8.5 mL/Hr) IV Continuous <Continuous>  metoprolol tartrate 12.5 milliGRAM(s) Oral two times a day    MEDICATIONS  (PRN):  acetaminophen     Tablet .. 650 milliGRAM(s) Oral every 6 hours PRN Temp greater or equal to 38C (100.4F), Mild Pain (1 - 3)  melatonin 3 milliGRAM(s) Oral at bedtime PRN Insomnia      Labs:                        11.6   6.47  )-----------( 184      ( 27 Dec 2023 16:36 )             34.0         142  |  108  |  17  ----------------------------<  108<H>  4.1   |  26  |  0.94    Ca    9.1      27 Dec 2023 16:37    TPro  6.2  /  Alb  4.0  /  TBili  0.5  /  DBili  x   /  AST  15  /  ALT  18  /  AlkPhos  79      PT/INR - ( 27 Dec 2023 16:36 )   PT: 11.1 sec;   INR: 1.01 ratio         PTT - ( 28 Dec 2023 01:12 )  PTT:57.7 sec    Blood Type: ABO Interpretation: B ( @ 16:44)    HGB A1C: 5.6  Pro-BNP:   Thyroid Panel:  @ 16:36/0.94  --/--/--    MRSA:  / MSSA: pending  Urinalysis Basic - ( 27 Dec 2023 17:32 )    Color: Yellow / Appearance: Clear / S.010 / pH: x  Gluc: x / Ketone: Negative mg/dL  / Bili: Negative / Urobili: 0.2 mg/dL   Blood: x / Protein: Negative mg/dL / Nitrite: Negative   Leuk Esterase: Trace / RBC: 0 /HPF / WBC 1 /HPF   Sq Epi: x / Non Sq Epi: 0 /HPF / Bacteria: Negative /HPF        CXR:     EKG: pending    Carotid Duplex: < from: VA Duplex Carotid, Bilat (23 @ 20:02) >   No significant hemodynamic stenosis of either carotid artery.    PFT's: pending    Echocardiogram: < from: TTE W or WO Ultrasound Enhancing Agent (23 @ 12:02) >   1. Left ventricular cavity is normal. Left ventricular wall thickness is normal. Left ventricular systolic function is normal with an ejection fraction of 68 % by Fajardo's method of disks. There are no regional wall motion abnormalities seen.   2. Normal left ventricular diastolic function.   3. Normal right ventricular cavity size and systolic function.   4. Structurally normal mitral valve with normal leaflet excursion. There is trace mitral regurgitation.    Cardiac catheterization: < from: Cardiac Catheterization (23 @ 08:38) >  LM   Left main artery: IVUS of left main soft plaque 70% stemois area 4,0.  There is a 70 % stenosis in the proximal third portion of  the segment. Imaging shows plaque is soft and minimal cross sectional  area of 4.0 mm2.    LAD   Left anterior descending artery: just proximal to previous LAD stent.  There is a stenosis in the proximal third portion of the  segment. Mid left anterior descending: subtotally occluded at distal  end of stent. There is a stenosis in the distal third portion of  the segment.      CX   Circumflex: Angiography shows minor irregularities.      RCA   Right coronary artery: Angiography shows minor irregularities.      Ramus   Ramus intermedius: Angiography shows minor irregularities.  Intravascular ultrasound was performed.    Gen: WN/WD NAD  Neuro: AAOx3, nonfocal  Pulm: CTA B/L  CV: RRR, S1S2 +systolic murmur  Abd: Soft, NT, ND +BS  Ext: No edema, + peripheral pulses      Pt has AICD/PPM [ ] Yes  [x ] No             Brand Name:  Pre-op Beta Blocker ordered within 24 hrs of surgery (CABG ONLY)?  [x ] Yes  [ ] No  If not, Why?  Type & Cross  [x ] Yes  [ ] No  NPO after Midnight [x ] Yes  [ ] No  Pre-op ABX ordered, to be taped on chart:  [ x] Yes  [ ] No     Hibiclens/Peridex ordered [x ] Yes  [ ] No  Intraop on Hold: PRBCs, CXR, LARS [x ]   Consent obtained  [x ] Yes  [ ] No   Cardiac Surgery Pre-op Note:  CC: Patient is a 58y old  Female who presents with a chief complaint of CP (27 Dec 2023 16:24)                                                                                            Surgeon: Dr. Valdez  Procedure: (Date) (Procedure)  CABG    Allergies    No Known Allergies    Intolerances      HPI:  58 Fw/ pmh of htn, hld, cardiac stents-2, second hand smoke exposure presents today for elective cardiac catheterization at [facility] with . The patient c/o midsternal chest pain, aggravate with exertion (swimming and fast walking), 7/10, doesn't radiate, resolve with rest.  She denies SOB, palpitations, presyncope, syncope,  headache, visual disturbances, CVA, PE, DVT, MARIA INES, abdominal pain, N/V/D/C, hematochezia, melena, dysuria, hematuria, fever, chills. The patient was evaluated by a cardiologist, was found to have abnormal stress test.  Due to patient's symptoms and abnormal non invasive findings, the patient  was recommended to have cardiac catheterization. Stress test 23 There is a large-sized, severe defect in the anteropical and inferoapical walls consistent with infarct with severe moncho-infarct ischemia. There is a large sized, moderate to severe defect in the distal anteroseptal wall consistent with infarct with moderate to severe omncho-infarct ischemia; pt now s/p cath at [facility] which revealed + CAD; pt transferred to [facility] for OHS - CABG eval with Dr. Valdez. Pt denies cp/ sob at this time.         (27 Dec 2023 16:24)      PAST MEDICAL & SURGICAL HISTORY:  HLD (hyperlipidemia)      CAD (coronary artery disease)      HTN (hypertension)      Stented coronary artery      S/P appendectomy      S/P       S/P coronary artery stent placement          MEDICATIONS  (STANDING):  aspirin enteric coated 81 milliGRAM(s) Oral daily  atorvastatin 40 milliGRAM(s) Oral at bedtime  heparin  Infusion 800 Unit(s)/Hr (8.5 mL/Hr) IV Continuous <Continuous>  metoprolol tartrate 12.5 milliGRAM(s) Oral two times a day    MEDICATIONS  (PRN):  acetaminophen     Tablet .. 650 milliGRAM(s) Oral every 6 hours PRN Temp greater or equal to 38C (100.4F), Mild Pain (1 - 3)  melatonin 3 milliGRAM(s) Oral at bedtime PRN Insomnia      Labs:                        11.6   6.47  )-----------( 184      ( 27 Dec 2023 16:36 )             34.0         142  |  108  |  17  ----------------------------<  108<H>  4.1   |  26  |  0.94    Ca    9.1      27 Dec 2023 16:37    TPro  6.2  /  Alb  4.0  /  TBili  0.5  /  DBili  x   /  AST  15  /  ALT  18  /  AlkPhos  79      PT/INR - ( 27 Dec 2023 16:36 )   PT: 11.1 sec;   INR: 1.01 ratio         PTT - ( 28 Dec 2023 01:12 )  PTT:57.7 sec    Blood Type: ABO Interpretation: B ( @ 16:44)    HGB A1C: 5.6  Pro-BNP:   Thyroid Panel:  @ 16:36/0.94  --/--/--    MRSA:  / MSSA: pending  Urinalysis Basic - ( 27 Dec 2023 17:32 )    Color: Yellow / Appearance: Clear / S.010 / pH: x  Gluc: x / Ketone: Negative mg/dL  / Bili: Negative / Urobili: 0.2 mg/dL   Blood: x / Protein: Negative mg/dL / Nitrite: Negative   Leuk Esterase: Trace / RBC: 0 /HPF / WBC 1 /HPF   Sq Epi: x / Non Sq Epi: 0 /HPF / Bacteria: Negative /HPF        CXR:     EKG: pending    Carotid Duplex: < from: VA Duplex Carotid, Bilat (23 @ 20:02) >   No significant hemodynamic stenosis of either carotid artery.    PFT's: pending    Echocardiogram: < from: TTE W or WO Ultrasound Enhancing Agent (23 @ 12:02) >   1. Left ventricular cavity is normal. Left ventricular wall thickness is normal. Left ventricular systolic function is normal with an ejection fraction of 68 % by Fajardo's method of disks. There are no regional wall motion abnormalities seen.   2. Normal left ventricular diastolic function.   3. Normal right ventricular cavity size and systolic function.   4. Structurally normal mitral valve with normal leaflet excursion. There is trace mitral regurgitation.    Cardiac catheterization: < from: Cardiac Catheterization (23 @ 08:38) >  LM   Left main artery: IVUS of left main soft plaque 70% stemois area 4,0.  There is a 70 % stenosis in the proximal third portion of  the segment. Imaging shows plaque is soft and minimal cross sectional  area of 4.0 mm2.    LAD   Left anterior descending artery: just proximal to previous LAD stent.  There is a stenosis in the proximal third portion of the  segment. Mid left anterior descending: subtotally occluded at distal  end of stent. There is a stenosis in the distal third portion of  the segment.      CX   Circumflex: Angiography shows minor irregularities.      RCA   Right coronary artery: Angiography shows minor irregularities.      Ramus   Ramus intermedius: Angiography shows minor irregularities.  Intravascular ultrasound was performed.    Gen: WN/WD NAD  Neuro: AAOx3, nonfocal  Pulm: CTA B/L  CV: RRR, S1S2 +systolic murmur  Abd: Soft, NT, ND +BS  Ext: No edema, + peripheral pulses      Pt has AICD/PPM [ ] Yes  [x ] No             Brand Name:  Pre-op Beta Blocker ordered within 24 hrs of surgery (CABG ONLY)?  [x ] Yes  [ ] No  If not, Why?  Type & Cross  [x ] Yes  [ ] No  NPO after Midnight [x ] Yes  [ ] No  Pre-op ABX ordered, to be taped on chart:  [ x] Yes  [ ] No     Hibiclens/Peridex ordered [x ] Yes  [ ] No  Intraop on Hold: PRBCs, CXR, LARS [x ]   Consent obtained  [x ] Yes  [ ] No   Cardiac Surgery Pre-op Note:  CC: Patient is a 58y old  Female who presents with a chief complaint of CP (27 Dec 2023 16:24)                                                                                            Surgeon: Dr. Valdez  Procedure: (Date) (Procedure)  CABG    Allergies    No Known Allergies    Intolerances      HPI:  58 Fw/ pmh of htn, hld, cardiac stents-2, second hand smoke exposure presents today for elective cardiac catheterization at [facility] with . The patient c/o midsternal chest pain, aggravate with exertion (swimming and fast walking), 7/10, doesn't radiate, resolve with rest.  She denies SOB, palpitations, presyncope, syncope,  headache, visual disturbances, CVA, PE, DVT, MARIA INES, abdominal pain, N/V/D/C, hematochezia, melena, dysuria, hematuria, fever, chills. The patient was evaluated by a cardiologist, was found to have abnormal stress test.  Due to patient's symptoms and abnormal non invasive findings, the patient  was recommended to have cardiac catheterization. Stress test 23 There is a large-sized, severe defect in the anteropical and inferoapical walls consistent with infarct with severe moncho-infarct ischemia. There is a large sized, moderate to severe defect in the distal anteroseptal wall consistent with infarct with moderate to severe moncho-infarct ischemia; pt now s/p cath at [facility] which revealed + CAD; pt transferred to [facility] for OHS - CABG eval with Dr. Valdez. Pt denies cp/ sob at this time.         (27 Dec 2023 16:24)      PAST MEDICAL & SURGICAL HISTORY:  HLD (hyperlipidemia)      CAD (coronary artery disease)      HTN (hypertension)      Stented coronary artery      S/P appendectomy      S/P       S/P coronary artery stent placement          MEDICATIONS  (STANDING):  aspirin enteric coated 81 milliGRAM(s) Oral daily  atorvastatin 40 milliGRAM(s) Oral at bedtime  heparin  Infusion 800 Unit(s)/Hr (8.5 mL/Hr) IV Continuous <Continuous>  metoprolol tartrate 12.5 milliGRAM(s) Oral two times a day    MEDICATIONS  (PRN):  acetaminophen     Tablet .. 650 milliGRAM(s) Oral every 6 hours PRN Temp greater or equal to 38C (100.4F), Mild Pain (1 - 3)  melatonin 3 milliGRAM(s) Oral at bedtime PRN Insomnia      Labs:                        11.6   6.47  )-----------( 184      ( 27 Dec 2023 16:36 )             34.0         142  |  108  |  17  ----------------------------<  108<H>  4.1   |  26  |  0.94    Ca    9.1      27 Dec 2023 16:37    TPro  6.2  /  Alb  4.0  /  TBili  0.5  /  DBili  x   /  AST  15  /  ALT  18  /  AlkPhos  79      PT/INR - ( 27 Dec 2023 16:36 )   PT: 11.1 sec;   INR: 1.01 ratio         PTT - ( 28 Dec 2023 01:12 )  PTT:57.7 sec    Blood Type: ABO Interpretation: B ( @ 16:44)    HGB A1C: 5.6  Pro-BNP: pending  Thyroid Panel:  @ 16:36/0.94  --/--/--    MRSA:  / MSSA: pending  Urinalysis Basic - ( 27 Dec 2023 17:32 )    Color: Yellow / Appearance: Clear / S.010 / pH: x  Gluc: x / Ketone: Negative mg/dL  / Bili: Negative / Urobili: 0.2 mg/dL   Blood: x / Protein: Negative mg/dL / Nitrite: Negative   Leuk Esterase: Trace / RBC: 0 /HPF / WBC 1 /HPF   Sq Epi: x / Non Sq Epi: 0 /HPF / Bacteria: Negative /HPF    CXR:     EKG: SR    Carotid Duplex: < from: VA Duplex Carotid, Bilat (23 @ 20:02) >   No significant hemodynamic stenosis of either carotid artery.    PFT's: pending    Echocardiogram: < from: TTE W or WO Ultrasound Enhancing Agent (23 @ 12:02) >   1. Left ventricular cavity is normal. Left ventricular wall thickness is normal. Left ventricular systolic function is normal with an ejection fraction of 68 % by Fajardo's method of disks. There are no regional wall motion abnormalities seen.   2. Normal left ventricular diastolic function.   3. Normal right ventricular cavity size and systolic function.   4. Structurally normal mitral valve with normal leaflet excursion. There is trace mitral regurgitation.    Cardiac catheterization: < from: Cardiac Catheterization (23 @ 08:38) >  LM   Left main artery: IVUS of left main soft plaque 70% stemois area 4,0.  There is a 70 % stenosis in the proximal third portion of  the segment. Imaging shows plaque is soft and minimal cross sectional  area of 4.0 mm2.    LAD   Left anterior descending artery: just proximal to previous LAD stent.  There is a stenosis in the proximal third portion of the  segment. Mid left anterior descending: subtotally occluded at distal  end of stent. There is a stenosis in the distal third portion of  the segment.      CX   Circumflex: Angiography shows minor irregularities.      RCA   Right coronary artery: Angiography shows minor irregularities.      Ramus   Ramus intermedius: Angiography shows minor irregularities.  Intravascular ultrasound was performed.    Gen: WN/WD NAD  Neuro: AAOx3, nonfocal  Pulm: CTA B/L  CV: RRR, S1S2 +systolic murmur  Abd: Soft, NT, ND +BS  Ext: No edema, + peripheral pulses      Pt has AICD/PPM [ ] Yes  [x ] No             Brand Name:  Pre-op Beta Blocker ordered within 24 hrs of surgery (CABG ONLY)?  [x ] Yes  [ ] No  If not, Why?  Type & Cross  [x ] Yes  [ ] No  NPO after Midnight [x ] Yes  [ ] No  Pre-op ABX ordered, to be taped on chart:  [ x] Yes  [ ] No     Hibiclens/Peridex ordered [x ] Yes  [ ] No  Intraop on Hold: PRBCs, CXR, LARS [x ]   Consent obtained  [x ] Yes  [ ] No   Cardiac Surgery Pre-op Note:  CC: Patient is a 58y old  Female who presents with a chief complaint of CP (27 Dec 2023 16:24)                                                                                            Surgeon: Dr. Valdez  Procedure: (Date) (Procedure)  CABG    Allergies    No Known Allergies    Intolerances      HPI:  58 Fw/ pmh of htn, hld, cardiac stents-2, second hand smoke exposure presents today for elective cardiac catheterization at [facility] with . The patient c/o midsternal chest pain, aggravate with exertion (swimming and fast walking), 7/10, doesn't radiate, resolve with rest.  She denies SOB, palpitations, presyncope, syncope,  headache, visual disturbances, CVA, PE, DVT, MARIA INES, abdominal pain, N/V/D/C, hematochezia, melena, dysuria, hematuria, fever, chills. The patient was evaluated by a cardiologist, was found to have abnormal stress test.  Due to patient's symptoms and abnormal non invasive findings, the patient  was recommended to have cardiac catheterization. Stress test 23 There is a large-sized, severe defect in the anteropical and inferoapical walls consistent with infarct with severe moncho-infarct ischemia. There is a large sized, moderate to severe defect in the distal anteroseptal wall consistent with infarct with moderate to severe moncho-infarct ischemia; pt now s/p cath at [facility] which revealed + CAD; pt transferred to [facility] for OHS - CABG eval with Dr. Valdez. Pt denies cp/ sob at this time.         (27 Dec 2023 16:24)      PAST MEDICAL & SURGICAL HISTORY:  HLD (hyperlipidemia)      CAD (coronary artery disease)      HTN (hypertension)      Stented coronary artery      S/P appendectomy      S/P       S/P coronary artery stent placement          MEDICATIONS  (STANDING):  aspirin enteric coated 81 milliGRAM(s) Oral daily  atorvastatin 40 milliGRAM(s) Oral at bedtime  heparin  Infusion 800 Unit(s)/Hr (8.5 mL/Hr) IV Continuous <Continuous>  metoprolol tartrate 12.5 milliGRAM(s) Oral two times a day    MEDICATIONS  (PRN):  acetaminophen     Tablet .. 650 milliGRAM(s) Oral every 6 hours PRN Temp greater or equal to 38C (100.4F), Mild Pain (1 - 3)  melatonin 3 milliGRAM(s) Oral at bedtime PRN Insomnia      Labs:                        11.6   6.47  )-----------( 184      ( 27 Dec 2023 16:36 )             34.0         142  |  108  |  17  ----------------------------<  108<H>  4.1   |  26  |  0.94    Ca    9.1      27 Dec 2023 16:37    TPro  6.2  /  Alb  4.0  /  TBili  0.5  /  DBili  x   /  AST  15  /  ALT  18  /  AlkPhos  79      PT/INR - ( 27 Dec 2023 16:36 )   PT: 11.1 sec;   INR: 1.01 ratio         PTT - ( 28 Dec 2023 01:12 )  PTT:57.7 sec    Blood Type: ABO Interpretation: B ( @ 16:44)    HGB A1C: 5.6  Pro-BNP: pending  Thyroid Panel:  @ 16:36/0.94  --/--/--    MRSA:  / MSSA: pending  Urinalysis Basic - ( 27 Dec 2023 17:32 )    Color: Yellow / Appearance: Clear / S.010 / pH: x  Gluc: x / Ketone: Negative mg/dL  / Bili: Negative / Urobili: 0.2 mg/dL   Blood: x / Protein: Negative mg/dL / Nitrite: Negative   Leuk Esterase: Trace / RBC: 0 /HPF / WBC 1 /HPF   Sq Epi: x / Non Sq Epi: 0 /HPF / Bacteria: Negative /HPF    CXR:     EKG: SR    Carotid Duplex: < from: VA Duplex Carotid, Bilat (23 @ 20:02) >   No significant hemodynamic stenosis of either carotid artery.    PFT's: pending    Echocardiogram: < from: TTE W or WO Ultrasound Enhancing Agent (23 @ 12:02) >   1. Left ventricular cavity is normal. Left ventricular wall thickness is normal. Left ventricular systolic function is normal with an ejection fraction of 68 % by Fajardo's method of disks. There are no regional wall motion abnormalities seen.   2. Normal left ventricular diastolic function.   3. Normal right ventricular cavity size and systolic function.   4. Structurally normal mitral valve with normal leaflet excursion. There is trace mitral regurgitation.    Cardiac catheterization: < from: Cardiac Catheterization (23 @ 08:38) >  LM   Left main artery: IVUS of left main soft plaque 70% stemois area 4,0.  There is a 70 % stenosis in the proximal third portion of  the segment. Imaging shows plaque is soft and minimal cross sectional  area of 4.0 mm2.    LAD   Left anterior descending artery: just proximal to previous LAD stent.  There is a stenosis in the proximal third portion of the  segment. Mid left anterior descending: subtotally occluded at distal  end of stent. There is a stenosis in the distal third portion of  the segment.      CX   Circumflex: Angiography shows minor irregularities.      RCA   Right coronary artery: Angiography shows minor irregularities.      Ramus   Ramus intermedius: Angiography shows minor irregularities.  Intravascular ultrasound was performed.    Gen: WN/WD NAD  Neuro: AAOx3, nonfocal  Pulm: CTA B/L  CV: RRR, S1S2 +systolic murmur  Abd: Soft, NT, ND +BS  Ext: No edema, + peripheral pulses      Pt has AICD/PPM [ ] Yes  [x ] No             Brand Name:  Pre-op Beta Blocker ordered within 24 hrs of surgery (CABG ONLY)?  [x ] Yes  [ ] No  If not, Why?  Type & Cross  [x ] Yes  [ ] No  NPO after Midnight [x ] Yes  [ ] No  Pre-op ABX ordered, to be taped on chart:  [ x] Yes  [ ] No     Hibiclens/Peridex ordered [x ] Yes  [ ] No  Intraop on Hold: PRBCs, CXR, LARS [x ]   Consent obtained  [x ] Yes  [ ] No   Cardiac Surgery Pre-op Note:  CC: Patient is a 58y old  Female who presents with a chief complaint of CP (27 Dec 2023 16:24)                                                                                            Surgeon: Dr. Valdez  Procedure: (Date) (Procedure)  CABG    Allergies    No Known Allergies    Intolerances      HPI:  58 Fw/ pmh of htn, hld, cardiac stents-2, second hand smoke exposure presents today for elective cardiac catheterization at [facility] with . The patient c/o midsternal chest pain, aggravate with exertion (swimming and fast walking), 7/10, doesn't radiate, resolve with rest.  She denies SOB, palpitations, presyncope, syncope,  headache, visual disturbances, CVA, PE, DVT, MARIA INES, abdominal pain, N/V/D/C, hematochezia, melena, dysuria, hematuria, fever, chills. The patient was evaluated by a cardiologist, was found to have abnormal stress test.  Due to patient's symptoms and abnormal non invasive findings, the patient  was recommended to have cardiac catheterization. Stress test 23 There is a large-sized, severe defect in the anteropical and inferoapical walls consistent with infarct with severe moncho-infarct ischemia. There is a large sized, moderate to severe defect in the distal anteroseptal wall consistent with infarct with moderate to severe moncho-infarct ischemia; pt now s/p cath at [facility] which revealed + CAD; pt transferred to [facility] for OHS - CABG eval with Dr. Valdez. Pt denies cp/ sob at this time.         (27 Dec 2023 16:24)      PAST MEDICAL & SURGICAL HISTORY:  HLD (hyperlipidemia)      CAD (coronary artery disease)      HTN (hypertension)      Stented coronary artery      S/P appendectomy      S/P       S/P coronary artery stent placement          MEDICATIONS  (STANDING):  aspirin enteric coated 81 milliGRAM(s) Oral daily  atorvastatin 40 milliGRAM(s) Oral at bedtime  heparin  Infusion 800 Unit(s)/Hr (8.5 mL/Hr) IV Continuous <Continuous>  metoprolol tartrate 12.5 milliGRAM(s) Oral two times a day    MEDICATIONS  (PRN):  acetaminophen     Tablet .. 650 milliGRAM(s) Oral every 6 hours PRN Temp greater or equal to 38C (100.4F), Mild Pain (1 - 3)  melatonin 3 milliGRAM(s) Oral at bedtime PRN Insomnia      Labs:                        11.6   6.47  )-----------( 184      ( 27 Dec 2023 16:36 )             34.0         142  |  108  |  17  ----------------------------<  108<H>  4.1   |  26  |  0.94    Ca    9.1      27 Dec 2023 16:37    TPro  6.2  /  Alb  4.0  /  TBili  0.5  /  DBili  x   /  AST  15  /  ALT  18  /  AlkPhos  79      PT/INR - ( 27 Dec 2023 16:36 )   PT: 11.1 sec;   INR: 1.01 ratio         PTT - ( 28 Dec 2023 01:12 )  PTT:57.7 sec    Blood Type: ABO Interpretation: B ( @ 16:44)    HGB A1C: 5.6  Pro-BNP: pending  Thyroid Panel:  @ 16:36/0.94  --/--/--    MRSA:  / MSSA: pending  Urinalysis Basic - ( 27 Dec 2023 17:32 )    Color: Yellow / Appearance: Clear / S.010 / pH: x  Gluc: x / Ketone: Negative mg/dL  / Bili: Negative / Urobili: 0.2 mg/dL   Blood: x / Protein: Negative mg/dL / Nitrite: Negative   Leuk Esterase: Trace / RBC: 0 /HPF / WBC 1 /HPF   Sq Epi: x / Non Sq Epi: 0 /HPF / Bacteria: Negative /HPF    CXR: < from: Xray Chest 1 View- PORTABLE-Urgent (Xray Chest 1 View- PORTABLE-Urgent .) (23 @ 17:03) >  Clear lungs.    EKG: SR    Carotid Duplex: < from: VA Duplex Carotid, Bilat (23 @ 20:02) >   No significant hemodynamic stenosis of either carotid artery.    PFT's: pending    Echocardiogram: < from: TTE W or WO Ultrasound Enhancing Agent (23 @ 12:02) >   1. Left ventricular cavity is normal. Left ventricular wall thickness is normal. Left ventricular systolic function is normal with an ejection fraction of 68 % by Fajardo's method of disks. There are no regional wall motion abnormalities seen.   2. Normal left ventricular diastolic function.   3. Normal right ventricular cavity size and systolic function.   4. Structurally normal mitral valve with normal leaflet excursion. There is trace mitral regurgitation.    Cardiac catheterization: < from: Cardiac Catheterization (23 @ 08:38) >  LM   Left main artery: IVUS of left main soft plaque 70% stemois area 4,0.  There is a 70 % stenosis in the proximal third portion of  the segment. Imaging shows plaque is soft and minimal cross sectional  area of 4.0 mm2.    LAD   Left anterior descending artery: just proximal to previous LAD stent.  There is a stenosis in the proximal third portion of the  segment. Mid left anterior descending: subtotally occluded at distal  end of stent. There is a stenosis in the distal third portion of  the segment.      CX   Circumflex: Angiography shows minor irregularities.      RCA   Right coronary artery: Angiography shows minor irregularities.      Ramus   Ramus intermedius: Angiography shows minor irregularities.  Intravascular ultrasound was performed.    Gen: WN/WD NAD  Neuro: AAOx3, nonfocal  Pulm: CTA B/L  CV: RRR, S1S2 +systolic murmur  Abd: Soft, NT, ND +BS  Ext: No edema, + peripheral pulses      Pt has AICD/PPM [ ] Yes  [x ] No             Brand Name:  Pre-op Beta Blocker ordered within 24 hrs of surgery (CABG ONLY)?  [x ] Yes  [ ] No  If not, Why?  Type & Cross  [x ] Yes  [ ] No  NPO after Midnight [x ] Yes  [ ] No  Pre-op ABX ordered, to be taped on chart:  [ x] Yes  [ ] No     Hibiclens/Peridex ordered [x ] Yes  [ ] No  Intraop on Hold: PRBCs, CXR, LARS [x ]   Consent obtained  [x ] Yes  [ ] No   Cardiac Surgery Pre-op Note:  CC: Patient is a 58y old  Female who presents with a chief complaint of CP (27 Dec 2023 16:24)                                                                                            Surgeon: Dr. Valdez  Procedure: (Date) (Procedure)  CABG    Allergies    No Known Allergies    Intolerances      HPI:  58 Fw/ pmh of htn, hld, cardiac stents-2, second hand smoke exposure presents today for elective cardiac catheterization at [facility] with . The patient c/o midsternal chest pain, aggravate with exertion (swimming and fast walking), 7/10, doesn't radiate, resolve with rest.  She denies SOB, palpitations, presyncope, syncope,  headache, visual disturbances, CVA, PE, DVT, MARIA INES, abdominal pain, N/V/D/C, hematochezia, melena, dysuria, hematuria, fever, chills. The patient was evaluated by a cardiologist, was found to have abnormal stress test.  Due to patient's symptoms and abnormal non invasive findings, the patient  was recommended to have cardiac catheterization. Stress test 23 There is a large-sized, severe defect in the anteropical and inferoapical walls consistent with infarct with severe moncho-infarct ischemia. There is a large sized, moderate to severe defect in the distal anteroseptal wall consistent with infarct with moderate to severe moncho-infarct ischemia; pt now s/p cath at [facility] which revealed + CAD; pt transferred to [facility] for OHS - CABG eval with Dr. Valdez. Pt denies cp/ sob at this time.         (27 Dec 2023 16:24)      PAST MEDICAL & SURGICAL HISTORY:  HLD (hyperlipidemia)      CAD (coronary artery disease)      HTN (hypertension)      Stented coronary artery      S/P appendectomy      S/P       S/P coronary artery stent placement          MEDICATIONS  (STANDING):  aspirin enteric coated 81 milliGRAM(s) Oral daily  atorvastatin 40 milliGRAM(s) Oral at bedtime  heparin  Infusion 800 Unit(s)/Hr (8.5 mL/Hr) IV Continuous <Continuous>  metoprolol tartrate 12.5 milliGRAM(s) Oral two times a day    MEDICATIONS  (PRN):  acetaminophen     Tablet .. 650 milliGRAM(s) Oral every 6 hours PRN Temp greater or equal to 38C (100.4F), Mild Pain (1 - 3)  melatonin 3 milliGRAM(s) Oral at bedtime PRN Insomnia      Labs:                        11.6   6.47  )-----------( 184      ( 27 Dec 2023 16:36 )             34.0         142  |  108  |  17  ----------------------------<  108<H>  4.1   |  26  |  0.94    Ca    9.1      27 Dec 2023 16:37    TPro  6.2  /  Alb  4.0  /  TBili  0.5  /  DBili  x   /  AST  15  /  ALT  18  /  AlkPhos  79      PT/INR - ( 27 Dec 2023 16:36 )   PT: 11.1 sec;   INR: 1.01 ratio         PTT - ( 28 Dec 2023 01:12 )  PTT:57.7 sec    Blood Type: ABO Interpretation: B ( @ 16:44)    HGB A1C: 5.6  Pro-BNP: pending  Thyroid Panel:  @ 16:36/0.94  --/--/--    MRSA:  / MSSA: pending  Urinalysis Basic - ( 27 Dec 2023 17:32 )    Color: Yellow / Appearance: Clear / S.010 / pH: x  Gluc: x / Ketone: Negative mg/dL  / Bili: Negative / Urobili: 0.2 mg/dL   Blood: x / Protein: Negative mg/dL / Nitrite: Negative   Leuk Esterase: Trace / RBC: 0 /HPF / WBC 1 /HPF   Sq Epi: x / Non Sq Epi: 0 /HPF / Bacteria: Negative /HPF    CXR: < from: Xray Chest 1 View- PORTABLE-Urgent (Xray Chest 1 View- PORTABLE-Urgent .) (23 @ 17:03) >  Clear lungs.    EKG: SR    Carotid Duplex: < from: VA Duplex Carotid, Bilat (23 @ 20:02) >   No significant hemodynamic stenosis of either carotid artery.    PFT's: pending    Echocardiogram: < from: TTE W or WO Ultrasound Enhancing Agent (23 @ 12:02) >   1. Left ventricular cavity is normal. Left ventricular wall thickness is normal. Left ventricular systolic function is normal with an ejection fraction of 68 % by Fajardo's method of disks. There are no regional wall motion abnormalities seen.   2. Normal left ventricular diastolic function.   3. Normal right ventricular cavity size and systolic function.   4. Structurally normal mitral valve with normal leaflet excursion. There is trace mitral regurgitation.    Cardiac catheterization: < from: Cardiac Catheterization (23 @ 08:38) >  LM   Left main artery: IVUS of left main soft plaque 70% stemois area 4,0.  There is a 70 % stenosis in the proximal third portion of  the segment. Imaging shows plaque is soft and minimal cross sectional  area of 4.0 mm2.    LAD   Left anterior descending artery: just proximal to previous LAD stent.  There is a stenosis in the proximal third portion of the  segment. Mid left anterior descending: subtotally occluded at distal  end of stent. There is a stenosis in the distal third portion of  the segment.      CX   Circumflex: Angiography shows minor irregularities.      RCA   Right coronary artery: Angiography shows minor irregularities.      Ramus   Ramus intermedius: Angiography shows minor irregularities.  Intravascular ultrasound was performed.    Gen: WN/WD NAD  Neuro: AAOx3, nonfocal  Pulm: CTA B/L  CV: RRR, S1S2 +systolic murmur  Abd: Soft, NT, ND +BS  Ext: No edema, + peripheral pulses      Pt has AICD/PPM [ ] Yes  [x ] No             Brand Name:  Pre-op Beta Blocker ordered within 24 hrs of surgery (CABG ONLY)?  [x ] Yes  [ ] No  If not, Why?  Type & Cross  [x ] Yes  [ ] No  NPO after Midnight [x ] Yes  [ ] No  Pre-op ABX ordered, to be taped on chart:  [ x] Yes  [ ] No     Hibiclens/Peridex ordered [x ] Yes  [ ] No  Intraop on Hold: PRBCs, CXR, LARS [x ]   Consent obtained  [x ] Yes  [ ] No   Cardiac Surgery Pre-op Note:  CC: Patient is a 58y old  Female who presents with a chief complaint of CP (27 Dec 2023 16:24)                                                                                            Surgeon: Dr. Valdez  Procedure: (Date) (Procedure)  CABG    Allergies    No Known Allergies    Intolerances      HPI:  58 Fw/ pmh of htn, hld, cardiac stents-2, second hand smoke exposure presents today for elective cardiac catheterization at [facility] with . The patient c/o midsternal chest pain, aggravate with exertion (swimming and fast walking), 7/10, doesn't radiate, resolve with rest.  She denies SOB, palpitations, presyncope, syncope,  headache, visual disturbances, CVA, PE, DVT, MARIA INES, abdominal pain, N/V/D/C, hematochezia, melena, dysuria, hematuria, fever, chills. The patient was evaluated by a cardiologist, was found to have abnormal stress test.  Due to patient's symptoms and abnormal non invasive findings, the patient  was recommended to have cardiac catheterization. Stress test 23 There is a large-sized, severe defect in the anteropical and inferoapical walls consistent with infarct with severe moncho-infarct ischemia. There is a large sized, moderate to severe defect in the distal anteroseptal wall consistent with infarct with moderate to severe moncho-infarct ischemia; pt now s/p cath at [facility] which revealed + CAD; pt transferred to [facility] for OHS - CABG eval with Dr. Valdez. Pt denies cp/ sob at this time.         (27 Dec 2023 16:24)      PAST MEDICAL & SURGICAL HISTORY:  HLD (hyperlipidemia)      CAD (coronary artery disease)      HTN (hypertension)      Stented coronary artery      S/P appendectomy      S/P       S/P coronary artery stent placement          MEDICATIONS  (STANDING):  aspirin enteric coated 81 milliGRAM(s) Oral daily  atorvastatin 40 milliGRAM(s) Oral at bedtime  heparin  Infusion 800 Unit(s)/Hr (8.5 mL/Hr) IV Continuous <Continuous>  metoprolol tartrate 12.5 milliGRAM(s) Oral two times a day    MEDICATIONS  (PRN):  acetaminophen     Tablet .. 650 milliGRAM(s) Oral every 6 hours PRN Temp greater or equal to 38C (100.4F), Mild Pain (1 - 3)  melatonin 3 milliGRAM(s) Oral at bedtime PRN Insomnia      Labs:                        11.6   6.47  )-----------( 184      ( 27 Dec 2023 16:36 )             34.0         142  |  108  |  17  ----------------------------<  108<H>  4.1   |  26  |  0.94    Ca    9.1      27 Dec 2023 16:37    TPro  6.2  /  Alb  4.0  /  TBili  0.5  /  DBili  x   /  AST  15  /  ALT  18  /  AlkPhos  79      PT/INR - ( 27 Dec 2023 16:36 )   PT: 11.1 sec;   INR: 1.01 ratio         PTT - ( 28 Dec 2023 01:12 )  PTT:57.7 sec    Blood Type: ABO Interpretation: B ( @ 16:44)    HGB A1C: 5.6  Pro-BNP: pending  Thyroid Panel:  @ 16:36/0.94  --/--/--    MRSA:  / MSSA: pending  Urinalysis Basic - ( 27 Dec 2023 17:32 )    Color: Yellow / Appearance: Clear / S.010 / pH: x  Gluc: x / Ketone: Negative mg/dL  / Bili: Negative / Urobili: 0.2 mg/dL   Blood: x / Protein: Negative mg/dL / Nitrite: Negative   Leuk Esterase: Trace / RBC: 0 /HPF / WBC 1 /HPF   Sq Epi: x / Non Sq Epi: 0 /HPF / Bacteria: Negative /HPF    CXR: < from: Xray Chest 1 View- PORTABLE-Urgent (Xray Chest 1 View- PORTABLE-Urgent .) (23 @ 17:03) >  Clear lungs.    EKG: SR    Carotid Duplex: < from: VA Duplex Carotid, Bilat (23 @ 20:02) >   No significant hemodynamic stenosis of either carotid artery.    PFT's: completed; result in the chart    Echocardiogram: < from: TTE W or WO Ultrasound Enhancing Agent (23 @ 12:02) >   1. Left ventricular cavity is normal. Left ventricular wall thickness is normal. Left ventricular systolic function is normal with an ejection fraction of 68 % by Fajardo's method of disks. There are no regional wall motion abnormalities seen.   2. Normal left ventricular diastolic function.   3. Normal right ventricular cavity size and systolic function.   4. Structurally normal mitral valve with normal leaflet excursion. There is trace mitral regurgitation.    Cardiac catheterization: < from: Cardiac Catheterization (23 @ 08:38) >  LM   Left main artery: IVUS of left main soft plaque 70% stemois area 4,0.  There is a 70 % stenosis in the proximal third portion of  the segment. Imaging shows plaque is soft and minimal cross sectional  area of 4.0 mm2.    LAD   Left anterior descending artery: just proximal to previous LAD stent.  There is a stenosis in the proximal third portion of the  segment. Mid left anterior descending: subtotally occluded at distal  end of stent. There is a stenosis in the distal third portion of  the segment.      CX   Circumflex: Angiography shows minor irregularities.      RCA   Right coronary artery: Angiography shows minor irregularities.      Ramus   Ramus intermedius: Angiography shows minor irregularities.  Intravascular ultrasound was performed.    Gen: WN/WD NAD  Neuro: AAOx3, nonfocal  Pulm: CTA B/L  CV: RRR, S1S2 +systolic murmur  Abd: Soft, NT, ND +BS  Ext: No edema, + peripheral pulses      Pt has AICD/PPM [ ] Yes  [x ] No             Brand Name:  Pre-op Beta Blocker ordered within 24 hrs of surgery (CABG ONLY)?  [x ] Yes  [ ] No  If not, Why?  Type & Cross  [x ] Yes  [ ] No  NPO after Midnight [x ] Yes  [ ] No  Pre-op ABX ordered, to be taped on chart:  [ x] Yes  [ ] No     Hibiclens/Peridex ordered [x ] Yes  [ ] No  Intraop on Hold: PRBCs, CXR, LARS [x ]   Consent obtained  [x ] Yes  [ ] No   Cardiac Surgery Pre-op Note:  CC: Patient is a 58y old  Female who presents with a chief complaint of CP (27 Dec 2023 16:24)                                                                                            Surgeon: Dr. Valdez  Procedure: (Date) (Procedure)  CABG    Allergies    No Known Allergies    Intolerances      HPI:  58 Fw/ pmh of htn, hld, cardiac stents-2, second hand smoke exposure presents today for elective cardiac catheterization at [facility] with . The patient c/o midsternal chest pain, aggravate with exertion (swimming and fast walking), 7/10, doesn't radiate, resolve with rest.  She denies SOB, palpitations, presyncope, syncope,  headache, visual disturbances, CVA, PE, DVT, MARIA INES, abdominal pain, N/V/D/C, hematochezia, melena, dysuria, hematuria, fever, chills. The patient was evaluated by a cardiologist, was found to have abnormal stress test.  Due to patient's symptoms and abnormal non invasive findings, the patient  was recommended to have cardiac catheterization. Stress test 23 There is a large-sized, severe defect in the anteropical and inferoapical walls consistent with infarct with severe moncho-infarct ischemia. There is a large sized, moderate to severe defect in the distal anteroseptal wall consistent with infarct with moderate to severe moncho-infarct ischemia; pt now s/p cath at [facility] which revealed + CAD; pt transferred to [facility] for OHS - CABG eval with Dr. Valdez. Pt denies cp/ sob at this time.         (27 Dec 2023 16:24)      PAST MEDICAL & SURGICAL HISTORY:  HLD (hyperlipidemia)      CAD (coronary artery disease)      HTN (hypertension)      Stented coronary artery      S/P appendectomy      S/P       S/P coronary artery stent placement          MEDICATIONS  (STANDING):  aspirin enteric coated 81 milliGRAM(s) Oral daily  atorvastatin 40 milliGRAM(s) Oral at bedtime  heparin  Infusion 800 Unit(s)/Hr (8.5 mL/Hr) IV Continuous <Continuous>  metoprolol tartrate 12.5 milliGRAM(s) Oral two times a day    MEDICATIONS  (PRN):  acetaminophen     Tablet .. 650 milliGRAM(s) Oral every 6 hours PRN Temp greater or equal to 38C (100.4F), Mild Pain (1 - 3)  melatonin 3 milliGRAM(s) Oral at bedtime PRN Insomnia      Labs:                        11.6   6.47  )-----------( 184      ( 27 Dec 2023 16:36 )             34.0         142  |  108  |  17  ----------------------------<  108<H>  4.1   |  26  |  0.94    Ca    9.1      27 Dec 2023 16:37    TPro  6.2  /  Alb  4.0  /  TBili  0.5  /  DBili  x   /  AST  15  /  ALT  18  /  AlkPhos  79      PT/INR - ( 27 Dec 2023 16:36 )   PT: 11.1 sec;   INR: 1.01 ratio         PTT - ( 28 Dec 2023 01:12 )  PTT:57.7 sec    Blood Type: ABO Interpretation: B ( @ 16:44)    HGB A1C: 5.6  Pro-BNP: pending  Thyroid Panel:  @ 16:36/0.94  --/--/--    MRSA:  / MSSA: pending  Urinalysis Basic - ( 27 Dec 2023 17:32 )    Color: Yellow / Appearance: Clear / S.010 / pH: x  Gluc: x / Ketone: Negative mg/dL  / Bili: Negative / Urobili: 0.2 mg/dL   Blood: x / Protein: Negative mg/dL / Nitrite: Negative   Leuk Esterase: Trace / RBC: 0 /HPF / WBC 1 /HPF   Sq Epi: x / Non Sq Epi: 0 /HPF / Bacteria: Negative /HPF    CXR: < from: Xray Chest 1 View- PORTABLE-Urgent (Xray Chest 1 View- PORTABLE-Urgent .) (23 @ 17:03) >  Clear lungs.    EKG: SR    Carotid Duplex: < from: VA Duplex Carotid, Bilat (23 @ 20:02) >   No significant hemodynamic stenosis of either carotid artery.    PFT's: completed; result in the chart    Echocardiogram: < from: TTE W or WO Ultrasound Enhancing Agent (23 @ 12:02) >   1. Left ventricular cavity is normal. Left ventricular wall thickness is normal. Left ventricular systolic function is normal with an ejection fraction of 68 % by Fajardo's method of disks. There are no regional wall motion abnormalities seen.   2. Normal left ventricular diastolic function.   3. Normal right ventricular cavity size and systolic function.   4. Structurally normal mitral valve with normal leaflet excursion. There is trace mitral regurgitation.    Cardiac catheterization: < from: Cardiac Catheterization (23 @ 08:38) >  LM   Left main artery: IVUS of left main soft plaque 70% stemois area 4,0.  There is a 70 % stenosis in the proximal third portion of  the segment. Imaging shows plaque is soft and minimal cross sectional  area of 4.0 mm2.    LAD   Left anterior descending artery: just proximal to previous LAD stent.  There is a stenosis in the proximal third portion of the  segment. Mid left anterior descending: subtotally occluded at distal  end of stent. There is a stenosis in the distal third portion of  the segment.      CX   Circumflex: Angiography shows minor irregularities.      RCA   Right coronary artery: Angiography shows minor irregularities.      Ramus   Ramus intermedius: Angiography shows minor irregularities.  Intravascular ultrasound was performed.    Gen: WN/WD NAD  Neuro: AAOx3, nonfocal  Pulm: CTA B/L  CV: RRR, S1S2 +systolic murmur  Abd: Soft, NT, ND +BS  Ext: No edema, + peripheral pulses      Pt has AICD/PPM [ ] Yes  [x ] No             Brand Name:  Pre-op Beta Blocker ordered within 24 hrs of surgery (CABG ONLY)?  [x ] Yes  [ ] No  If not, Why?  Type & Cross  [x ] Yes  [ ] No  NPO after Midnight [x ] Yes  [ ] No  Pre-op ABX ordered, to be taped on chart:  [ x] Yes  [ ] No     Hibiclens/Peridex ordered [x ] Yes  [ ] No  Intraop on Hold: PRBCs, CXR, LARS [x ]   Consent obtained  [x ] Yes  [ ] No   Cardiac Surgery Pre-op Note:  CC: Patient is a 58y old  Female who presents with a chief complaint of CP (27 Dec 2023 16:24)                                                                                            Surgeon: Dr. Valdez  Procedure: (Date) (Procedure)  CABG    Allergies    No Known Allergies    Intolerances      HPI:  58 Fw/ pmh of htn, hld, cardiac stents-2, second hand smoke exposure presents today for elective cardiac catheterization at [facility] with . The patient c/o midsternal chest pain, aggravate with exertion (swimming and fast walking), 7/10, doesn't radiate, resolve with rest.  She denies SOB, palpitations, presyncope, syncope,  headache, visual disturbances, CVA, PE, DVT, MARIA INES, abdominal pain, N/V/D/C, hematochezia, melena, dysuria, hematuria, fever, chills. The patient was evaluated by a cardiologist, was found to have abnormal stress test.  Due to patient's symptoms and abnormal non invasive findings, the patient  was recommended to have cardiac catheterization. Stress test 23 There is a large-sized, severe defect in the anteropical and inferoapical walls consistent with infarct with severe moncho-infarct ischemia. There is a large sized, moderate to severe defect in the distal anteroseptal wall consistent with infarct with moderate to severe moncho-infarct ischemia; pt now s/p cath at [facility] which revealed + CAD; pt transferred to [facility] for OHS - CABG eval with Dr. Valdez. Pt denies cp/ sob at this time.         (27 Dec 2023 16:24)      PAST MEDICAL & SURGICAL HISTORY:  HLD (hyperlipidemia)      CAD (coronary artery disease)      HTN (hypertension)      Stented coronary artery      S/P appendectomy      S/P       S/P coronary artery stent placement          MEDICATIONS  (STANDING):  aspirin enteric coated 81 milliGRAM(s) Oral daily  atorvastatin 40 milliGRAM(s) Oral at bedtime  heparin  Infusion 800 Unit(s)/Hr (8.5 mL/Hr) IV Continuous <Continuous>  metoprolol tartrate 12.5 milliGRAM(s) Oral two times a day    MEDICATIONS  (PRN):  acetaminophen     Tablet .. 650 milliGRAM(s) Oral every 6 hours PRN Temp greater or equal to 38C (100.4F), Mild Pain (1 - 3)  melatonin 3 milliGRAM(s) Oral at bedtime PRN Insomnia      Labs:                        11.6   6.47  )-----------( 184      ( 27 Dec 2023 16:36 )             34.0         142  |  108  |  17  ----------------------------<  108<H>  4.1   |  26  |  0.94    Ca    9.1      27 Dec 2023 16:37    TPro  6.2  /  Alb  4.0  /  TBili  0.5  /  DBili  x   /  AST  15  /  ALT  18  /  AlkPhos  79      PT/INR - ( 27 Dec 2023 16:36 )   PT: 11.1 sec;   INR: 1.01 ratio         PTT - ( 28 Dec 2023 01:12 )  PTT:57.7 sec    Blood Type: ABO Interpretation: B ( @ 16:44)    HGB A1C: 5.6  Pro-BNP: pending  Thyroid Panel:  @ 16:36/0.94  --/--/--    MRSA:  / MSSA:   MRSA PCR Result.: NotDetec  Staph aureus PCR Result: Detected (23 @ 16:35)   Urinalysis Basic - ( 27 Dec 2023 17:32 )    Color: Yellow / Appearance: Clear / S.010 / pH: x  Gluc: x / Ketone: Negative mg/dL  / Bili: Negative / Urobili: 0.2 mg/dL   Blood: x / Protein: Negative mg/dL / Nitrite: Negative   Leuk Esterase: Trace / RBC: 0 /HPF / WBC 1 /HPF   Sq Epi: x / Non Sq Epi: 0 /HPF / Bacteria: Negative /HPF    CXR: < from: Xray Chest 1 View- PORTABLE-Urgent (Xray Chest 1 View- PORTABLE-Urgent .) (23 @ 17:03) >  Clear lungs.    EKG: SR    Carotid Duplex: < from: VA Duplex Carotid, Bilat (23 @ 20:02) >   No significant hemodynamic stenosis of either carotid artery.    PFT's: completed; result in the chart    Echocardiogram: < from: TTE W or WO Ultrasound Enhancing Agent (23 @ 12:02) >   1. Left ventricular cavity is normal. Left ventricular wall thickness is normal. Left ventricular systolic function is normal with an ejection fraction of 68 % by Fajardo's method of disks. There are no regional wall motion abnormalities seen.   2. Normal left ventricular diastolic function.   3. Normal right ventricular cavity size and systolic function.   4. Structurally normal mitral valve with normal leaflet excursion. There is trace mitral regurgitation.    Cardiac catheterization: < from: Cardiac Catheterization (23 @ 08:38) >  LM   Left main artery: IVUS of left main soft plaque 70% stemois area 4,0.  There is a 70 % stenosis in the proximal third portion of  the segment. Imaging shows plaque is soft and minimal cross sectional  area of 4.0 mm2.    LAD   Left anterior descending artery: just proximal to previous LAD stent.  There is a stenosis in the proximal third portion of the  segment. Mid left anterior descending: subtotally occluded at distal  end of stent. There is a stenosis in the distal third portion of  the segment.      CX   Circumflex: Angiography shows minor irregularities.      RCA   Right coronary artery: Angiography shows minor irregularities.      Ramus   Ramus intermedius: Angiography shows minor irregularities.  Intravascular ultrasound was performed.    Gen: WN/WD NAD  Neuro: AAOx3, nonfocal  Pulm: CTA B/L  CV: RRR, S1S2 +systolic murmur  Abd: Soft, NT, ND +BS  Ext: No edema, + peripheral pulses      Pt has AICD/PPM [ ] Yes  [x ] No             Brand Name:  Pre-op Beta Blocker ordered within 24 hrs of surgery (CABG ONLY)?  [x ] Yes  [ ] No  If not, Why?  Type & Cross  [x ] Yes  [ ] No  NPO after Midnight [x ] Yes  [ ] No  Pre-op ABX ordered, to be taped on chart:  [ x] Yes  [ ] No     Hibiclens/Peridex ordered [x ] Yes  [ ] No  Intraop on Hold: PRBCs, CXR, LARS [x ]   Consent obtained  [x ] Yes  [ ] No   Cardiac Surgery Pre-op Note:  CC: Patient is a 58y old  Female who presents with a chief complaint of CP (27 Dec 2023 16:24)                                                                                            Surgeon: Dr. Valdez  Procedure: (Date) (Procedure)  CABG    Allergies    No Known Allergies    Intolerances      HPI:  58 Fw/ pmh of htn, hld, cardiac stents-2, second hand smoke exposure presents today for elective cardiac catheterization at [facility] with . The patient c/o midsternal chest pain, aggravate with exertion (swimming and fast walking), 7/10, doesn't radiate, resolve with rest.  She denies SOB, palpitations, presyncope, syncope,  headache, visual disturbances, CVA, PE, DVT, MARIA INES, abdominal pain, N/V/D/C, hematochezia, melena, dysuria, hematuria, fever, chills. The patient was evaluated by a cardiologist, was found to have abnormal stress test.  Due to patient's symptoms and abnormal non invasive findings, the patient  was recommended to have cardiac catheterization. Stress test 23 There is a large-sized, severe defect in the anteropical and inferoapical walls consistent with infarct with severe moncho-infarct ischemia. There is a large sized, moderate to severe defect in the distal anteroseptal wall consistent with infarct with moderate to severe moncho-infarct ischemia; pt now s/p cath at [facility] which revealed + CAD; pt transferred to [facility] for OHS - CABG eval with Dr. Valdez. Pt denies cp/ sob at this time.         (27 Dec 2023 16:24)      PAST MEDICAL & SURGICAL HISTORY:  HLD (hyperlipidemia)      CAD (coronary artery disease)      HTN (hypertension)      Stented coronary artery      S/P appendectomy      S/P       S/P coronary artery stent placement          MEDICATIONS  (STANDING):  aspirin enteric coated 81 milliGRAM(s) Oral daily  atorvastatin 40 milliGRAM(s) Oral at bedtime  heparin  Infusion 800 Unit(s)/Hr (8.5 mL/Hr) IV Continuous <Continuous>  metoprolol tartrate 12.5 milliGRAM(s) Oral two times a day    MEDICATIONS  (PRN):  acetaminophen     Tablet .. 650 milliGRAM(s) Oral every 6 hours PRN Temp greater or equal to 38C (100.4F), Mild Pain (1 - 3)  melatonin 3 milliGRAM(s) Oral at bedtime PRN Insomnia      Labs:                        11.6   6.47  )-----------( 184      ( 27 Dec 2023 16:36 )             34.0         142  |  108  |  17  ----------------------------<  108<H>  4.1   |  26  |  0.94    Ca    9.1      27 Dec 2023 16:37    TPro  6.2  /  Alb  4.0  /  TBili  0.5  /  DBili  x   /  AST  15  /  ALT  18  /  AlkPhos  79      PT/INR - ( 27 Dec 2023 16:36 )   PT: 11.1 sec;   INR: 1.01 ratio         PTT - ( 28 Dec 2023 01:12 )  PTT:57.7 sec    Blood Type: ABO Interpretation: B ( @ 16:44)    HGB A1C: 5.6  Pro-BNP: pending  Thyroid Panel:  @ 16:36/0.94  --/--/--    MRSA:  / MSSA:   MRSA PCR Result.: NotDetec  Staph aureus PCR Result: Detected (23 @ 16:35)   Urinalysis Basic - ( 27 Dec 2023 17:32 )    Color: Yellow / Appearance: Clear / S.010 / pH: x  Gluc: x / Ketone: Negative mg/dL  / Bili: Negative / Urobili: 0.2 mg/dL   Blood: x / Protein: Negative mg/dL / Nitrite: Negative   Leuk Esterase: Trace / RBC: 0 /HPF / WBC 1 /HPF   Sq Epi: x / Non Sq Epi: 0 /HPF / Bacteria: Negative /HPF    CXR: < from: Xray Chest 1 View- PORTABLE-Urgent (Xray Chest 1 View- PORTABLE-Urgent .) (23 @ 17:03) >  Clear lungs.    EKG: SR    Carotid Duplex: < from: VA Duplex Carotid, Bilat (23 @ 20:02) >   No significant hemodynamic stenosis of either carotid artery.    PFT's: completed; result in the chart    Echocardiogram: < from: TTE W or WO Ultrasound Enhancing Agent (23 @ 12:02) >   1. Left ventricular cavity is normal. Left ventricular wall thickness is normal. Left ventricular systolic function is normal with an ejection fraction of 68 % by Fajardo's method of disks. There are no regional wall motion abnormalities seen.   2. Normal left ventricular diastolic function.   3. Normal right ventricular cavity size and systolic function.   4. Structurally normal mitral valve with normal leaflet excursion. There is trace mitral regurgitation.    Cardiac catheterization: < from: Cardiac Catheterization (23 @ 08:38) >  LM   Left main artery: IVUS of left main soft plaque 70% stemois area 4,0.  There is a 70 % stenosis in the proximal third portion of  the segment. Imaging shows plaque is soft and minimal cross sectional  area of 4.0 mm2.    LAD   Left anterior descending artery: just proximal to previous LAD stent.  There is a stenosis in the proximal third portion of the  segment. Mid left anterior descending: subtotally occluded at distal  end of stent. There is a stenosis in the distal third portion of  the segment.      CX   Circumflex: Angiography shows minor irregularities.      RCA   Right coronary artery: Angiography shows minor irregularities.      Ramus   Ramus intermedius: Angiography shows minor irregularities.  Intravascular ultrasound was performed.    Gen: WN/WD NAD  Neuro: AAOx3, nonfocal  Pulm: CTA B/L  CV: RRR, S1S2 +systolic murmur  Abd: Soft, NT, ND +BS  Ext: No edema, + peripheral pulses      Pt has AICD/PPM [ ] Yes  [x ] No             Brand Name:  Pre-op Beta Blocker ordered within 24 hrs of surgery (CABG ONLY)?  [x ] Yes  [ ] No  If not, Why?  Type & Cross  [x ] Yes  [ ] No  NPO after Midnight [x ] Yes  [ ] No  Pre-op ABX ordered, to be taped on chart:  [ x] Yes  [ ] No     Hibiclens/Peridex ordered [x ] Yes  [ ] No  Intraop on Hold: PRBCs, CXR, LARS [x ]   Consent obtained  [x ] Yes  [ ] No

## 2023-12-28 NOTE — PRE-ANESTHESIA EVALUATION ADULT - NSANTHOSAYNRD_GEN_A_CORE
No. MARIA INES screening performed.  STOP BANG Legend: 0-2 = LOW Risk; 3-4 = INTERMEDIATE Risk; 5-8 = HIGH Risk

## 2023-12-28 NOTE — PROGRESS NOTE ADULT - ASSESSMENT
58 Fw/ pmh of htn, hld, cardiac stents-2, second hand smoke exposure presents today for elective cardiac catheterization at [facility] with . The patient c/o midsternal chest pain, aggravate with exertion (swimming and fast walking), 7/10, doesn't radiate, resolve with rest.  She denies SOB, palpitations, presyncope, syncope,  headache, visual disturbances, CVA, PE, DVT, MARIA INES, abdominal pain, N/V/D/C, hematochezia, melena, dysuria, hematuria, fever, chills. The patient was evaluated by a cardiologist, was found to have abnormal stress test.  Due to patient's symptoms and abnormal non invasive findings, the patient  was recommended to have cardiac catheterization. Stress test 12/23/23 There is a large-sized, severe defect in the anteropical and inferoapical walls consistent with infarct with severe moncho-infarct ischemia. There is a large sized, moderate to severe defect in the distal anteroseptal wall consistent with infarct with moderate to severe moncho-infarct ischemia; pt now s/p cath at [facility] which revealed + CAD; pt transferred to [facility] for OHS - CABG eval with Dr. Valdez. Pt denies cp/ sob at this time.     12/28 - VSS. Pre-op work up in progress. Preserve left arm. P2y12 167. Remain on heparin gtt. Plan for CABG on Friday with Dr. Valdez. 58 Fw/ pmh of htn, hld, cardiac stents-2, second hand smoke exposure presents today for elective cardiac catheterization at [facility] with . The patient c/o midsternal chest pain, aggravate with exertion (swimming and fast walking), 7/10, doesn't radiate, resolve with rest.  She denies SOB, palpitations, presyncope, syncope,  headache, visual disturbances, CVA, PE, DVT, MARIA INES, abdominal pain, N/V/D/C, hematochezia, melena, dysuria, hematuria, fever, chills. The patient was evaluated by a cardiologist, was found to have abnormal stress test.  Due to patient's symptoms and abnormal non invasive findings, the patient  was recommended to have cardiac catheterization. Stress test 12/23/23 There is a large-sized, severe defect in the anteropical and inferoapical walls consistent with infarct with severe moncho-infarct ischemia. There is a large sized, moderate to severe defect in the distal anteroseptal wall consistent with infarct with moderate to severe moncho-infarct ischemia; pt now s/p cath at [facility] which revealed + CAD; pt transferred to [facility] for OHS - CABG eval with Dr. Valdez. Pt denies cp/ sob at this time.     12/28 - VSS. Pre-op work up in progress. Preserve left arm. P2y12 167. Remain on heparin gtt. Plan for CABG on Friday with Dr. Valdez. 58 Fw/ pmh of htn, hld, cardiac stents-2, second hand smoke exposure presents today for elective cardiac catheterization at [facility] with . The patient c/o midsternal chest pain, aggravate with exertion (swimming and fast walking), 7/10, doesn't radiate, resolve with rest.  She denies SOB, palpitations, presyncope, syncope,  headache, visual disturbances, CVA, PE, DVT, MARIA INES, abdominal pain, N/V/D/C, hematochezia, melena, dysuria, hematuria, fever, chills. The patient was evaluated by a cardiologist, was found to have abnormal stress test.  Due to patient's symptoms and abnormal non invasive findings, the patient  was recommended to have cardiac catheterization. Stress test 12/23/23 There is a large-sized, severe defect in the anteropical and inferoapical walls consistent with infarct with severe moncho-infarct ischemia. There is a large sized, moderate to severe defect in the distal anteroseptal wall consistent with infarct with moderate to severe moncho-infarct ischemia; pt now s/p cath at [facility] which revealed + CAD; pt transferred to [facility] for OHS - CABG eval with Dr. Valdez. Pt denies cp/ sob at this time.     12/28 - VSS. Pre-op work up in progress. Preserve left arm. P2y12 167. Remain on heparin gtt and turn off at midnight. Plan for CABG on Friday with Dr. Valdez. 58 Fw/ pmh of htn, hld, cardiac stents-2, second hand smoke exposure presents today for elective cardiac catheterization at [facility] with . The patient c/o midsternal chest pain, aggravate with exertion (swimming and fast walking), 7/10, doesn't radiate, resolve with rest.  She denies SOB, palpitations, presyncope, syncope,  headache, visual disturbances, CVA, PE, DVT, MARIA INES, abdominal pain, N/V/D/C, hematochezia, melena, dysuria, hematuria, fever, chills. The patient was evaluated by a cardiologist, was found to have abnormal stress test.  Due to patient's symptoms and abnormal non invasive findings, the patient  was recommended to have cardiac catheterization. Stress test 12/23/23 There is a large-sized, severe defect in the anteropical and inferoapical walls consistent with infarct with severe moncho-infarct ischemia. There is a large sized, moderate to severe defect in the distal anteroseptal wall consistent with infarct with moderate to severe moncho-infarct ischemia; pt now s/p cath at [facility] which revealed + CAD; pt transferred to [facility] for OHS - CABG eval with Dr. Valdez. Pt denies cp/ sob at this time.     12/28 - VSS. Pre-op work up in progress. Preserve left arm. P2y12 167. Remain on heparin gtt and turn off at midnight. Plan for CABG on Friday with Dr. Valdez.

## 2023-12-28 NOTE — PROGRESS NOTE ADULT - PROBLEM SELECTOR PLAN 1
CABG Friday  NPO after midnight  Hibiclens shower  type and cross  abx on call to OR  Turn off heparin gtt at 4am  Lopressor 12.5mg BID  Statin 40mg hs  Asa 81mg daily  PFT CABG Friday  NPO after midnight  Hibiclens shower  type and cross  abx on call to OR  Turn off heparin gtt at midnight  Lopressor 12.5mg BID  Statin 40mg hs  Asa 81mg daily

## 2023-12-28 NOTE — PROGRESS NOTE ADULT - SUBJECTIVE AND OBJECTIVE BOX
Subjective: Pt states "I am ok" denies any CP, SOB, N/V and palpitations. No acute events overnight.     Telemetry: SB-SR 44-90 (50)  Vital Signs Last 24 Hrs  T(C): 36.9 (12-28-23 @ 04:22), Max: 36.9 (12-28-23 @ 04:22)  T(F): 98.4 (12-28-23 @ 04:22), Max: 98.4 (12-28-23 @ 04:22)  HR: 53 (12-28-23 @ 04:22) (52 - 62)  BP: 99/66 (12-28-23 @ 04:22) (98/64 - 115/75)  RR: 18 (12-28-23 @ 04:22) (18 - 18)  SpO2: 99% (12-28-23 @ 04:22) (97% - 99%)            12-27 @ 07:01  -  12-28 @ 07:00  --------------------------------------------------------  IN: 793.5 mL / OUT: 2150 mL / NET: -1356.5 mL       Daily Height in cm: 152.4 (27 Dec 2023 15:28)    Daily     Drug Dosing Weight  Height (cm): 152.4 (27 Dec 2023 15:28)  Weight (kg): 59.3 (27 Dec 2023 15:28)  BMI (kg/m2): 25.5 (27 Dec 2023 15:28)  BSA (m2): 1.56 (27 Dec 2023 15:28)    Bilirubin Total: 0.5 mg/dL (12-27 @ 16:37)    CAPILLARY BLOOD GLUCOSE              ALLERGIES  Allergies    No Known Allergies    Intolerances        MEDICATIONS  acetaminophen     Tablet .. 650 milliGRAM(s) Oral every 6 hours PRN  aspirin enteric coated 81 milliGRAM(s) Oral daily  atorvastatin 40 milliGRAM(s) Oral at bedtime  heparin  Infusion 800 Unit(s)/Hr IV Continuous <Continuous>  melatonin 3 milliGRAM(s) Oral at bedtime PRN  metoprolol tartrate 12.5 milliGRAM(s) Oral two times a day      PHYSICAL EXAM    Neurology: A&Ox3, nonfocal, no gross deficits  CV: rhythm - SB, HR - ; RRR+S1S2  Sternal Wound:  MSI _____ dressing CDI , Stable  Lungs: Respirations non-labored, B/L BS clear/ diminished  Abdomen: Soft, NT/ND, +BSx4Q, last BM on  (-)N/V/D; Tolerating well with current diet  : Voiding without difficulty, bladder non-distended, Ellis in place [  ]  Extremities: ZHU, equal strength throughout the extremities, + peripheral edema to bilateral lower extremities, bilateral negative calf tenderness, +PP,   Skin: L/R SVG incision CDI & CESILIA  Lines: AVF, Permcath, midline, PICC    Pacing wires:   Chest tubes:  Drains:    LABS  12-27    142  |  108  |  17  ----------------------------<  108<H>  4.1   |  26  |  0.94    Ca    9.1      27 Dec 2023 16:37    TPro  6.2  /  Alb  4.0  /  TBili  0.5  /  DBili  x   /  AST  15  /  ALT  18  /  AlkPhos  79  12-27                                 12.1   6.18  )-----------( 198      ( 28 Dec 2023 09:06 )             35.5          PT/INR - ( 27 Dec 2023 16:36 )   PT: 11.1 sec;   INR: 1.01 ratio         PTT - ( 28 Dec 2023 09:03 )  PTT:88.8 sec    Physical Therapy Rec:   Home  [  ]   Home w/ PT  [  ]  Rehab  [  ]    Discussed with CT Surgery attending.     Subjective: Pt states "I am ok" denies any CP, SOB, N/V and palpitations. No acute events overnight.     Telemetry: SB-SR 44-90 (50)  Vital Signs Last 24 Hrs  T(C): 36.9 (12-28-23 @ 04:22), Max: 36.9 (12-28-23 @ 04:22)  T(F): 98.4 (12-28-23 @ 04:22), Max: 98.4 (12-28-23 @ 04:22)  HR: 53 (12-28-23 @ 04:22) (52 - 62)  BP: 99/66 (12-28-23 @ 04:22) (98/64 - 115/75)  RR: 18 (12-28-23 @ 04:22) (18 - 18)  SpO2: 99% (12-28-23 @ 04:22) (97% - 99%)            12-27 @ 07:01  -  12-28 @ 07:00  --------------------------------------------------------  IN: 793.5 mL / OUT: 2150 mL / NET: -1356.5 mL       Daily Height in cm: 152.4 (27 Dec 2023 15:28)    Daily     Drug Dosing Weight  Height (cm): 152.4 (27 Dec 2023 15:28)  Weight (kg): 59.3 (27 Dec 2023 15:28)  BMI (kg/m2): 25.5 (27 Dec 2023 15:28)  BSA (m2): 1.56 (27 Dec 2023 15:28)    Bilirubin Total: 0.5 mg/dL (12-27 @ 16:37)    CAPILLARY BLOOD GLUCOSE              ALLERGIES  Allergies    No Known Allergies    Intolerances        MEDICATIONS  acetaminophen     Tablet .. 650 milliGRAM(s) Oral every 6 hours PRN  aspirin enteric coated 81 milliGRAM(s) Oral daily  atorvastatin 40 milliGRAM(s) Oral at bedtime  heparin  Infusion 800 Unit(s)/Hr IV Continuous <Continuous>  melatonin 3 milliGRAM(s) Oral at bedtime PRN  metoprolol tartrate 12.5 milliGRAM(s) Oral two times a day      PHYSICAL EXAM    Neurology: A&Ox3, nonfocal, no gross deficits  CV: rhythm - SB, HR 44-90 ; RRR+S1S2  Lungs: Respirations non-labored, B/L BS clear  Abdomen: Soft, NT/ND, +BSx4Q, last BM on 12/27 (-)N/V/D  : Voiding without difficulty, bladder non-distended  Extremities: ZHU, equal strength throughout the extremities, no peripheral edema, ilateral negative calf tenderness, +2PP.   Skin intact    LABS  12-27    142  |  108  |  17  ----------------------------<  108<H>  4.1   |  26  |  0.94    Ca    9.1      27 Dec 2023 16:37    TPro  6.2  /  Alb  4.0  /  TBili  0.5  /  DBili  x   /  AST  15  /  ALT  18  /  AlkPhos  79  12-27                                 12.1   6.18  )-----------( 198      ( 28 Dec 2023 09:06 )             35.5          PT/INR - ( 27 Dec 2023 16:36 )   PT: 11.1 sec;   INR: 1.01 ratio         PTT - ( 28 Dec 2023 09:03 )  PTT:88.8 sec    Discussed with CT Surgery attending Dr. Valdez   Subjective: Pt states "I am ok" denies any CP, SOB, N/V and palpitations. No acute events overnight.     Telemetry: SB-SR 44-90 (50)  Vital Signs Last 24 Hrs  T(C): 36.9 (12-28-23 @ 04:22), Max: 36.9 (12-28-23 @ 04:22)  T(F): 98.4 (12-28-23 @ 04:22), Max: 98.4 (12-28-23 @ 04:22)  HR: 53 (12-28-23 @ 04:22) (52 - 62)  BP: 99/66 (12-28-23 @ 04:22) (98/64 - 115/75)  RR: 18 (12-28-23 @ 04:22) (18 - 18)  SpO2: 99% (12-28-23 @ 04:22) (97% - 99%)            12-27 @ 07:01  -  12-28 @ 07:00  --------------------------------------------------------  IN: 793.5 mL / OUT: 2150 mL / NET: -1356.5 mL       Daily Height in cm: 152.4 (27 Dec 2023 15:28)    Daily     Drug Dosing Weight  Height (cm): 152.4 (27 Dec 2023 15:28)  Weight (kg): 59.3 (27 Dec 2023 15:28)  BMI (kg/m2): 25.5 (27 Dec 2023 15:28)  BSA (m2): 1.56 (27 Dec 2023 15:28)    Bilirubin Total: 0.5 mg/dL (12-27 @ 16:37)    CAPILLARY BLOOD GLUCOSE              ALLERGIES  Allergies    No Known Allergies    Intolerances        MEDICATIONS  acetaminophen     Tablet .. 650 milliGRAM(s) Oral every 6 hours PRN  aspirin enteric coated 81 milliGRAM(s) Oral daily  atorvastatin 40 milliGRAM(s) Oral at bedtime  heparin  Infusion 800 Unit(s)/Hr IV Continuous <Continuous>  melatonin 3 milliGRAM(s) Oral at bedtime PRN  metoprolol tartrate 12.5 milliGRAM(s) Oral two times a day      PHYSICAL EXAM    Neurology: A&Ox3, nonfocal, no gross deficits  CV: rhythm - SB, HR 44-90 ; RRR+S1S2  Lungs: Respirations non-labored, B/L BS clear  Abdomen: Soft, NT/ND, +BSx4Q, last BM on 12/27 (-)N/V/D  : Voiding without difficulty, bladder non-distended  Extremities: HZU, equal strength throughout the extremities, no peripheral edema, ilateral negative calf tenderness, +2PP.   Skin intact    LABS  12-27    142  |  108  |  17  ----------------------------<  108<H>  4.1   |  26  |  0.94    Ca    9.1      27 Dec 2023 16:37    TPro  6.2  /  Alb  4.0  /  TBili  0.5  /  DBili  x   /  AST  15  /  ALT  18  /  AlkPhos  79  12-27                                 12.1   6.18  )-----------( 198      ( 28 Dec 2023 09:06 )             35.5          PT/INR - ( 27 Dec 2023 16:36 )   PT: 11.1 sec;   INR: 1.01 ratio         PTT - ( 28 Dec 2023 09:03 )  PTT:88.8 sec    Discussed with CT Surgery attending Dr. Valdez   Subjective: Pt states "I am ok" denies any CP, SOB, N/V and palpitations. No acute events overnight.     Telemetry: SB-SR 44-90 (50)  Vital Signs Last 24 Hrs  T(C): 36.9 (12-28-23 @ 04:22), Max: 36.9 (12-28-23 @ 04:22)  T(F): 98.4 (12-28-23 @ 04:22), Max: 98.4 (12-28-23 @ 04:22)  HR: 53 (12-28-23 @ 04:22) (52 - 62)  BP: 99/66 (12-28-23 @ 04:22) (98/64 - 115/75)  RR: 18 (12-28-23 @ 04:22) (18 - 18)  SpO2: 99% (12-28-23 @ 04:22) (97% - 99%)            12-27 @ 07:01  -  12-28 @ 07:00  --------------------------------------------------------  IN: 793.5 mL / OUT: 2150 mL / NET: -1356.5 mL       Daily Height in cm: 152.4 (27 Dec 2023 15:28)    Daily     Drug Dosing Weight  Height (cm): 152.4 (27 Dec 2023 15:28)  Weight (kg): 59.3 (27 Dec 2023 15:28)  BMI (kg/m2): 25.5 (27 Dec 2023 15:28)  BSA (m2): 1.56 (27 Dec 2023 15:28)    Bilirubin Total: 0.5 mg/dL (12-27 @ 16:37)    CAPILLARY BLOOD GLUCOSE              ALLERGIES  Allergies    No Known Allergies    Intolerances        MEDICATIONS  acetaminophen     Tablet .. 650 milliGRAM(s) Oral every 6 hours PRN  aspirin enteric coated 81 milliGRAM(s) Oral daily  atorvastatin 40 milliGRAM(s) Oral at bedtime  heparin  Infusion 800 Unit(s)/Hr IV Continuous <Continuous>  melatonin 3 milliGRAM(s) Oral at bedtime PRN  metoprolol tartrate 12.5 milliGRAM(s) Oral two times a day      PHYSICAL EXAM    Neurology: A&Ox3, nonfocal, no gross deficits  CV: rhythm - SB/SR, HR 44-90 (50); RRR+S1S2  Lungs: Respirations non-labored, B/L BS clear  Abdomen: Soft, NT/ND, +BSx4Q, last BM on 12/27 (-)N/V/D  : Voiding without difficulty, bladder non-distended  Extremities: ZHU, equal strength throughout the extremities, no peripheral edema, ilateral negative calf tenderness, +2PP.   Skin intact    LABS  12-27    142  |  108  |  17  ----------------------------<  108<H>  4.1   |  26  |  0.94    Ca    9.1      27 Dec 2023 16:37    TPro  6.2  /  Alb  4.0  /  TBili  0.5  /  DBili  x   /  AST  15  /  ALT  18  /  AlkPhos  79  12-27                                 12.1   6.18  )-----------( 198      ( 28 Dec 2023 09:06 )             35.5          PT/INR - ( 27 Dec 2023 16:36 )   PT: 11.1 sec;   INR: 1.01 ratio         PTT - ( 28 Dec 2023 09:03 )  PTT:88.8 sec    Discussed with CT Surgery attending Dr. Valdez

## 2023-12-29 ENCOUNTER — TRANSCRIPTION ENCOUNTER (OUTPATIENT)
Age: 58
End: 2023-12-29

## 2023-12-29 LAB
ALBUMIN SERPL ELPH-MCNC: 3.3 G/DL — SIGNIFICANT CHANGE UP (ref 3.3–5)
ALBUMIN SERPL ELPH-MCNC: 3.3 G/DL — SIGNIFICANT CHANGE UP (ref 3.3–5)
ALP SERPL-CCNC: 46 U/L — SIGNIFICANT CHANGE UP (ref 40–120)
ALP SERPL-CCNC: 46 U/L — SIGNIFICANT CHANGE UP (ref 40–120)
ALT FLD-CCNC: 17 U/L — SIGNIFICANT CHANGE UP (ref 10–45)
ALT FLD-CCNC: 17 U/L — SIGNIFICANT CHANGE UP (ref 10–45)
ANION GAP SERPL CALC-SCNC: 20 MMOL/L — HIGH (ref 5–17)
ANION GAP SERPL CALC-SCNC: 20 MMOL/L — HIGH (ref 5–17)
APTT BLD: 29.7 SEC — SIGNIFICANT CHANGE UP (ref 24.5–35.6)
APTT BLD: 29.7 SEC — SIGNIFICANT CHANGE UP (ref 24.5–35.6)
AST SERPL-CCNC: 25 U/L — SIGNIFICANT CHANGE UP (ref 10–40)
AST SERPL-CCNC: 25 U/L — SIGNIFICANT CHANGE UP (ref 10–40)
BASE EXCESS BLDV CALC-SCNC: -1.9 MMOL/L — SIGNIFICANT CHANGE UP (ref -2–3)
BASE EXCESS BLDV CALC-SCNC: -1.9 MMOL/L — SIGNIFICANT CHANGE UP (ref -2–3)
BASE EXCESS BLDV CALC-SCNC: 2 MMOL/L — SIGNIFICANT CHANGE UP (ref -2–3)
BASE EXCESS BLDV CALC-SCNC: 2 MMOL/L — SIGNIFICANT CHANGE UP (ref -2–3)
BASOPHILS # BLD AUTO: 0.03 K/UL — SIGNIFICANT CHANGE UP (ref 0–0.2)
BASOPHILS # BLD AUTO: 0.03 K/UL — SIGNIFICANT CHANGE UP (ref 0–0.2)
BASOPHILS NFR BLD AUTO: 0.3 % — SIGNIFICANT CHANGE UP (ref 0–2)
BASOPHILS NFR BLD AUTO: 0.3 % — SIGNIFICANT CHANGE UP (ref 0–2)
BILIRUB SERPL-MCNC: 1.1 MG/DL — SIGNIFICANT CHANGE UP (ref 0.2–1.2)
BILIRUB SERPL-MCNC: 1.1 MG/DL — SIGNIFICANT CHANGE UP (ref 0.2–1.2)
BUN SERPL-MCNC: 12 MG/DL — SIGNIFICANT CHANGE UP (ref 7–23)
BUN SERPL-MCNC: 12 MG/DL — SIGNIFICANT CHANGE UP (ref 7–23)
CA-I SERPL-SCNC: 0.72 MMOL/L — LOW (ref 1.15–1.33)
CA-I SERPL-SCNC: 0.72 MMOL/L — LOW (ref 1.15–1.33)
CA-I SERPL-SCNC: 0.82 MMOL/L — LOW (ref 1.15–1.33)
CA-I SERPL-SCNC: 0.82 MMOL/L — LOW (ref 1.15–1.33)
CALCIUM SERPL-MCNC: 9.1 MG/DL — SIGNIFICANT CHANGE UP (ref 8.4–10.5)
CALCIUM SERPL-MCNC: 9.1 MG/DL — SIGNIFICANT CHANGE UP (ref 8.4–10.5)
CHLORIDE BLDV-SCNC: 101 MMOL/L — SIGNIFICANT CHANGE UP (ref 96–108)
CHLORIDE BLDV-SCNC: 101 MMOL/L — SIGNIFICANT CHANGE UP (ref 96–108)
CHLORIDE BLDV-SCNC: 104 MMOL/L — SIGNIFICANT CHANGE UP (ref 96–108)
CHLORIDE BLDV-SCNC: 104 MMOL/L — SIGNIFICANT CHANGE UP (ref 96–108)
CHLORIDE SERPL-SCNC: 104 MMOL/L — SIGNIFICANT CHANGE UP (ref 96–108)
CHLORIDE SERPL-SCNC: 104 MMOL/L — SIGNIFICANT CHANGE UP (ref 96–108)
CK MB BLD-MCNC: 5.9 % — HIGH (ref 0–3.5)
CK MB BLD-MCNC: 5.9 % — HIGH (ref 0–3.5)
CK MB CFR SERPL CALC: 14.4 NG/ML — HIGH (ref 0–3.8)
CK MB CFR SERPL CALC: 14.4 NG/ML — HIGH (ref 0–3.8)
CK SERPL-CCNC: 243 U/L — HIGH (ref 25–170)
CK SERPL-CCNC: 243 U/L — HIGH (ref 25–170)
CO2 BLDV-SCNC: 24 MMOL/L — SIGNIFICANT CHANGE UP (ref 22–26)
CO2 BLDV-SCNC: 24 MMOL/L — SIGNIFICANT CHANGE UP (ref 22–26)
CO2 BLDV-SCNC: 29 MMOL/L — HIGH (ref 22–26)
CO2 BLDV-SCNC: 29 MMOL/L — HIGH (ref 22–26)
CO2 SERPL-SCNC: 22 MMOL/L — SIGNIFICANT CHANGE UP (ref 22–31)
CO2 SERPL-SCNC: 22 MMOL/L — SIGNIFICANT CHANGE UP (ref 22–31)
CREAT SERPL-MCNC: 0.76 MG/DL — SIGNIFICANT CHANGE UP (ref 0.5–1.3)
CREAT SERPL-MCNC: 0.76 MG/DL — SIGNIFICANT CHANGE UP (ref 0.5–1.3)
EGFR: 91 ML/MIN/1.73M2 — SIGNIFICANT CHANGE UP
EGFR: 91 ML/MIN/1.73M2 — SIGNIFICANT CHANGE UP
EOSINOPHIL # BLD AUTO: 0 K/UL — SIGNIFICANT CHANGE UP (ref 0–0.5)
EOSINOPHIL # BLD AUTO: 0 K/UL — SIGNIFICANT CHANGE UP (ref 0–0.5)
EOSINOPHIL NFR BLD AUTO: 0 % — SIGNIFICANT CHANGE UP (ref 0–6)
EOSINOPHIL NFR BLD AUTO: 0 % — SIGNIFICANT CHANGE UP (ref 0–6)
FIBRINOGEN PPP-MCNC: 297 MG/DL — SIGNIFICANT CHANGE UP (ref 200–445)
FIBRINOGEN PPP-MCNC: 297 MG/DL — SIGNIFICANT CHANGE UP (ref 200–445)
GAS PNL BLDA: SIGNIFICANT CHANGE UP
GAS PNL BLDV: 137 MMOL/L — SIGNIFICANT CHANGE UP (ref 136–145)
GAS PNL BLDV: SIGNIFICANT CHANGE UP
GLUCOSE BLDC GLUCOMTR-MCNC: 103 MG/DL — HIGH (ref 70–99)
GLUCOSE BLDC GLUCOMTR-MCNC: 103 MG/DL — HIGH (ref 70–99)
GLUCOSE BLDC GLUCOMTR-MCNC: 106 MG/DL — HIGH (ref 70–99)
GLUCOSE BLDC GLUCOMTR-MCNC: 106 MG/DL — HIGH (ref 70–99)
GLUCOSE BLDC GLUCOMTR-MCNC: 109 MG/DL — HIGH (ref 70–99)
GLUCOSE BLDC GLUCOMTR-MCNC: 109 MG/DL — HIGH (ref 70–99)
GLUCOSE BLDC GLUCOMTR-MCNC: 111 MG/DL — HIGH (ref 70–99)
GLUCOSE BLDC GLUCOMTR-MCNC: 111 MG/DL — HIGH (ref 70–99)
GLUCOSE BLDC GLUCOMTR-MCNC: 116 MG/DL — HIGH (ref 70–99)
GLUCOSE BLDC GLUCOMTR-MCNC: 116 MG/DL — HIGH (ref 70–99)
GLUCOSE BLDC GLUCOMTR-MCNC: 126 MG/DL — HIGH (ref 70–99)
GLUCOSE BLDC GLUCOMTR-MCNC: 126 MG/DL — HIGH (ref 70–99)
GLUCOSE BLDC GLUCOMTR-MCNC: 138 MG/DL — HIGH (ref 70–99)
GLUCOSE BLDC GLUCOMTR-MCNC: 138 MG/DL — HIGH (ref 70–99)
GLUCOSE BLDC GLUCOMTR-MCNC: 183 MG/DL — HIGH (ref 70–99)
GLUCOSE BLDC GLUCOMTR-MCNC: 183 MG/DL — HIGH (ref 70–99)
GLUCOSE BLDC GLUCOMTR-MCNC: 240 MG/DL — HIGH (ref 70–99)
GLUCOSE BLDC GLUCOMTR-MCNC: 240 MG/DL — HIGH (ref 70–99)
GLUCOSE BLDV-MCNC: 111 MG/DL — HIGH (ref 70–99)
GLUCOSE BLDV-MCNC: 111 MG/DL — HIGH (ref 70–99)
GLUCOSE BLDV-MCNC: 130 MG/DL — HIGH (ref 70–99)
GLUCOSE BLDV-MCNC: 130 MG/DL — HIGH (ref 70–99)
GLUCOSE SERPL-MCNC: 227 MG/DL — HIGH (ref 70–99)
GLUCOSE SERPL-MCNC: 227 MG/DL — HIGH (ref 70–99)
HCO3 BLDV-SCNC: 23 MMOL/L — SIGNIFICANT CHANGE UP (ref 22–29)
HCO3 BLDV-SCNC: 23 MMOL/L — SIGNIFICANT CHANGE UP (ref 22–29)
HCO3 BLDV-SCNC: 27 MMOL/L — SIGNIFICANT CHANGE UP (ref 22–29)
HCO3 BLDV-SCNC: 27 MMOL/L — SIGNIFICANT CHANGE UP (ref 22–29)
HCT VFR BLD CALC: 30.9 % — LOW (ref 34.5–45)
HCT VFR BLD CALC: 30.9 % — LOW (ref 34.5–45)
HCT VFR BLDA CALC: 17 % — CRITICAL LOW (ref 34.5–46.5)
HCT VFR BLDA CALC: 17 % — CRITICAL LOW (ref 34.5–46.5)
HCT VFR BLDA CALC: 21 % — CRITICAL LOW (ref 34.5–46.5)
HCT VFR BLDA CALC: 21 % — CRITICAL LOW (ref 34.5–46.5)
HEPARINASE TEG R TIME: 7.5 MIN — SIGNIFICANT CHANGE UP (ref 4.3–8.3)
HEPARINASE TEG R TIME: 7.5 MIN — SIGNIFICANT CHANGE UP (ref 4.3–8.3)
HGB BLD CALC-MCNC: 5.8 G/DL — CRITICAL LOW (ref 11.7–16.1)
HGB BLD CALC-MCNC: 5.8 G/DL — CRITICAL LOW (ref 11.7–16.1)
HGB BLD CALC-MCNC: 7.1 G/DL — LOW (ref 11.7–16.1)
HGB BLD CALC-MCNC: 7.1 G/DL — LOW (ref 11.7–16.1)
HGB BLD-MCNC: 10.9 G/DL — LOW (ref 11.5–15.5)
HGB BLD-MCNC: 10.9 G/DL — LOW (ref 11.5–15.5)
IMM GRANULOCYTES NFR BLD AUTO: 0.7 % — SIGNIFICANT CHANGE UP (ref 0–0.9)
IMM GRANULOCYTES NFR BLD AUTO: 0.7 % — SIGNIFICANT CHANGE UP (ref 0–0.9)
INR BLD: 1.18 RATIO — SIGNIFICANT CHANGE UP (ref 0.85–1.18)
INR BLD: 1.18 RATIO — SIGNIFICANT CHANGE UP (ref 0.85–1.18)
LACTATE BLDV-MCNC: 0.9 MMOL/L — SIGNIFICANT CHANGE UP (ref 0.5–2)
LACTATE BLDV-MCNC: 0.9 MMOL/L — SIGNIFICANT CHANGE UP (ref 0.5–2)
LACTATE BLDV-MCNC: 1.5 MMOL/L — SIGNIFICANT CHANGE UP (ref 0.5–2)
LACTATE BLDV-MCNC: 1.5 MMOL/L — SIGNIFICANT CHANGE UP (ref 0.5–2)
LYMPHOCYTES # BLD AUTO: 0.64 K/UL — LOW (ref 1–3.3)
LYMPHOCYTES # BLD AUTO: 0.64 K/UL — LOW (ref 1–3.3)
LYMPHOCYTES # BLD AUTO: 6.1 % — LOW (ref 13–44)
LYMPHOCYTES # BLD AUTO: 6.1 % — LOW (ref 13–44)
MAGNESIUM SERPL-MCNC: 2.4 MG/DL — SIGNIFICANT CHANGE UP (ref 1.6–2.6)
MAGNESIUM SERPL-MCNC: 2.4 MG/DL — SIGNIFICANT CHANGE UP (ref 1.6–2.6)
MCHC RBC-ENTMCNC: 30.5 PG — SIGNIFICANT CHANGE UP (ref 27–34)
MCHC RBC-ENTMCNC: 30.5 PG — SIGNIFICANT CHANGE UP (ref 27–34)
MCHC RBC-ENTMCNC: 35.3 GM/DL — SIGNIFICANT CHANGE UP (ref 32–36)
MCHC RBC-ENTMCNC: 35.3 GM/DL — SIGNIFICANT CHANGE UP (ref 32–36)
MCV RBC AUTO: 86.6 FL — SIGNIFICANT CHANGE UP (ref 80–100)
MCV RBC AUTO: 86.6 FL — SIGNIFICANT CHANGE UP (ref 80–100)
MONOCYTES # BLD AUTO: 0.17 K/UL — SIGNIFICANT CHANGE UP (ref 0–0.9)
MONOCYTES # BLD AUTO: 0.17 K/UL — SIGNIFICANT CHANGE UP (ref 0–0.9)
MONOCYTES NFR BLD AUTO: 1.6 % — LOW (ref 2–14)
MONOCYTES NFR BLD AUTO: 1.6 % — LOW (ref 2–14)
NEUTROPHILS # BLD AUTO: 9.52 K/UL — HIGH (ref 1.8–7.4)
NEUTROPHILS # BLD AUTO: 9.52 K/UL — HIGH (ref 1.8–7.4)
NEUTROPHILS NFR BLD AUTO: 91.3 % — HIGH (ref 43–77)
NEUTROPHILS NFR BLD AUTO: 91.3 % — HIGH (ref 43–77)
NRBC # BLD: 0 /100 WBCS — SIGNIFICANT CHANGE UP (ref 0–0)
NRBC # BLD: 0 /100 WBCS — SIGNIFICANT CHANGE UP (ref 0–0)
OTHER CELLS CSF MANUAL: 8.2 ML/DL — LOW (ref 18–22)
OTHER CELLS CSF MANUAL: 8.2 ML/DL — LOW (ref 18–22)
PCO2 BLDV: 39 MMHG — SIGNIFICANT CHANGE UP (ref 39–42)
PCO2 BLDV: 39 MMHG — SIGNIFICANT CHANGE UP (ref 39–42)
PCO2 BLDV: 45 MMHG — HIGH (ref 39–42)
PCO2 BLDV: 45 MMHG — HIGH (ref 39–42)
PH BLDV: 7.38 — SIGNIFICANT CHANGE UP (ref 7.32–7.43)
PH BLDV: 7.38 — SIGNIFICANT CHANGE UP (ref 7.32–7.43)
PH BLDV: 7.39 — SIGNIFICANT CHANGE UP (ref 7.32–7.43)
PH BLDV: 7.39 — SIGNIFICANT CHANGE UP (ref 7.32–7.43)
PHOSPHATE SERPL-MCNC: 3.9 MG/DL — SIGNIFICANT CHANGE UP (ref 2.5–4.5)
PHOSPHATE SERPL-MCNC: 3.9 MG/DL — SIGNIFICANT CHANGE UP (ref 2.5–4.5)
PLATELET # BLD AUTO: 220 K/UL — SIGNIFICANT CHANGE UP (ref 150–400)
PLATELET # BLD AUTO: 220 K/UL — SIGNIFICANT CHANGE UP (ref 150–400)
PO2 BLDV: 66 MMHG — HIGH (ref 25–45)
PO2 BLDV: 66 MMHG — HIGH (ref 25–45)
PO2 BLDV: 86 MMHG — HIGH (ref 25–45)
PO2 BLDV: 86 MMHG — HIGH (ref 25–45)
POTASSIUM BLDV-SCNC: 5.1 MMOL/L — SIGNIFICANT CHANGE UP (ref 3.5–5.1)
POTASSIUM BLDV-SCNC: 5.1 MMOL/L — SIGNIFICANT CHANGE UP (ref 3.5–5.1)
POTASSIUM BLDV-SCNC: 5.3 MMOL/L — HIGH (ref 3.5–5.1)
POTASSIUM BLDV-SCNC: 5.3 MMOL/L — HIGH (ref 3.5–5.1)
POTASSIUM SERPL-MCNC: 3.8 MMOL/L — SIGNIFICANT CHANGE UP (ref 3.5–5.3)
POTASSIUM SERPL-MCNC: 3.8 MMOL/L — SIGNIFICANT CHANGE UP (ref 3.5–5.3)
POTASSIUM SERPL-SCNC: 3.8 MMOL/L — SIGNIFICANT CHANGE UP (ref 3.5–5.3)
POTASSIUM SERPL-SCNC: 3.8 MMOL/L — SIGNIFICANT CHANGE UP (ref 3.5–5.3)
PROT SERPL-MCNC: 5 G/DL — LOW (ref 6–8.3)
PROT SERPL-MCNC: 5 G/DL — LOW (ref 6–8.3)
PROTHROM AB SERPL-ACNC: 12.9 SEC — SIGNIFICANT CHANGE UP (ref 9.5–13)
PROTHROM AB SERPL-ACNC: 12.9 SEC — SIGNIFICANT CHANGE UP (ref 9.5–13)
RAPIDTEG MAXIMUM AMPLITUDE: 65.1 MM — SIGNIFICANT CHANGE UP (ref 52–70)
RAPIDTEG MAXIMUM AMPLITUDE: 65.1 MM — SIGNIFICANT CHANGE UP (ref 52–70)
RBC # BLD: 3.57 M/UL — LOW (ref 3.8–5.2)
RBC # BLD: 3.57 M/UL — LOW (ref 3.8–5.2)
RBC # FLD: 13.2 % — SIGNIFICANT CHANGE UP (ref 10.3–14.5)
RBC # FLD: 13.2 % — SIGNIFICANT CHANGE UP (ref 10.3–14.5)
SAO2 % BLDV: 96.3 % — HIGH (ref 67–88)
SAO2 % BLDV: 96.3 % — HIGH (ref 67–88)
SAO2 % BLDV: 99.3 % — HIGH (ref 67–88)
SAO2 % BLDV: 99.3 % — HIGH (ref 67–88)
SODIUM SERPL-SCNC: 146 MMOL/L — HIGH (ref 135–145)
SODIUM SERPL-SCNC: 146 MMOL/L — HIGH (ref 135–145)
TEG FUNCTIONAL FIBRINOGEN: 22.4 MM — SIGNIFICANT CHANGE UP (ref 15–32)
TEG FUNCTIONAL FIBRINOGEN: 22.4 MM — SIGNIFICANT CHANGE UP (ref 15–32)
TEG MAXIMUM AMPLITUDE: 65 MM — SIGNIFICANT CHANGE UP (ref 52–69)
TEG MAXIMUM AMPLITUDE: 65 MM — SIGNIFICANT CHANGE UP (ref 52–69)
TEG REACTION TIME: 6.9 MIN — SIGNIFICANT CHANGE UP (ref 4.6–9.1)
TEG REACTION TIME: 6.9 MIN — SIGNIFICANT CHANGE UP (ref 4.6–9.1)
TROPONIN T, HIGH SENSITIVITY RESULT: 229 NG/L — HIGH (ref 0–51)
TROPONIN T, HIGH SENSITIVITY RESULT: 229 NG/L — HIGH (ref 0–51)
WBC # BLD: 10.43 K/UL — SIGNIFICANT CHANGE UP (ref 3.8–10.5)
WBC # BLD: 10.43 K/UL — SIGNIFICANT CHANGE UP (ref 3.8–10.5)
WBC # FLD AUTO: 10.43 K/UL — SIGNIFICANT CHANGE UP (ref 3.8–10.5)
WBC # FLD AUTO: 10.43 K/UL — SIGNIFICANT CHANGE UP (ref 3.8–10.5)

## 2023-12-29 PROCEDURE — 33534 CABG ARTERIAL TWO: CPT | Mod: AS

## 2023-12-29 PROCEDURE — 33534 CABG ARTERIAL TWO: CPT

## 2023-12-29 PROCEDURE — 99291 CRITICAL CARE FIRST HOUR: CPT

## 2023-12-29 PROCEDURE — 71045 X-RAY EXAM CHEST 1 VIEW: CPT | Mod: 26,76

## 2023-12-29 PROCEDURE — 33509 NDSC HRV UXTR ART 1 SGM CAB: CPT | Mod: LT

## 2023-12-29 DEVICE — VISTASEAL FIBRIN HUMAN 10ML: Type: IMPLANTABLE DEVICE | Status: FUNCTIONAL

## 2023-12-29 DEVICE — PACING WIRE WHITE M-22 LOOP 89MM: Type: IMPLANTABLE DEVICE | Status: FUNCTIONAL

## 2023-12-29 DEVICE — CANNULA ARTERIAL OPTISITE 20FR X 3/8" VENTED: Type: IMPLANTABLE DEVICE | Status: FUNCTIONAL

## 2023-12-29 DEVICE — CANNULA IMA 1MM BLUNT TIP: Type: IMPLANTABLE DEVICE | Status: FUNCTIONAL

## 2023-12-29 DEVICE — CHEST DRAIN THORACIC ARGYLE PVC 28FR STRAIGHT: Type: IMPLANTABLE DEVICE | Status: FUNCTIONAL

## 2023-12-29 DEVICE — CHEST DRAIN THORACIC ARGYLE PVC 28FR RIGHT ANGLE: Type: IMPLANTABLE DEVICE | Status: FUNCTIONAL

## 2023-12-29 DEVICE — CANNULA ANTEGRADE W/VENT 12GA: Type: IMPLANTABLE DEVICE | Status: FUNCTIONAL

## 2023-12-29 DEVICE — KIT A-LINE 1LUM 20G X 12CM SAFE KIT: Type: IMPLANTABLE DEVICE | Status: FUNCTIONAL

## 2023-12-29 DEVICE — TACHOSIL 9.5 X 4.8CM: Type: IMPLANTABLE DEVICE | Status: FUNCTIONAL

## 2023-12-29 DEVICE — INTRODUCER SET MICROPUNCTURE ACCESS 21G X 7CM: Type: IMPLANTABLE DEVICE | Status: FUNCTIONAL

## 2023-12-29 DEVICE — CATH VENT VENTRICULAR PVC 18FR X 4.25" TIP PERFORATION: Type: IMPLANTABLE DEVICE | Status: FUNCTIONAL

## 2023-12-29 DEVICE — LIGATING CLIPS WECK HORIZON MEDIUM (BLUE) 24: Type: IMPLANTABLE DEVICE | Status: FUNCTIONAL

## 2023-12-29 DEVICE — SHEATH INTRODUCER TERUMO PINNACLE CORONARY 5FR X 10CM X 0.038" MINI WIRE: Type: IMPLANTABLE DEVICE | Status: FUNCTIONAL

## 2023-12-29 DEVICE — LIGATING CLIPS WECK HORIZON SMALL-WIDE (RED) 24: Type: IMPLANTABLE DEVICE | Status: FUNCTIONAL

## 2023-12-29 DEVICE — KIT CVC 2LUM MAC 9FR CHG: Type: IMPLANTABLE DEVICE | Status: FUNCTIONAL

## 2023-12-29 DEVICE — CANNULA VENOUS 2 STAGE THIN FLEX 36/46FR X 1/2" WITH RETURN DIAL: Type: IMPLANTABLE DEVICE | Status: FUNCTIONAL

## 2023-12-29 RX ORDER — CHLORHEXIDINE GLUCONATE 213 G/1000ML
1 SOLUTION TOPICAL DAILY
Refills: 0 | Status: COMPLETED | OUTPATIENT
Start: 2023-12-29 | End: 2024-01-03

## 2023-12-29 RX ORDER — ALBUMIN HUMAN 25 %
250 VIAL (ML) INTRAVENOUS ONCE
Refills: 0 | Status: COMPLETED | OUTPATIENT
Start: 2023-12-29 | End: 2023-12-29

## 2023-12-29 RX ORDER — ASPIRIN/CALCIUM CARB/MAGNESIUM 324 MG
300 TABLET ORAL ONCE
Refills: 0 | Status: DISCONTINUED | OUTPATIENT
Start: 2023-12-29 | End: 2023-12-29

## 2023-12-29 RX ORDER — POTASSIUM CHLORIDE 20 MEQ
10 PACKET (EA) ORAL
Refills: 0 | Status: DISCONTINUED | OUTPATIENT
Start: 2023-12-29 | End: 2023-12-31

## 2023-12-29 RX ORDER — OXYCODONE HYDROCHLORIDE 5 MG/1
5 TABLET ORAL EVERY 4 HOURS
Refills: 0 | Status: DISCONTINUED | OUTPATIENT
Start: 2023-12-29 | End: 2023-12-30

## 2023-12-29 RX ORDER — NOREPINEPHRINE BITARTRATE/D5W 8 MG/250ML
0.05 PLASTIC BAG, INJECTION (ML) INTRAVENOUS
Qty: 8 | Refills: 0 | Status: DISCONTINUED | OUTPATIENT
Start: 2023-12-29 | End: 2023-12-29

## 2023-12-29 RX ORDER — CHLORHEXIDINE GLUCONATE 213 G/1000ML
5 SOLUTION TOPICAL
Refills: 0 | Status: DISCONTINUED | OUTPATIENT
Start: 2023-12-29 | End: 2023-12-30

## 2023-12-29 RX ORDER — SODIUM CHLORIDE 9 MG/ML
500 INJECTION, SOLUTION INTRAVENOUS ONCE
Refills: 0 | Status: COMPLETED | OUTPATIENT
Start: 2023-12-29 | End: 2023-12-29

## 2023-12-29 RX ORDER — ASCORBIC ACID 60 MG
500 TABLET,CHEWABLE ORAL
Refills: 0 | Status: COMPLETED | OUTPATIENT
Start: 2023-12-29 | End: 2024-01-03

## 2023-12-29 RX ORDER — MUPIROCIN 20 MG/G
1 OINTMENT TOPICAL EVERY 12 HOURS
Refills: 0 | Status: COMPLETED | OUTPATIENT
Start: 2023-12-29 | End: 2024-01-03

## 2023-12-29 RX ORDER — SENNA PLUS 8.6 MG/1
2 TABLET ORAL AT BEDTIME
Refills: 0 | Status: DISCONTINUED | OUTPATIENT
Start: 2023-12-30 | End: 2024-01-03

## 2023-12-29 RX ORDER — CEFUROXIME AXETIL 250 MG
1500 TABLET ORAL EVERY 8 HOURS
Refills: 0 | Status: COMPLETED | OUTPATIENT
Start: 2023-12-29 | End: 2023-12-31

## 2023-12-29 RX ORDER — OXYCODONE HYDROCHLORIDE 5 MG/1
10 TABLET ORAL EVERY 4 HOURS
Refills: 0 | Status: DISCONTINUED | OUTPATIENT
Start: 2023-12-29 | End: 2023-12-30

## 2023-12-29 RX ORDER — ACETAMINOPHEN 500 MG
650 TABLET ORAL EVERY 6 HOURS
Refills: 0 | Status: COMPLETED | OUTPATIENT
Start: 2023-12-29 | End: 2024-01-01

## 2023-12-29 RX ORDER — ASPIRIN/CALCIUM CARB/MAGNESIUM 324 MG
81 TABLET ORAL DAILY
Refills: 0 | Status: DISCONTINUED | OUTPATIENT
Start: 2023-12-29 | End: 2024-01-03

## 2023-12-29 RX ORDER — PANTOPRAZOLE SODIUM 20 MG/1
40 TABLET, DELAYED RELEASE ORAL DAILY
Refills: 0 | Status: DISCONTINUED | OUTPATIENT
Start: 2023-12-29 | End: 2023-12-30

## 2023-12-29 RX ORDER — CHLORHEXIDINE GLUCONATE 213 G/1000ML
1 SOLUTION TOPICAL DAILY
Refills: 0 | Status: DISCONTINUED | OUTPATIENT
Start: 2023-12-29 | End: 2024-01-03

## 2023-12-29 RX ORDER — POLYETHYLENE GLYCOL 3350 17 G/17G
17 POWDER, FOR SOLUTION ORAL DAILY
Refills: 0 | Status: DISCONTINUED | OUTPATIENT
Start: 2023-12-30 | End: 2024-01-03

## 2023-12-29 RX ORDER — POTASSIUM CHLORIDE 20 MEQ
10 PACKET (EA) ORAL
Refills: 0 | Status: COMPLETED | OUTPATIENT
Start: 2023-12-29 | End: 2023-12-29

## 2023-12-29 RX ORDER — DEXMEDETOMIDINE HYDROCHLORIDE IN 0.9% SODIUM CHLORIDE 4 UG/ML
0.5 INJECTION INTRAVENOUS
Qty: 200 | Refills: 0 | Status: DISCONTINUED | OUTPATIENT
Start: 2023-12-29 | End: 2023-12-29

## 2023-12-29 RX ORDER — ONDANSETRON 8 MG/1
4 TABLET, FILM COATED ORAL EVERY 6 HOURS
Refills: 0 | Status: DISCONTINUED | OUTPATIENT
Start: 2023-12-29 | End: 2024-01-03

## 2023-12-29 RX ORDER — SODIUM CHLORIDE 9 MG/ML
1000 INJECTION INTRAMUSCULAR; INTRAVENOUS; SUBCUTANEOUS
Refills: 0 | Status: DISCONTINUED | OUTPATIENT
Start: 2023-12-29 | End: 2023-12-31

## 2023-12-29 RX ORDER — ATORVASTATIN CALCIUM 80 MG/1
80 TABLET, FILM COATED ORAL AT BEDTIME
Refills: 0 | Status: DISCONTINUED | OUTPATIENT
Start: 2023-12-29 | End: 2024-01-03

## 2023-12-29 RX ORDER — HYDROMORPHONE HYDROCHLORIDE 2 MG/ML
0.5 INJECTION INTRAMUSCULAR; INTRAVENOUS; SUBCUTANEOUS EVERY 6 HOURS
Refills: 0 | Status: DISCONTINUED | OUTPATIENT
Start: 2023-12-29 | End: 2023-12-30

## 2023-12-29 RX ORDER — ONDANSETRON 8 MG/1
4 TABLET, FILM COATED ORAL ONCE
Refills: 0 | Status: COMPLETED | OUTPATIENT
Start: 2023-12-29 | End: 2023-12-29

## 2023-12-29 RX ORDER — ACETAMINOPHEN 500 MG
650 TABLET ORAL EVERY 6 HOURS
Refills: 0 | Status: DISCONTINUED | OUTPATIENT
Start: 2024-01-01 | End: 2024-01-03

## 2023-12-29 RX ORDER — GABAPENTIN 400 MG/1
100 CAPSULE ORAL EVERY 8 HOURS
Refills: 0 | Status: DISCONTINUED | OUTPATIENT
Start: 2023-12-29 | End: 2023-12-29

## 2023-12-29 RX ORDER — MEPERIDINE HYDROCHLORIDE 50 MG/ML
25 INJECTION INTRAMUSCULAR; INTRAVENOUS; SUBCUTANEOUS ONCE
Refills: 0 | Status: DISCONTINUED | OUTPATIENT
Start: 2023-12-29 | End: 2023-12-29

## 2023-12-29 RX ORDER — INSULIN HUMAN 100 [IU]/ML
3 INJECTION, SOLUTION SUBCUTANEOUS
Qty: 100 | Refills: 0 | Status: DISCONTINUED | OUTPATIENT
Start: 2023-12-29 | End: 2023-12-30

## 2023-12-29 RX ORDER — DEXTROSE 50 % IN WATER 50 %
25 SYRINGE (ML) INTRAVENOUS
Refills: 0 | Status: DISCONTINUED | OUTPATIENT
Start: 2023-12-29 | End: 2023-12-29

## 2023-12-29 RX ORDER — DEXTROSE 50 % IN WATER 50 %
50 SYRINGE (ML) INTRAVENOUS
Refills: 0 | Status: DISCONTINUED | OUTPATIENT
Start: 2023-12-29 | End: 2023-12-31

## 2023-12-29 RX ORDER — METOCLOPRAMIDE HCL 10 MG
10 TABLET ORAL EVERY 8 HOURS
Refills: 0 | Status: COMPLETED | OUTPATIENT
Start: 2023-12-29 | End: 2023-12-31

## 2023-12-29 RX ORDER — AMIODARONE HYDROCHLORIDE 400 MG/1
400 TABLET ORAL
Refills: 0 | Status: DISCONTINUED | OUTPATIENT
Start: 2023-12-29 | End: 2023-12-30

## 2023-12-29 RX ORDER — CHLORHEXIDINE GLUCONATE 213 G/1000ML
15 SOLUTION TOPICAL EVERY 12 HOURS
Refills: 0 | Status: DISCONTINUED | OUTPATIENT
Start: 2023-12-29 | End: 2023-12-29

## 2023-12-29 RX ADMIN — Medication 1000 MILLIGRAM(S): at 06:00

## 2023-12-29 RX ADMIN — CHLORHEXIDINE GLUCONATE 5 MILLILITER(S): 213 SOLUTION TOPICAL at 17:27

## 2023-12-29 RX ADMIN — HYDROMORPHONE HYDROCHLORIDE 0.5 MILLIGRAM(S): 2 INJECTION INTRAMUSCULAR; INTRAVENOUS; SUBCUTANEOUS at 23:09

## 2023-12-29 RX ADMIN — SODIUM CHLORIDE 2000 MILLILITER(S): 9 INJECTION, SOLUTION INTRAVENOUS at 17:25

## 2023-12-29 RX ADMIN — Medication 100 MILLIGRAM(S): at 19:05

## 2023-12-29 RX ADMIN — Medication 650 MILLIGRAM(S): at 23:09

## 2023-12-29 RX ADMIN — ATORVASTATIN CALCIUM 80 MILLIGRAM(S): 80 TABLET, FILM COATED ORAL at 22:16

## 2023-12-29 RX ADMIN — PANTOPRAZOLE SODIUM 40 MILLIGRAM(S): 20 TABLET, DELAYED RELEASE ORAL at 17:27

## 2023-12-29 RX ADMIN — Medication 125 MILLILITER(S): at 14:26

## 2023-12-29 RX ADMIN — Medication 50 MILLIEQUIVALENT(S): at 16:40

## 2023-12-29 RX ADMIN — Medication 5.55 MICROGRAM(S)/KG/MIN: at 17:24

## 2023-12-29 RX ADMIN — Medication 50 MILLIEQUIVALENT(S): at 15:09

## 2023-12-29 RX ADMIN — GABAPENTIN 300 MILLIGRAM(S): 400 CAPSULE ORAL at 05:59

## 2023-12-29 RX ADMIN — SODIUM CHLORIDE 500 MILLILITER(S): 9 INJECTION, SOLUTION INTRAVENOUS at 13:45

## 2023-12-29 RX ADMIN — Medication 12.5 MILLIGRAM(S): at 05:59

## 2023-12-29 RX ADMIN — CHLORHEXIDINE GLUCONATE 1 APPLICATION(S): 213 SOLUTION TOPICAL at 06:07

## 2023-12-29 RX ADMIN — MUPIROCIN 1 APPLICATION(S): 20 OINTMENT TOPICAL at 17:27

## 2023-12-29 RX ADMIN — SODIUM CHLORIDE 10 MILLILITER(S): 9 INJECTION INTRAMUSCULAR; INTRAVENOUS; SUBCUTANEOUS at 17:24

## 2023-12-29 RX ADMIN — Medication 50 MILLIEQUIVALENT(S): at 17:27

## 2023-12-29 RX ADMIN — INSULIN HUMAN 3 UNIT(S)/HR: 100 INJECTION, SOLUTION SUBCUTANEOUS at 17:24

## 2023-12-29 RX ADMIN — MUPIROCIN 1 APPLICATION(S): 20 OINTMENT TOPICAL at 06:07

## 2023-12-29 RX ADMIN — HYDROMORPHONE HYDROCHLORIDE 0.5 MILLIGRAM(S): 2 INJECTION INTRAMUSCULAR; INTRAVENOUS; SUBCUTANEOUS at 23:24

## 2023-12-29 RX ADMIN — DEXMEDETOMIDINE HYDROCHLORIDE IN 0.9% SODIUM CHLORIDE 7.4 MICROGRAM(S)/KG/HR: 4 INJECTION INTRAVENOUS at 17:24

## 2023-12-29 RX ADMIN — Medication 2000 MILLIGRAM(S): at 05:59

## 2023-12-29 RX ADMIN — CHLORHEXIDINE GLUCONATE 30 MILLILITER(S): 213 SOLUTION TOPICAL at 06:00

## 2023-12-29 RX ADMIN — Medication 125 MILLILITER(S): at 16:00

## 2023-12-29 RX ADMIN — Medication 125 MILLILITER(S): at 16:37

## 2023-12-29 RX ADMIN — ONDANSETRON 4 MILLIGRAM(S): 8 TABLET, FILM COATED ORAL at 19:58

## 2023-12-29 RX ADMIN — ONDANSETRON 4 MILLIGRAM(S): 8 TABLET, FILM COATED ORAL at 18:17

## 2023-12-29 RX ADMIN — Medication 50 MILLIEQUIVALENT(S): at 13:58

## 2023-12-29 RX ADMIN — Medication 10 MILLIGRAM(S): at 22:16

## 2023-12-29 NOTE — BRIEF OPERATIVE NOTE - OPERATION/FINDINGS
CAD  At the end of closing, patient noted to have increased left pleural chest tube drainage requiring re-opening for exploration and washout. General coagulopathy noted and hemostasis improved CAD  After sternal closure, patient noted to have moderate left pleural chest tube drainage, decision made to re-explore and washout. General coagulopathy noted and hemostasis improved with time and product administration

## 2023-12-29 NOTE — BRIEF OPERATIVE NOTE - NSICDXBRIEFPROCEDURE_GEN_ALL_CORE_FT
PROCEDURES:  CABG, with LARS 29-Dec-2023 13:08:08 CABG x2  LIMA to LAD  Left Radial Artery to [place]

## 2023-12-29 NOTE — PROGRESS NOTE ADULT - SUBJECTIVE AND OBJECTIVE BOX
Patient seen and examined at the bedside.    Remained critically ill on continuous ICU monitoring.    OBJECTIVE:  Vital Signs Last 24 Hrs  T(C): 36.4 (29 Dec 2023 16:00), Max: 37 (28 Dec 2023 19:29)  T(F): 97.5 (29 Dec 2023 16:00), Max: 98.6 (28 Dec 2023 19:29)  HR: 92 (29 Dec 2023 16:00) (61 - 92)  BP: 117/63 (28 Dec 2023 19:29) (117/63 - 120/75)  BP(mean): 90 (28 Dec 2023 17:39) (90 - 90)  RR: 13 (29 Dec 2023 16:00) (8 - 32)  SpO2: 100% (29 Dec 2023 16:00) (97% - 100%)    Parameters below as of 29 Dec 2023 16:00  Patient On (Oxygen Delivery Method): mask, aerosol    O2 Concentration (%): 40      Physical Exam:  General: NAD  Neurology: sedated   Eyes: bilaeral pupils equal and reactive   ENT/Neck: +ETT midline, Neck supple, trachea midline, No JVD   Respiratory: Rales noted bilaterally   CV: RRR, S1S2, no murmurs        [x] Sternal dressing, [x] Mediastinal CT, [x] L&R Pleural CT        [x] Paced rhythm, [x] Temporary pacing- VVI- 50  Abdominal: Soft, NT, ND +BS,   Extremities: 1-2+ pedal edema noted, + peripheral pulses   Skin: No Rashes, Hematoma, Ecchymosis                           Assessment:    Plan:   ***Neuro***  [x] Hx of CVA   [x] Sedated with [x] Precedex   Post operative neuro assessment   Gabapentin Oxycodone, and Meperidine for pain     ***Cardiovascular***  Invasive hemodynamic monitoring, assess perfusion indices   LA 92 / CVP -4 / MAP 74 / Hct 30.9 / Lactate 2.7/ SvO2  [x] Levophed 0.05 mcg/Kg/min  [x] PRBC 4   [x] Plts 3   [x] Cryo 10   [x] LR 500cc  Reassessment of hemodynamics post resuscitation   Monitor chest tube outputs   [x] Bleeding _  [x] PO Amiodarone for afib prophylaxis   [x] ASA [] Plavix [] Statin   Serial EKG and cardiac enzymes     ***Pulmonary***  [x] Aerosal Mask- 40%  Encourage incentive spirometry, continue pulse ox monitoring, follow ABGs     Mode: CPAP with PS  FiO2: 40  PEEP: 5  PS: 10  MAP: 8  PIP: 18                ***GI***  NPO  [x] Protonix      ***Renal***  GFR 91   Continue to monitor I/Os, BUN/Creatinine.   Replete lytes PRN  Ellis present [x] positive     ***ID***  Perioperative coverage with Cefuroxime     ***Endocrine***  [x] Stress Hyperglycemia : HbA1c 5.6%             - [x] Insulin gtt              - Need tight glycemic control to prevent wound infection.      Patient requires continuous monitoring with bedside rhythm monitoring, pulse oximetry monitoring, and continuous central venous and arterial pressure monitoring; and intermittent blood gas analysis. Care plan discussed with the ICU care team.   Patient remained critical, at risk for life threatening decompensation.    I have spent 40 minutes providing critical care management to this patient.    By signing my name below, I, Radha Melissa, attest that this documentation has been prepared under the direction and in the presence of Mirna Ortega MD   Electronically signed: Lenin Rouse, 12-29-23 @ 17:00    I, Mirna Ortega, personally performed the services described in this documentation. all medical record entries made by the scribe were at my direction and in my presence. I have reviewed the chart and agree that the record reflects my personal performance and is accurate and complete  Electronically signed: Mirna Ortega MD  Patient seen and examined at the bedside.    Remained critically ill on continuous ICU monitoring.    OBJECTIVE:  Vital Signs Last 24 Hrs  T(C): 36.4 (29 Dec 2023 16:00), Max: 37 (28 Dec 2023 19:29)  T(F): 97.5 (29 Dec 2023 16:00), Max: 98.6 (28 Dec 2023 19:29)  HR: 92 (29 Dec 2023 16:00) (61 - 92)  BP: 117/63 (28 Dec 2023 19:29) (117/63 - 120/75)  BP(mean): 90 (28 Dec 2023 17:39) (90 - 90)  RR: 13 (29 Dec 2023 16:00) (8 - 32)  SpO2: 100% (29 Dec 2023 16:00) (97% - 100%)    Parameters below as of 29 Dec 2023 16:00  Patient On (Oxygen Delivery Method): mask, aerosol    O2 Concentration (%): 40      Physical Exam:  General: NAD  Neurology: sedated   Eyes: bilaeral pupils equal and reactive   ENT/Neck: +ETT midline, Neck supple, trachea midline, No JVD   Respiratory: Rales noted bilaterally   CV: RRR, S1S2, no murmurs        [x] Sternal dressing, [x] Mediastinal CT, [x] L&R Pleural CT        [x] Paced rhythm, [x] Temporary pacing- VVI- 50  Abdominal: Soft, NT, ND +BS,   Extremities: 1-2+ pedal edema noted, + peripheral pulses   Skin: No Rashes, Hematoma, Ecchymosis                           Assessment:    Plan:   ***Neuro***  [x] Hx of CVA   [x] Sedated with [x] Precedex   Post operative neuro assessment   Gabapentin Oxycodone, and Meperidine for pain     ***Cardiovascular***  Invasive hemodynamic monitoring, assess perfusion indices   IA 92 / CVP -4 / MAP 74 / Hct 30.9 / Lactate 2.7/ SvO2  [x] Levophed 0.05 mcg/Kg/min  [x] PRBC 4   [x] Plts 3   [x] Cryo 10   [x] LR 500cc  Reassessment of hemodynamics post resuscitation   Monitor chest tube outputs   [x] Bleeding _  [x] PO Amiodarone for afib prophylaxis   [x] ASA [] Plavix [] Statin   Serial EKG and cardiac enzymes     ***Pulmonary***  [x] Aerosal Mask- 40%  Encourage incentive spirometry, continue pulse ox monitoring, follow ABGs     Mode: CPAP with PS  FiO2: 40  PEEP: 5  PS: 10  MAP: 8  PIP: 18                ***GI***  NPO  [x] Protonix      ***Renal***  GFR 91   Continue to monitor I/Os, BUN/Creatinine.   Replete lytes PRN  Ellis present [x] positive     ***ID***  Perioperative coverage with Cefuroxime     ***Endocrine***  [x] Stress Hyperglycemia : HbA1c 5.6%             - [x] Insulin gtt              - Need tight glycemic control to prevent wound infection.      Patient requires continuous monitoring with bedside rhythm monitoring, pulse oximetry monitoring, and continuous central venous and arterial pressure monitoring; and intermittent blood gas analysis. Care plan discussed with the ICU care team.   Patient remained critical, at risk for life threatening decompensation.    I have spent 40 minutes providing critical care management to this patient.    By signing my name below, I, Radha Melissa, attest that this documentation has been prepared under the direction and in the presence of Mirna Ortega MD   Electronically signed: Lenin Rouse, 12-29-23 @ 17:00    I, Mirna Ortega, personally performed the services described in this documentation. all medical record entries made by the scribe were at my direction and in my presence. I have reviewed the chart and agree that the record reflects my personal performance and is accurate and complete  Electronically signed: Mirna Ortega MD  Patient seen and examined at the bedside.    Remained critically ill on continuous ICU monitoring.    OBJECTIVE:  Vital Signs Last 24 Hrs  T(C): 36.4 (29 Dec 2023 16:00), Max: 37 (28 Dec 2023 19:29)  T(F): 97.5 (29 Dec 2023 16:00), Max: 98.6 (28 Dec 2023 19:29)  HR: 92 (29 Dec 2023 16:00) (61 - 92)  BP: 117/63 (28 Dec 2023 19:29) (117/63 - 120/75)  BP(mean): 90 (28 Dec 2023 17:39) (90 - 90)  RR: 13 (29 Dec 2023 16:00) (8 - 32)  SpO2: 100% (29 Dec 2023 16:00) (97% - 100%)    Parameters below as of 29 Dec 2023 16:00  Patient On (Oxygen Delivery Method): mask, aerosol    O2 Concentration (%): 40      Physical Exam:  General: NAD  Neurology: sedated   Eyes: bilateral pupils equal and reactive   ENT/Neck: +ETT midline, Neck supple, trachea midline, No JVD   Respiratory: Rales noted bilaterally   CV: RRR, S1S2, no murmurs        [x] Sternal dressing, [x] Mediastinal CT, [x] L&R Pleural CT        [x] Paced rhythm, [x] Temporary pacing- VVI- 50  Abdominal: Soft, NT, ND +BS,   Extremities: 1-2+ pedal edema noted, + peripheral pulses   Skin: No Rashes, Hematoma, Ecchymosis                           Assessment:    Plan:   ***Neuro***  [x] Hx of CVA   [x] Sedated with [x] Precedex   Post operative neuro assessment   Gabapentin Oxycodone, and Meperidine for pain     ***Cardiovascular***  Invasive hemodynamic monitoring, assess perfusion indices   NC 92 / CVP -4 / MAP 70-80 / Hct 30.9 / Lactate 2.7/ SvO2  [x] Levophed 0.05 mcg/Kg/min  [x] PRBC 4   [x] Plts 3   [x] Cryo 10   [x] LR 500cc  Reassessment of hemodynamics post resuscitation   Monitor chest tube outputs   [x] Bleeding _  [x] PO Amiodarone for afib prophylaxis   [x] ASA   Serial EKG and cardiac enzymes     ***Pulmonary***  [x] Aerosal Mask- 40%  Encourage incentive spirometry, continue pulse ox monitoring, follow ABGs     ***GI***  NPO  [x] Protonix      ***Renal***  GFR 91   Continue to monitor I/Os, BUN/Creatinine.   Replete lytes PRN  Ellis present [x] positive     ***ID***  Perioperative coverage with Cefuroxime     ***Endocrine***  [x] Stress Hyperglycemia : HbA1c 5.6%             - [x] Insulin gtt              - Need tight glycemic control to prevent wound infection.      Patient requires continuous monitoring with bedside rhythm monitoring, pulse oximetry monitoring, and continuous central venous and arterial pressure monitoring; and intermittent blood gas analysis. Care plan discussed with the ICU care team.   Patient remained critical, at risk for life threatening decompensation.    I have spent 40 minutes providing critical care management to this patient.    By signing my name below, I, Radha Melissa, attest that this documentation has been prepared under the direction and in the presence of Mirna Ortega MD   Electronically signed: Lenin Rouse, 12-29-23 @ 17:00    I, Mirna Ortega, personally performed the services described in this documentation. all medical record entries made by the scribe were at my direction and in my presence. I have reviewed the chart and agree that the record reflects my personal performance and is accurate and complete  Electronically signed: Mirna Ortega MD  Patient seen and examined at the bedside.    Remained critically ill on continuous ICU monitoring.    OBJECTIVE:  Vital Signs Last 24 Hrs  T(C): 36.4 (29 Dec 2023 16:00), Max: 37 (28 Dec 2023 19:29)  T(F): 97.5 (29 Dec 2023 16:00), Max: 98.6 (28 Dec 2023 19:29)  HR: 92 (29 Dec 2023 16:00) (61 - 92)  BP: 117/63 (28 Dec 2023 19:29) (117/63 - 120/75)  BP(mean): 90 (28 Dec 2023 17:39) (90 - 90)  RR: 13 (29 Dec 2023 16:00) (8 - 32)  SpO2: 100% (29 Dec 2023 16:00) (97% - 100%)    Parameters below as of 29 Dec 2023 16:00  Patient On (Oxygen Delivery Method): mask, aerosol    O2 Concentration (%): 40      Physical Exam:  General: NAD  Neurology: sedated   Eyes: bilateral pupils equal and reactive   ENT/Neck: +ETT midline, Neck supple, trachea midline, No JVD   Respiratory: Rales noted bilaterally   CV: RRR, S1S2, no murmurs        [x] Sternal dressing, [x] Mediastinal CT, [x] L&R Pleural CT        [x] Paced rhythm, [x] Temporary pacing- VVI- 50  Abdominal: Soft, NT, ND +BS,   Extremities: 1-2+ pedal edema noted, + peripheral pulses   Skin: No Rashes, Hematoma, Ecchymosis                           Assessment:    Plan:   ***Neuro***  [x] Hx of CVA   [x] Sedated with [x] Precedex   Post operative neuro assessment   Gabapentin Oxycodone, and Meperidine for pain     ***Cardiovascular***  Invasive hemodynamic monitoring, assess perfusion indices   MO 92 / CVP -4 / MAP 70-80 / Hct 30.9 / Lactate 2.7/ SvO2  [x] Levophed 0.05 mcg/Kg/min  [x] PRBC 4   [x] Plts 3   [x] Cryo 10   [x] LR 500cc  Reassessment of hemodynamics post resuscitation   Monitor chest tube outputs   [x] Bleeding _  [x] PO Amiodarone for afib prophylaxis   [x] ASA   Serial EKG and cardiac enzymes     ***Pulmonary***  [x] Aerosal Mask- 40%  Encourage incentive spirometry, continue pulse ox monitoring, follow ABGs     ***GI***  NPO  [x] Protonix      ***Renal***  GFR 91   Continue to monitor I/Os, BUN/Creatinine.   Replete lytes PRN  Ellis present [x] positive     ***ID***  Perioperative coverage with Cefuroxime     ***Endocrine***  [x] Stress Hyperglycemia : HbA1c 5.6%             - [x] Insulin gtt              - Need tight glycemic control to prevent wound infection.      Patient requires continuous monitoring with bedside rhythm monitoring, pulse oximetry monitoring, and continuous central venous and arterial pressure monitoring; and intermittent blood gas analysis. Care plan discussed with the ICU care team.   Patient remained critical, at risk for life threatening decompensation.    I have spent 40 minutes providing critical care management to this patient.    By signing my name below, I, Radha Melissa, attest that this documentation has been prepared under the direction and in the presence of Mirna Ortega MD   Electronically signed: Lenin Rouse, 12-29-23 @ 17:00    I, Mirna Ortega, personally performed the services described in this documentation. all medical record entries made by the scribe were at my direction and in my presence. I have reviewed the chart and agree that the record reflects my personal performance and is accurate and complete  Electronically signed: Mirna Ortega MD  Patient seen and examined at the bedside.    Remained critically ill on continuous ICU monitoring.    OBJECTIVE:  Vital Signs Last 24 Hrs  T(C): 36.4 (29 Dec 2023 16:00), Max: 37 (28 Dec 2023 19:29)  T(F): 97.5 (29 Dec 2023 16:00), Max: 98.6 (28 Dec 2023 19:29)  HR: 92 (29 Dec 2023 16:00) (61 - 92)  BP: 117/63 (28 Dec 2023 19:29) (117/63 - 120/75)  BP(mean): 90 (28 Dec 2023 17:39) (90 - 90)  RR: 13 (29 Dec 2023 16:00) (8 - 32)  SpO2: 100% (29 Dec 2023 16:00) (97% - 100%)    Parameters below as of 29 Dec 2023 16:00  Patient On (Oxygen Delivery Method): mask, aerosol    O2 Concentration (%): 40      Physical Exam:  General: in bed with multiple lines, tubes & gtt   Neurology: sleepy, lethargic but able to move all 4 & f/u simple commands   Eyes: bilateral pupils equal and reactive   ENT/Neck:  Neck supple, trachea midline, No JVD   Respiratory: Rales noted bilaterally   CV: RRR, S1S2, no murmurs        [x] Sternal dressing, [x] Mediastinal CT, [x] L&R Pleural CT        [x] Paced rhythm, [x] Temporary pacing- VVI- 50  Abdominal: Soft, NT, ND +BS,   Extremities: 1-2+ pedal edema noted, + peripheral pulses L forearm dressing   Skin: No Rashes, Hematoma, Ecchymosis                           Assessment:  58 yr female HTN / HLD / CAD / S/P PCI   S/P Cath multivessel CAD   S/P CABG X 2 ( L radial )  Post op labile BP related to hypovolemia &  bradycardia   Lactic acidosis   Intra-op bleeding requiring blood & products   Hyperglycemia & hypokalemia   Post extubation lethargy & respiratory acidosis requiring BiPAP support    Plan:   ***Neuro***  Post extubation lethargy with no focality related anesthesia   Precedex, Gabapentin Dced  continue neuro check q hourly     ***Cardiovascular***  S/P CABG x 2 with labile hemodynamics requiring volume resuscitation , Atrial pacing & Levo titration   Invasive hemodynamic monitoring, assess serial perfusion indices   AAI 92/7  / CVP 2-4 / MAP 55-75 / Hct 30.9 / Lactate 2.9 / SvO2 96  [x] A-Paced reassess underlying HR, avoid AV gonzález blocking medication D/C ERP Amiodarone   [x] Albumin 750 cc   [x] LR 1000 cc   [x] Levophed (Dced)  [x] F/u CBC / INR / Fibrinogen        OR [X]  PRBC X 4 [X] Plt x 3 [x] Cryo x 10   [x] Monitor chest tube outputs   [x] K >4 [X] Mg >2  [x] High intensity statin   [x] ASA   Serial EKG and cardiac enzymes     ***Pulmonary***  [x] Post extubation mild Respiratory acidosis      BiPAP support       Lung expansion maneuvers       IS / Spo2 / ABG / CXR         ***GI***  NPO  [x] Protonix      ***Renal***  GFR 91   Continue to monitor I/Os, BUN/Creatinine.   Replete lytes PRN  Ellis present [x] positive     ***ID***  Perioperative coverage with Cefuroxime     ***Endocrine***  [x] Stress Hyperglycemia : HbA1c 5.6%             - [x] Insulin gtt              - Need tight glycemic control to prevent wound infection.      Patient requires continuous monitoring with bedside rhythm monitoring, pulse oximetry monitoring, and continuous central venous and arterial pressure monitoring; and intermittent blood gas analysis. Care plan discussed with the ICU care team.   Patient remained critical, at risk for life threatening decompensation.    I have spent 40 minutes providing critical care management to this patient.    By signing my name below, I, Radha Melissa, attest that this documentation has been prepared under the direction and in the presence of Mirna Ortega MD   Electronically signed: Lenin Rouse, 12-29-23 @ 17:00    I, Mirna Ortega, personally performed the services described in this documentation. all medical record entries made by the scribe were at my direction and in my presence. I have reviewed the chart and agree that the record reflects my personal performance and is accurate and complete  Electronically signed: Mirna Ortega MD

## 2023-12-29 NOTE — AIRWAY REMOVAL NOTE  ADULT & PEDS - ARTIFICAL AIRWAY REMOVAL COMMENTS
Written order for extubation verified. The patient was identified by full name and birth date compared to the identification band. Present during the procedure was (Katie VERMA)

## 2023-12-29 NOTE — BRIEF OPERATIVE NOTE - COMMENTS
*EBL not applicable due to use of cell saver and cardiotomy suction on CPB  EDUARDO James first assisted for the entirety of the case, including sternotomy, cardiopulmonary bypass, placement of coronary grafts, closing, re-opening, exploration/washout, closing.   No unexpected foreign bodies were apparent on preliminary review of post-op x-ray. Reviewed by Dr. Valdez  *There were no qualified residents/fellows to assist this case. *EBL not applicable due to use of cell saver and cardiotomy suction on CPB  EDUARDO James first assisted for the entirety of the case, including sternotomy, cardiopulmonary bypass, placement of coronary grafts, closing, re-opening, exploration/washout, closing.     Radial and Ulnar Artery Assessed pre-op and intra operative.  The ulnar and radial artery assessed via the modified navi test, SPO2 and ultrasound assessment which showed good Ulnar and Radial arterial flow.    No unexpected foreign bodies were apparent on preliminary review of post-op x-ray. Reviewed by Dr. Valdez  *There were no qualified residents/fellows to assist this case.

## 2023-12-30 LAB
ALBUMIN SERPL ELPH-MCNC: 4.3 G/DL — SIGNIFICANT CHANGE UP (ref 3.3–5)
ALBUMIN SERPL ELPH-MCNC: 4.3 G/DL — SIGNIFICANT CHANGE UP (ref 3.3–5)
ALP SERPL-CCNC: 34 U/L — LOW (ref 40–120)
ALP SERPL-CCNC: 34 U/L — LOW (ref 40–120)
ALT FLD-CCNC: 19 U/L — SIGNIFICANT CHANGE UP (ref 10–45)
ALT FLD-CCNC: 19 U/L — SIGNIFICANT CHANGE UP (ref 10–45)
ANION GAP SERPL CALC-SCNC: 11 MMOL/L — SIGNIFICANT CHANGE UP (ref 5–17)
ANION GAP SERPL CALC-SCNC: 11 MMOL/L — SIGNIFICANT CHANGE UP (ref 5–17)
APTT BLD: 25.9 SEC — SIGNIFICANT CHANGE UP (ref 24.5–35.6)
APTT BLD: 25.9 SEC — SIGNIFICANT CHANGE UP (ref 24.5–35.6)
AST SERPL-CCNC: 27 U/L — SIGNIFICANT CHANGE UP (ref 10–40)
AST SERPL-CCNC: 27 U/L — SIGNIFICANT CHANGE UP (ref 10–40)
BASOPHILS # BLD AUTO: 0 K/UL — SIGNIFICANT CHANGE UP (ref 0–0.2)
BASOPHILS # BLD AUTO: 0 K/UL — SIGNIFICANT CHANGE UP (ref 0–0.2)
BASOPHILS NFR BLD AUTO: 0 % — SIGNIFICANT CHANGE UP (ref 0–2)
BASOPHILS NFR BLD AUTO: 0 % — SIGNIFICANT CHANGE UP (ref 0–2)
BILIRUB SERPL-MCNC: 1.7 MG/DL — HIGH (ref 0.2–1.2)
BILIRUB SERPL-MCNC: 1.7 MG/DL — HIGH (ref 0.2–1.2)
BUN SERPL-MCNC: 11 MG/DL — SIGNIFICANT CHANGE UP (ref 7–23)
BUN SERPL-MCNC: 11 MG/DL — SIGNIFICANT CHANGE UP (ref 7–23)
CALCIUM SERPL-MCNC: 9 MG/DL — SIGNIFICANT CHANGE UP (ref 8.4–10.5)
CALCIUM SERPL-MCNC: 9 MG/DL — SIGNIFICANT CHANGE UP (ref 8.4–10.5)
CHLORIDE SERPL-SCNC: 106 MMOL/L — SIGNIFICANT CHANGE UP (ref 96–108)
CHLORIDE SERPL-SCNC: 106 MMOL/L — SIGNIFICANT CHANGE UP (ref 96–108)
CO2 SERPL-SCNC: 25 MMOL/L — SIGNIFICANT CHANGE UP (ref 22–31)
CO2 SERPL-SCNC: 25 MMOL/L — SIGNIFICANT CHANGE UP (ref 22–31)
CREAT SERPL-MCNC: 0.74 MG/DL — SIGNIFICANT CHANGE UP (ref 0.5–1.3)
CREAT SERPL-MCNC: 0.74 MG/DL — SIGNIFICANT CHANGE UP (ref 0.5–1.3)
EGFR: 94 ML/MIN/1.73M2 — SIGNIFICANT CHANGE UP
EGFR: 94 ML/MIN/1.73M2 — SIGNIFICANT CHANGE UP
EOSINOPHIL # BLD AUTO: 0 K/UL — SIGNIFICANT CHANGE UP (ref 0–0.5)
EOSINOPHIL # BLD AUTO: 0 K/UL — SIGNIFICANT CHANGE UP (ref 0–0.5)
EOSINOPHIL NFR BLD AUTO: 0 % — SIGNIFICANT CHANGE UP (ref 0–6)
EOSINOPHIL NFR BLD AUTO: 0 % — SIGNIFICANT CHANGE UP (ref 0–6)
FIBRINOGEN AG PPP IA-MCNC: 286 MG/DL — SIGNIFICANT CHANGE UP (ref 233–496)
FIBRINOGEN AG PPP IA-MCNC: 286 MG/DL — SIGNIFICANT CHANGE UP (ref 233–496)
FIBRINOGEN PPP-MCNC: 286 MG/DL — SIGNIFICANT CHANGE UP (ref 200–445)
FIBRINOGEN PPP-MCNC: 286 MG/DL — SIGNIFICANT CHANGE UP (ref 200–445)
GAS PNL BLDA: SIGNIFICANT CHANGE UP
GLUCOSE BLDC GLUCOMTR-MCNC: 102 MG/DL — HIGH (ref 70–99)
GLUCOSE BLDC GLUCOMTR-MCNC: 102 MG/DL — HIGH (ref 70–99)
GLUCOSE BLDC GLUCOMTR-MCNC: 109 MG/DL — HIGH (ref 70–99)
GLUCOSE BLDC GLUCOMTR-MCNC: 109 MG/DL — HIGH (ref 70–99)
GLUCOSE BLDC GLUCOMTR-MCNC: 112 MG/DL — HIGH (ref 70–99)
GLUCOSE BLDC GLUCOMTR-MCNC: 112 MG/DL — HIGH (ref 70–99)
GLUCOSE BLDC GLUCOMTR-MCNC: 121 MG/DL — HIGH (ref 70–99)
GLUCOSE BLDC GLUCOMTR-MCNC: 138 MG/DL — HIGH (ref 70–99)
GLUCOSE BLDC GLUCOMTR-MCNC: 138 MG/DL — HIGH (ref 70–99)
GLUCOSE BLDC GLUCOMTR-MCNC: 139 MG/DL — HIGH (ref 70–99)
GLUCOSE BLDC GLUCOMTR-MCNC: 139 MG/DL — HIGH (ref 70–99)
GLUCOSE BLDC GLUCOMTR-MCNC: 140 MG/DL — HIGH (ref 70–99)
GLUCOSE BLDC GLUCOMTR-MCNC: 140 MG/DL — HIGH (ref 70–99)
GLUCOSE BLDC GLUCOMTR-MCNC: 96 MG/DL — SIGNIFICANT CHANGE UP (ref 70–99)
GLUCOSE BLDC GLUCOMTR-MCNC: 96 MG/DL — SIGNIFICANT CHANGE UP (ref 70–99)
GLUCOSE SERPL-MCNC: 103 MG/DL — HIGH (ref 70–99)
GLUCOSE SERPL-MCNC: 103 MG/DL — HIGH (ref 70–99)
HCT VFR BLD CALC: 24.6 % — LOW (ref 34.5–45)
HCT VFR BLD CALC: 24.6 % — LOW (ref 34.5–45)
HGB BLD-MCNC: 8.8 G/DL — LOW (ref 11.5–15.5)
HGB BLD-MCNC: 8.8 G/DL — LOW (ref 11.5–15.5)
INR BLD: 1.13 RATIO — SIGNIFICANT CHANGE UP (ref 0.85–1.18)
INR BLD: 1.13 RATIO — SIGNIFICANT CHANGE UP (ref 0.85–1.18)
LYMPHOCYTES # BLD AUTO: 0.35 K/UL — LOW (ref 1–3.3)
LYMPHOCYTES # BLD AUTO: 0.35 K/UL — LOW (ref 1–3.3)
LYMPHOCYTES # BLD AUTO: 3.5 % — LOW (ref 13–44)
LYMPHOCYTES # BLD AUTO: 3.5 % — LOW (ref 13–44)
MAGNESIUM SERPL-MCNC: 2.1 MG/DL — SIGNIFICANT CHANGE UP (ref 1.6–2.6)
MAGNESIUM SERPL-MCNC: 2.1 MG/DL — SIGNIFICANT CHANGE UP (ref 1.6–2.6)
MANUAL SMEAR VERIFICATION: SIGNIFICANT CHANGE UP
MANUAL SMEAR VERIFICATION: SIGNIFICANT CHANGE UP
MCHC RBC-ENTMCNC: 30.2 PG — SIGNIFICANT CHANGE UP (ref 27–34)
MCHC RBC-ENTMCNC: 30.2 PG — SIGNIFICANT CHANGE UP (ref 27–34)
MCHC RBC-ENTMCNC: 35.8 GM/DL — SIGNIFICANT CHANGE UP (ref 32–36)
MCHC RBC-ENTMCNC: 35.8 GM/DL — SIGNIFICANT CHANGE UP (ref 32–36)
MCV RBC AUTO: 84.5 FL — SIGNIFICANT CHANGE UP (ref 80–100)
MCV RBC AUTO: 84.5 FL — SIGNIFICANT CHANGE UP (ref 80–100)
MONOCYTES # BLD AUTO: 0.18 K/UL — SIGNIFICANT CHANGE UP (ref 0–0.9)
MONOCYTES # BLD AUTO: 0.18 K/UL — SIGNIFICANT CHANGE UP (ref 0–0.9)
MONOCYTES NFR BLD AUTO: 1.8 % — LOW (ref 2–14)
MONOCYTES NFR BLD AUTO: 1.8 % — LOW (ref 2–14)
NEUTROPHILS # BLD AUTO: 9.35 K/UL — HIGH (ref 1.8–7.4)
NEUTROPHILS # BLD AUTO: 9.35 K/UL — HIGH (ref 1.8–7.4)
NEUTROPHILS NFR BLD AUTO: 92.9 % — HIGH (ref 43–77)
NEUTROPHILS NFR BLD AUTO: 92.9 % — HIGH (ref 43–77)
NEUTS BAND # BLD: 1.8 % — SIGNIFICANT CHANGE UP (ref 0–8)
NEUTS BAND # BLD: 1.8 % — SIGNIFICANT CHANGE UP (ref 0–8)
PHOSPHATE SERPL-MCNC: 3.7 MG/DL — SIGNIFICANT CHANGE UP (ref 2.5–4.5)
PHOSPHATE SERPL-MCNC: 3.7 MG/DL — SIGNIFICANT CHANGE UP (ref 2.5–4.5)
PLAT MORPH BLD: NORMAL — SIGNIFICANT CHANGE UP
PLAT MORPH BLD: NORMAL — SIGNIFICANT CHANGE UP
PLATELET # BLD AUTO: 178 K/UL — SIGNIFICANT CHANGE UP (ref 150–400)
PLATELET # BLD AUTO: 178 K/UL — SIGNIFICANT CHANGE UP (ref 150–400)
POTASSIUM SERPL-MCNC: 4.1 MMOL/L — SIGNIFICANT CHANGE UP (ref 3.5–5.3)
POTASSIUM SERPL-MCNC: 4.1 MMOL/L — SIGNIFICANT CHANGE UP (ref 3.5–5.3)
POTASSIUM SERPL-SCNC: 4.1 MMOL/L — SIGNIFICANT CHANGE UP (ref 3.5–5.3)
POTASSIUM SERPL-SCNC: 4.1 MMOL/L — SIGNIFICANT CHANGE UP (ref 3.5–5.3)
PROT SERPL-MCNC: 5.7 G/DL — LOW (ref 6–8.3)
PROT SERPL-MCNC: 5.7 G/DL — LOW (ref 6–8.3)
PROTHROM AB SERPL-ACNC: 12.4 SEC — SIGNIFICANT CHANGE UP (ref 9.5–13)
PROTHROM AB SERPL-ACNC: 12.4 SEC — SIGNIFICANT CHANGE UP (ref 9.5–13)
RBC # BLD: 2.91 M/UL — LOW (ref 3.8–5.2)
RBC # BLD: 2.91 M/UL — LOW (ref 3.8–5.2)
RBC # FLD: 13.1 % — SIGNIFICANT CHANGE UP (ref 10.3–14.5)
RBC # FLD: 13.1 % — SIGNIFICANT CHANGE UP (ref 10.3–14.5)
RBC BLD AUTO: SIGNIFICANT CHANGE UP
RBC BLD AUTO: SIGNIFICANT CHANGE UP
SODIUM SERPL-SCNC: 142 MMOL/L — SIGNIFICANT CHANGE UP (ref 135–145)
SODIUM SERPL-SCNC: 142 MMOL/L — SIGNIFICANT CHANGE UP (ref 135–145)
WBC # BLD: 9.87 K/UL — SIGNIFICANT CHANGE UP (ref 3.8–10.5)
WBC # BLD: 9.87 K/UL — SIGNIFICANT CHANGE UP (ref 3.8–10.5)
WBC # FLD AUTO: 9.87 K/UL — SIGNIFICANT CHANGE UP (ref 3.8–10.5)
WBC # FLD AUTO: 9.87 K/UL — SIGNIFICANT CHANGE UP (ref 3.8–10.5)

## 2023-12-30 PROCEDURE — 71045 X-RAY EXAM CHEST 1 VIEW: CPT | Mod: 26

## 2023-12-30 RX ORDER — ACETAMINOPHEN 500 MG
1000 TABLET ORAL ONCE
Refills: 0 | Status: COMPLETED | OUTPATIENT
Start: 2023-12-30 | End: 2023-12-30

## 2023-12-30 RX ORDER — ENOXAPARIN SODIUM 100 MG/ML
40 INJECTION SUBCUTANEOUS EVERY 24 HOURS
Refills: 0 | Status: DISCONTINUED | OUTPATIENT
Start: 2023-12-30 | End: 2024-01-03

## 2023-12-30 RX ORDER — INSULIN LISPRO 100/ML
VIAL (ML) SUBCUTANEOUS
Refills: 0 | Status: DISCONTINUED | OUTPATIENT
Start: 2023-12-30 | End: 2023-12-31

## 2023-12-30 RX ORDER — METOPROLOL TARTRATE 50 MG
12.5 TABLET ORAL
Refills: 0 | Status: DISCONTINUED | OUTPATIENT
Start: 2023-12-30 | End: 2024-01-02

## 2023-12-30 RX ORDER — HYDROMORPHONE HYDROCHLORIDE 2 MG/ML
0.25 INJECTION INTRAMUSCULAR; INTRAVENOUS; SUBCUTANEOUS ONCE
Refills: 0 | Status: DISCONTINUED | OUTPATIENT
Start: 2023-12-30 | End: 2023-12-30

## 2023-12-30 RX ORDER — PANTOPRAZOLE SODIUM 20 MG/1
40 TABLET, DELAYED RELEASE ORAL
Refills: 0 | Status: DISCONTINUED | OUTPATIENT
Start: 2023-12-30 | End: 2024-01-03

## 2023-12-30 RX ADMIN — Medication 10 MILLIGRAM(S): at 22:38

## 2023-12-30 RX ADMIN — Medication 650 MILLIGRAM(S): at 15:02

## 2023-12-30 RX ADMIN — Medication 650 MILLIGRAM(S): at 06:52

## 2023-12-30 RX ADMIN — Medication 650 MILLIGRAM(S): at 06:29

## 2023-12-30 RX ADMIN — SENNA PLUS 2 TABLET(S): 8.6 TABLET ORAL at 22:36

## 2023-12-30 RX ADMIN — Medication 500 MILLIGRAM(S): at 06:29

## 2023-12-30 RX ADMIN — Medication 10 MILLIGRAM(S): at 06:28

## 2023-12-30 RX ADMIN — Medication 100 MILLIGRAM(S): at 18:50

## 2023-12-30 RX ADMIN — Medication 1000 MILLIGRAM(S): at 09:45

## 2023-12-30 RX ADMIN — ENOXAPARIN SODIUM 40 MILLIGRAM(S): 100 INJECTION SUBCUTANEOUS at 14:11

## 2023-12-30 RX ADMIN — Medication 10 MILLIGRAM(S): at 14:11

## 2023-12-30 RX ADMIN — HYDROMORPHONE HYDROCHLORIDE 0.25 MILLIGRAM(S): 2 INJECTION INTRAMUSCULAR; INTRAVENOUS; SUBCUTANEOUS at 10:25

## 2023-12-30 RX ADMIN — MUPIROCIN 1 APPLICATION(S): 20 OINTMENT TOPICAL at 06:29

## 2023-12-30 RX ADMIN — CHLORHEXIDINE GLUCONATE 1 APPLICATION(S): 213 SOLUTION TOPICAL at 22:13

## 2023-12-30 RX ADMIN — ATORVASTATIN CALCIUM 80 MILLIGRAM(S): 80 TABLET, FILM COATED ORAL at 22:34

## 2023-12-30 RX ADMIN — Medication 400 MILLIGRAM(S): at 09:19

## 2023-12-30 RX ADMIN — Medication 650 MILLIGRAM(S): at 14:11

## 2023-12-30 RX ADMIN — POLYETHYLENE GLYCOL 3350 17 GRAM(S): 17 POWDER, FOR SOLUTION ORAL at 12:17

## 2023-12-30 RX ADMIN — MUPIROCIN 1 APPLICATION(S): 20 OINTMENT TOPICAL at 17:14

## 2023-12-30 RX ADMIN — Medication 500 MILLIGRAM(S): at 17:14

## 2023-12-30 RX ADMIN — CHLORHEXIDINE GLUCONATE 1 APPLICATION(S): 213 SOLUTION TOPICAL at 20:39

## 2023-12-30 RX ADMIN — Medication 81 MILLIGRAM(S): at 11:56

## 2023-12-30 RX ADMIN — Medication 650 MILLIGRAM(S): at 01:00

## 2023-12-30 RX ADMIN — Medication 100 MILLIGRAM(S): at 11:56

## 2023-12-30 RX ADMIN — HYDROMORPHONE HYDROCHLORIDE 0.25 MILLIGRAM(S): 2 INJECTION INTRAMUSCULAR; INTRAVENOUS; SUBCUTANEOUS at 10:04

## 2023-12-30 RX ADMIN — ONDANSETRON 4 MILLIGRAM(S): 8 TABLET, FILM COATED ORAL at 20:39

## 2023-12-30 RX ADMIN — Medication 650 MILLIGRAM(S): at 22:38

## 2023-12-30 RX ADMIN — Medication 100 MILLIGRAM(S): at 04:02

## 2023-12-30 RX ADMIN — Medication 12.5 MILLIGRAM(S): at 16:45

## 2023-12-30 NOTE — PHYSICAL THERAPY INITIAL EVALUATION ADULT - TRANSFER TRAINING, PT EVAL
GOAL: Patient will perform sit to stand transfers independently with proper hand placement in 2 weeks.

## 2023-12-30 NOTE — PHYSICAL THERAPY INITIAL EVALUATION ADULT - ADDITIONAL COMMENTS
Pt reports that she lives in a pvt house with her  +3 BELEN with a unilateral handrail and +1 flight once inside to her bedroom and bathroom also with a unilateral handrail. Pt states that she was independently with all ADLs and mobility prior to admission. Pt does not own any DME.

## 2023-12-30 NOTE — PHYSICAL THERAPY INITIAL EVALUATION ADULT - PERTINENT HX OF CURRENT PROBLEM, REHAB EVAL
58 Fw/ pmh of htn, hld, cardiac stents-2, second hand smoke exposure presents today for elective cardiac catheterization at [facility] with . The patient c/o midsternal chest pain, aggravate with exertion (swimming and fast walking), 7/10, doesn't radiate, resolve with rest. She denies SOB, palpitations, presyncope, syncope,  headache, visual disturbances, CVA, PE, DVT, MARIA INES, abdominal pain, N/V/D/C, hematochezia, melena, dysuria, hematuria, fever, chills. The patient was evaluated by a cardiologist, was found to have abnormal stress test.  Due to patient's symptoms and abnormal non invasive findings, the patient  was recommended to have cardiac catheterization. Stress test 12/23/23 There is a large-sized, severe defect in the anteropical and inferoapical walls consistent with infarct with severe moncho-infarct ischemia. There is a large sized, moderate to severe defect in the distal anteroseptal wall consistent with infarct with moderate to severe moncho-infarct ischemia; pt now s/p cath at [facility] which revealed + CAD; pt transferred to [facility] for OHS - CABG eval with Dr. Valdez. Pt denies cp/ sob at this time. 58 Fw/ pmh of htn, hld, cardiac stents-2, second hand smoke exposure presents today for elective cardiac catheterization at [facility] with . The patient c/o midsternal chest pain, aggravate with exertion (swimming and fast walking), 7/10, doesn't radiate, resolve with rest. She denies SOB, palpitations, presyncope, syncope,  headache, visual disturbances, CVA, PE, DVT, MARIA INES, abdominal pain, N/V/D/C, hematochezia, melena, dysuria, hematuria, fever, chills. The patient was evaluated by a cardiologist, was found to have abnormal stress test.  Due to patient's symptoms and abnormal non invasive findings, the patient  was recommended to have cardiac catheterization. Stress test 12/23/23 There is a large-sized, severe defect in the anteropical and inferoapical walls consistent with infarct with severe moncho-infarct ischemia. There is a large sized, moderate to severe defect in the distal anteroseptal wall consistent with infarct with moderate to severe moncho-infarct ischemia; pt now s/p cath at [facility] which revealed + CAD; pt transferred to [facility] for OHS - CABG eval with Dr. Valdez. Pt denies cp/ sob at this time.

## 2023-12-30 NOTE — PHYSICAL THERAPY INITIAL EVALUATION ADULT - GENERAL OBSERVATIONS, REHAB EVAL
Pt rec'd seated in recliner NAD, +UE IVL, +RIJ, +2LNC, +ICU monitoring, +ext pacer, +karen PAS, +mitchell, +CTx3.

## 2023-12-30 NOTE — PROGRESS NOTE ADULT - SUBJECTIVE AND OBJECTIVE BOX
Patient seen and examined at the bedside.    Remained critically ill on continuous ICU monitoring.    OBJECTIVE:  Vital Signs Last 24 Hrs  T(C): 36.5 (30 Dec 2023 12:00), Max: 37.8 (30 Dec 2023 00:00)  T(F): 97.7 (30 Dec 2023 12:00), Max: 100 (30 Dec 2023 00:00)  HR: 78 (30 Dec 2023 12:00) (64 - 92)  BP: --  BP(mean): --  RR: 22 (30 Dec 2023 12:00) (8 - 32)  SpO2: 99% (30 Dec 2023 12:00) (97% - 100%)    Parameters below as of 30 Dec 2023 08:00  Patient On (Oxygen Delivery Method): nasal cannula  O2 Flow (L/min): 2        Physical Exam:   General: OOBTC, Ambulating in saez  Neurology: interactive, alert, oriented   Eyes: bilateral pupils equal and reactive   ENT/Neck: Neck supple, trachea midline, No JVD   Respiratory: Clear bilaterally   CV: S1S2, no murmurs        [x] Sternal dressing, [x] Mediastinal CT, [x] Bilateral Pleural CTs        [x] Sinus rhythm, [x] Temporary pacing AAI - 92   Abdominal: Soft, NT, ND +BS   Extremities: 1-2+ pedal edema noted, + peripheral pulses   Skin: No Rashes, Hematoma, Ecchymosis                           Assessment:  59 yo F with PMHx of HTN, HLD,  cardiac stents-2, second hand smoke exposure.     CAD s/p CABG x2 on 12/29/23  Hemodynamic instability  Post op respiratory insufficiency  Acute blood loss anemia  Stress hyperglycemia     Today:  - transfused 1u PRBC today   - ambulated     Plan:   ***Neuro***  [x] Nonfocal   Post operative neuro assessment   Pain management with Tylenol and prns    ***Cardiovascular***  Invasive hemodynamic monitoring, assess perfusion indices   SR / CVP 1/ MAP 70/  Hct 24.6/ Lactate 0.7  Volume:  4 prbc, 3 plt, 10U cryo in OR. 1u PRBC in CTU today   Reassessment of hemodynamics post resuscitation   Monitor chest tube outputs   [x] VTE ppx with Lovenox   [x]  ASA [x] Statin   Serial EKG and cardiac enzymes     ***Pulmonary***  [x] NC 2L   Extubated yesterday PM   Titration of FiO2 and PEEP, follow SpO2, CXR, blood gasses     Mode: CPAP with PS  FiO2: 40  PEEP: 5  PS: 10  MAP: 8  PIP: 18              ***GI***  [x] Tolerating regular CC diet  [x] Protonix for stress ulcer ppx  Bowel regimen with Miralax and Senna   Reglan for gut motility   Zofran for nausea     ***Renal***  Continue to monitor I/Os, BUN/Creatinine.   Replete lytes PRN  Ellis present  ***ID***  Perioperative coverage with Cefuroxime     ***Endocrine***  [x] Stress Hyperglycemia : HbA1c 5.6%                - [x] ISS              - Need tight glycemic control to prevent wound infection.            Patient requires continuous monitoring with bedside rhythm monitoring, pulse oximetry monitoring, and continuous central venous and arterial pressure monitoring; and intermittent blood gas analysis. Care plan discussed with the ICU care team.   Patient remained critical, at risk for life threatening decompensation.    I have spent 30 minutes providing critical care management to this patient.    By signing my name below, I, Soraya Rider, attest that this documentation has been prepared under the direction and in the presence of SERGIO Beyer   Electronically signed: Lenin Marquez, 12-30-23 @ 12:22    I, Magdy Alfaro, personally performed the services described in this documentation. all medical record entries made by the madisonibjuan were at my direction and in my presence. I have reviewed the chart and agree that the record reflects my personal performance and is accurate and complete  Electronically signed: SERGIO Beyer  Patient seen and examined at the bedside.    Remained critically ill on continuous ICU monitoring.    OBJECTIVE:  Vital Signs Last 24 Hrs  T(C): 36.5 (30 Dec 2023 12:00), Max: 37.8 (30 Dec 2023 00:00)  T(F): 97.7 (30 Dec 2023 12:00), Max: 100 (30 Dec 2023 00:00)  HR: 78 (30 Dec 2023 12:00) (64 - 92)  RR: 22 (30 Dec 2023 12:00) (8 - 32)  SpO2: 99% (30 Dec 2023 12:00) (97% - 100%)    Parameters below as of 30 Dec 2023 08:00  Patient On (Oxygen Delivery Method): nasal cannula  O2 Flow (L/min): 2        Physical Exam:   General: OOBTC, Ambulating in saez  Neurology: interactive, alert, oriented   Eyes: bilateral pupils equal and reactive   ENT/Neck: Neck supple, trachea midline, No JVD   Respiratory: Clear bilaterally   CV: S1S2, no murmurs        [x] Sternal dressing, [x] Mediastinal CT, [x] Bilateral Pleural CTs        [x] Sinus rhythm, [x] Temporary pacing AAI - 92   Abdominal: Soft, NT, ND +BS   Extremities: 1+ pedal edema noted, + peripheral pulses, ace bandage to L arm with minimal swelling + ulnar and palmar pulses    Skin: No Rashes, Hematoma, Ecchymosis                           Assessment:  59 yo F with PMHx of HTN, HLD,  cardiac stents-2, second hand smoke exposure.     CAD s/p CABG x2 on 12/29/23  Hemodynamic instability  Post op respiratory insufficiency  Acute blood loss anemia  Stress hyperglycemia     Today:  - transfused 1u PRBC today   - ambulated   - wean temporary pacing, start BB as tolerated   - dc mitchell  - stable for SDU     Plan:   ***Neuro***  [x] Nonfocal   Post operative neuro assessment   Pain management with Tylenol and prns    ***Cardiovascular***  Invasive hemodynamic monitoring, assess perfusion indices   SR / CVP 1/ MAP 70/  Hct 24.6/ Lactate 0.7  Volume:  4 prbc, 3 plt, 10U cryo in OR. 1u PRBC in CTU today   Reassessment of hemodynamics post resuscitation   Monitor chest tube outputs   [x] VTE ppx with Lovenox   [x]  ASA [x] Statin   Serial EKG and cardiac enzymes     ***Pulmonary***  [x] NC 2L   Extubated yesterday PM   Titration of FiO2 and PEEP, follow SpO2, CXR, blood gasses   tolerating nasal cannula, wean as tolerated             ***GI***  [x] Tolerating regular CC diet  [x] Protonix for stress ulcer ppx  Bowel regimen with Miralax and Senna   Reglan for gut motility   Zofran for nausea     ***Renal***  Continue to monitor I/Os, BUN/Creatinine.   Replete lytes PRN  Mitchell present  ***ID***  Perioperative coverage with Cefuroxime     ***Endocrine***  [x] Stress Hyperglycemia : HbA1c 5.6%                - [x] ISS              - Need tight glycemic control to prevent wound infection.            Patient requires continuous monitoring with bedside rhythm monitoring, pulse oximetry monitoring, and continuous central venous and arterial pressure monitoring; and intermittent blood gas analysis. Care plan discussed with the ICU care team.   Patient remained critical, at risk for life threatening decompensation.    I have spent 45 minutes providing critical care management to this patient.    By signing my name below, I, Soraya Rider, attest that this documentation has been prepared under the direction and in the presence of SERGIO Beyer   Electronically signed: Iram Marquez, 12-30-23 @ 12:22    I, Magdy Alfaro, personally performed the services described in this documentation. all medical record entries made by the iram were at my direction and in my presence. I have reviewed the chart and agree that the record reflects my personal performance and is accurate and complete  Electronically signed: SERGIO Beyer  Patient seen and examined at the bedside.    Remained critically ill on continuous ICU monitoring.    OBJECTIVE:  Vital Signs Last 24 Hrs  T(C): 36.5 (30 Dec 2023 12:00), Max: 37.8 (30 Dec 2023 00:00)  T(F): 97.7 (30 Dec 2023 12:00), Max: 100 (30 Dec 2023 00:00)  HR: 78 (30 Dec 2023 12:00) (64 - 92)  RR: 22 (30 Dec 2023 12:00) (8 - 32)  SpO2: 99% (30 Dec 2023 12:00) (97% - 100%)    Parameters below as of 30 Dec 2023 08:00  Patient On (Oxygen Delivery Method): nasal cannula  O2 Flow (L/min): 2        Physical Exam:   General: OOBTC, Ambulating in saez  Neurology: interactive, alert, oriented   Eyes: bilateral pupils equal and reactive   ENT/Neck: Neck supple, trachea midline, No JVD   Respiratory: Clear bilaterally   CV: S1S2, no murmurs        [x] Sternal dressing, [x] Mediastinal CT, [x] Bilateral Pleural CTs        [x] Sinus rhythm, [x] Temporary pacing AAI - 92   Abdominal: Soft, NT, ND +BS   Extremities: 1+ pedal edema noted, + peripheral pulses, ace bandage to L arm with minimal swelling + ulnar and palmar pulses    Skin: No Rashes, Hematoma, Ecchymosis                           Assessment:  57 yo F with PMHx of HTN, HLD,  cardiac stents-2, second hand smoke exposure.     CAD s/p CABG x2 on 12/29/23  Hemodynamic instability  Post op respiratory insufficiency  Acute blood loss anemia  Stress hyperglycemia     Today:  - transfused 1u PRBC today   - ambulated   - wean temporary pacing, start BB as tolerated   - dc mitchell  - stable for SDU     Plan:   ***Neuro***  [x] Nonfocal   Post operative neuro assessment   Pain management with Tylenol and prns    ***Cardiovascular***  Invasive hemodynamic monitoring, assess perfusion indices   SR / CVP 1/ MAP 70/  Hct 24.6/ Lactate 0.7  Volume:  4 prbc, 3 plt, 10U cryo in OR. 1u PRBC in CTU today   Reassessment of hemodynamics post resuscitation   Monitor chest tube outputs   [x] VTE ppx with Lovenox   [x]  ASA [x] Statin   Serial EKG and cardiac enzymes     ***Pulmonary***  [x] NC 2L   Extubated yesterday PM   Titration of FiO2 and PEEP, follow SpO2, CXR, blood gasses   tolerating nasal cannula, wean as tolerated             ***GI***  [x] Tolerating regular CC diet  [x] Protonix for stress ulcer ppx  Bowel regimen with Miralax and Senna   Reglan for gut motility   Zofran for nausea     ***Renal***  Continue to monitor I/Os, BUN/Creatinine.   Replete lytes PRN  Mitchell present  ***ID***  Perioperative coverage with Cefuroxime     ***Endocrine***  [x] Stress Hyperglycemia : HbA1c 5.6%                - [x] ISS              - Need tight glycemic control to prevent wound infection.            Patient requires continuous monitoring with bedside rhythm monitoring, pulse oximetry monitoring, and continuous central venous and arterial pressure monitoring; and intermittent blood gas analysis. Care plan discussed with the ICU care team.   Patient remained critical, at risk for life threatening decompensation.    I have spent 45 minutes providing critical care management to this patient.    By signing my name below, I, Soraya Rider, attest that this documentation has been prepared under the direction and in the presence of SERGIO Beyer   Electronically signed: Iram Marquez, 12-30-23 @ 12:22    I, Magdy Alfaro, personally performed the services described in this documentation. all medical record entries made by the iram were at my direction and in my presence. I have reviewed the chart and agree that the record reflects my personal performance and is accurate and complete  Electronically signed: SERGIO Beyer

## 2023-12-30 NOTE — PHYSICAL THERAPY INITIAL EVALUATION ADULT - PLANNED THERAPY INTERVENTIONS, PT EVAL
stair training: GOAL: Pt will negotiate up/down 3 steps with 1 handrail ascending independently in 2 weeks./gait training/strengthening/transfer training

## 2023-12-31 DIAGNOSIS — Z95.1 PRESENCE OF AORTOCORONARY BYPASS GRAFT: ICD-10-CM

## 2023-12-31 LAB
ALBUMIN SERPL ELPH-MCNC: 3.8 G/DL — SIGNIFICANT CHANGE UP (ref 3.3–5)
ALBUMIN SERPL ELPH-MCNC: 3.8 G/DL — SIGNIFICANT CHANGE UP (ref 3.3–5)
ALP SERPL-CCNC: 44 U/L — SIGNIFICANT CHANGE UP (ref 40–120)
ALP SERPL-CCNC: 44 U/L — SIGNIFICANT CHANGE UP (ref 40–120)
ALT FLD-CCNC: 46 U/L — HIGH (ref 10–45)
ALT FLD-CCNC: 46 U/L — HIGH (ref 10–45)
ANION GAP SERPL CALC-SCNC: 7 MMOL/L — SIGNIFICANT CHANGE UP (ref 5–17)
ANION GAP SERPL CALC-SCNC: 7 MMOL/L — SIGNIFICANT CHANGE UP (ref 5–17)
AST SERPL-CCNC: 49 U/L — HIGH (ref 10–40)
AST SERPL-CCNC: 49 U/L — HIGH (ref 10–40)
BASOPHILS # BLD AUTO: 0.02 K/UL — SIGNIFICANT CHANGE UP (ref 0–0.2)
BASOPHILS # BLD AUTO: 0.02 K/UL — SIGNIFICANT CHANGE UP (ref 0–0.2)
BASOPHILS NFR BLD AUTO: 0.2 % — SIGNIFICANT CHANGE UP (ref 0–2)
BASOPHILS NFR BLD AUTO: 0.2 % — SIGNIFICANT CHANGE UP (ref 0–2)
BILIRUB SERPL-MCNC: 1.4 MG/DL — HIGH (ref 0.2–1.2)
BILIRUB SERPL-MCNC: 1.4 MG/DL — HIGH (ref 0.2–1.2)
BUN SERPL-MCNC: 16 MG/DL — SIGNIFICANT CHANGE UP (ref 7–23)
BUN SERPL-MCNC: 16 MG/DL — SIGNIFICANT CHANGE UP (ref 7–23)
CALCIUM SERPL-MCNC: 8.8 MG/DL — SIGNIFICANT CHANGE UP (ref 8.4–10.5)
CALCIUM SERPL-MCNC: 8.8 MG/DL — SIGNIFICANT CHANGE UP (ref 8.4–10.5)
CHLORIDE SERPL-SCNC: 107 MMOL/L — SIGNIFICANT CHANGE UP (ref 96–108)
CHLORIDE SERPL-SCNC: 107 MMOL/L — SIGNIFICANT CHANGE UP (ref 96–108)
CO2 SERPL-SCNC: 26 MMOL/L — SIGNIFICANT CHANGE UP (ref 22–31)
CO2 SERPL-SCNC: 26 MMOL/L — SIGNIFICANT CHANGE UP (ref 22–31)
CREAT SERPL-MCNC: 0.85 MG/DL — SIGNIFICANT CHANGE UP (ref 0.5–1.3)
CREAT SERPL-MCNC: 0.85 MG/DL — SIGNIFICANT CHANGE UP (ref 0.5–1.3)
EGFR: 80 ML/MIN/1.73M2 — SIGNIFICANT CHANGE UP
EGFR: 80 ML/MIN/1.73M2 — SIGNIFICANT CHANGE UP
EOSINOPHIL # BLD AUTO: 0.01 K/UL — SIGNIFICANT CHANGE UP (ref 0–0.5)
EOSINOPHIL # BLD AUTO: 0.01 K/UL — SIGNIFICANT CHANGE UP (ref 0–0.5)
EOSINOPHIL NFR BLD AUTO: 0.1 % — SIGNIFICANT CHANGE UP (ref 0–6)
EOSINOPHIL NFR BLD AUTO: 0.1 % — SIGNIFICANT CHANGE UP (ref 0–6)
GAS PNL BLDA: SIGNIFICANT CHANGE UP
GAS PNL BLDA: SIGNIFICANT CHANGE UP
GLUCOSE BLDC GLUCOMTR-MCNC: 117 MG/DL — HIGH (ref 70–99)
GLUCOSE BLDC GLUCOMTR-MCNC: 117 MG/DL — HIGH (ref 70–99)
GLUCOSE BLDC GLUCOMTR-MCNC: 135 MG/DL — HIGH (ref 70–99)
GLUCOSE BLDC GLUCOMTR-MCNC: 135 MG/DL — HIGH (ref 70–99)
GLUCOSE BLDC GLUCOMTR-MCNC: 151 MG/DL — HIGH (ref 70–99)
GLUCOSE BLDC GLUCOMTR-MCNC: 151 MG/DL — HIGH (ref 70–99)
GLUCOSE BLDC GLUCOMTR-MCNC: 158 MG/DL — HIGH (ref 70–99)
GLUCOSE SERPL-MCNC: 141 MG/DL — HIGH (ref 70–99)
GLUCOSE SERPL-MCNC: 141 MG/DL — HIGH (ref 70–99)
HCT VFR BLD CALC: 29.8 % — LOW (ref 34.5–45)
HCT VFR BLD CALC: 29.8 % — LOW (ref 34.5–45)
HGB BLD-MCNC: 10.7 G/DL — LOW (ref 11.5–15.5)
HGB BLD-MCNC: 10.7 G/DL — LOW (ref 11.5–15.5)
IMM GRANULOCYTES NFR BLD AUTO: 0.4 % — SIGNIFICANT CHANGE UP (ref 0–0.9)
IMM GRANULOCYTES NFR BLD AUTO: 0.4 % — SIGNIFICANT CHANGE UP (ref 0–0.9)
LYMPHOCYTES # BLD AUTO: 1.24 K/UL — SIGNIFICANT CHANGE UP (ref 1–3.3)
LYMPHOCYTES # BLD AUTO: 1.24 K/UL — SIGNIFICANT CHANGE UP (ref 1–3.3)
LYMPHOCYTES # BLD AUTO: 9.7 % — LOW (ref 13–44)
LYMPHOCYTES # BLD AUTO: 9.7 % — LOW (ref 13–44)
MAGNESIUM SERPL-MCNC: 2 MG/DL — SIGNIFICANT CHANGE UP (ref 1.6–2.6)
MAGNESIUM SERPL-MCNC: 2 MG/DL — SIGNIFICANT CHANGE UP (ref 1.6–2.6)
MCHC RBC-ENTMCNC: 30.7 PG — SIGNIFICANT CHANGE UP (ref 27–34)
MCHC RBC-ENTMCNC: 30.7 PG — SIGNIFICANT CHANGE UP (ref 27–34)
MCHC RBC-ENTMCNC: 35.9 GM/DL — SIGNIFICANT CHANGE UP (ref 32–36)
MCHC RBC-ENTMCNC: 35.9 GM/DL — SIGNIFICANT CHANGE UP (ref 32–36)
MCV RBC AUTO: 85.4 FL — SIGNIFICANT CHANGE UP (ref 80–100)
MCV RBC AUTO: 85.4 FL — SIGNIFICANT CHANGE UP (ref 80–100)
MONOCYTES # BLD AUTO: 0.83 K/UL — SIGNIFICANT CHANGE UP (ref 0–0.9)
MONOCYTES # BLD AUTO: 0.83 K/UL — SIGNIFICANT CHANGE UP (ref 0–0.9)
MONOCYTES NFR BLD AUTO: 6.5 % — SIGNIFICANT CHANGE UP (ref 2–14)
MONOCYTES NFR BLD AUTO: 6.5 % — SIGNIFICANT CHANGE UP (ref 2–14)
NEUTROPHILS # BLD AUTO: 10.63 K/UL — HIGH (ref 1.8–7.4)
NEUTROPHILS # BLD AUTO: 10.63 K/UL — HIGH (ref 1.8–7.4)
NEUTROPHILS NFR BLD AUTO: 83.1 % — HIGH (ref 43–77)
NEUTROPHILS NFR BLD AUTO: 83.1 % — HIGH (ref 43–77)
NRBC # BLD: 0 /100 WBCS — SIGNIFICANT CHANGE UP (ref 0–0)
NRBC # BLD: 0 /100 WBCS — SIGNIFICANT CHANGE UP (ref 0–0)
PHOSPHATE SERPL-MCNC: 1.9 MG/DL — LOW (ref 2.5–4.5)
PHOSPHATE SERPL-MCNC: 1.9 MG/DL — LOW (ref 2.5–4.5)
PLATELET # BLD AUTO: 150 K/UL — SIGNIFICANT CHANGE UP (ref 150–400)
PLATELET # BLD AUTO: 150 K/UL — SIGNIFICANT CHANGE UP (ref 150–400)
POTASSIUM SERPL-MCNC: 4.1 MMOL/L — SIGNIFICANT CHANGE UP (ref 3.5–5.3)
POTASSIUM SERPL-MCNC: 4.1 MMOL/L — SIGNIFICANT CHANGE UP (ref 3.5–5.3)
POTASSIUM SERPL-SCNC: 4.1 MMOL/L — SIGNIFICANT CHANGE UP (ref 3.5–5.3)
POTASSIUM SERPL-SCNC: 4.1 MMOL/L — SIGNIFICANT CHANGE UP (ref 3.5–5.3)
PROT SERPL-MCNC: 5.5 G/DL — LOW (ref 6–8.3)
PROT SERPL-MCNC: 5.5 G/DL — LOW (ref 6–8.3)
RBC # BLD: 3.49 M/UL — LOW (ref 3.8–5.2)
RBC # BLD: 3.49 M/UL — LOW (ref 3.8–5.2)
RBC # FLD: 13.9 % — SIGNIFICANT CHANGE UP (ref 10.3–14.5)
RBC # FLD: 13.9 % — SIGNIFICANT CHANGE UP (ref 10.3–14.5)
SODIUM SERPL-SCNC: 140 MMOL/L — SIGNIFICANT CHANGE UP (ref 135–145)
SODIUM SERPL-SCNC: 140 MMOL/L — SIGNIFICANT CHANGE UP (ref 135–145)
WBC # BLD: 12.78 K/UL — HIGH (ref 3.8–10.5)
WBC # BLD: 12.78 K/UL — HIGH (ref 3.8–10.5)
WBC # FLD AUTO: 12.78 K/UL — HIGH (ref 3.8–10.5)
WBC # FLD AUTO: 12.78 K/UL — HIGH (ref 3.8–10.5)

## 2023-12-31 PROCEDURE — 71045 X-RAY EXAM CHEST 1 VIEW: CPT | Mod: 26

## 2023-12-31 PROCEDURE — 99232 SBSQ HOSP IP/OBS MODERATE 35: CPT

## 2023-12-31 RX ORDER — HYDROMORPHONE HYDROCHLORIDE 2 MG/ML
0.25 INJECTION INTRAMUSCULAR; INTRAVENOUS; SUBCUTANEOUS ONCE
Refills: 0 | Status: DISCONTINUED | OUTPATIENT
Start: 2023-12-31 | End: 2023-12-31

## 2023-12-31 RX ORDER — AMLODIPINE BESYLATE 2.5 MG/1
2.5 TABLET ORAL DAILY
Refills: 0 | Status: DISCONTINUED | OUTPATIENT
Start: 2023-12-31 | End: 2024-01-03

## 2023-12-31 RX ORDER — ACETAMINOPHEN 500 MG
1000 TABLET ORAL ONCE
Refills: 0 | Status: COMPLETED | OUTPATIENT
Start: 2023-12-31 | End: 2023-12-31

## 2023-12-31 RX ORDER — SODIUM CHLORIDE 9 MG/ML
3 INJECTION INTRAMUSCULAR; INTRAVENOUS; SUBCUTANEOUS EVERY 8 HOURS
Refills: 0 | Status: DISCONTINUED | OUTPATIENT
Start: 2023-12-31 | End: 2024-01-03

## 2023-12-31 RX ADMIN — ATORVASTATIN CALCIUM 80 MILLIGRAM(S): 80 TABLET, FILM COATED ORAL at 21:13

## 2023-12-31 RX ADMIN — Medication 2: at 08:06

## 2023-12-31 RX ADMIN — MUPIROCIN 1 APPLICATION(S): 20 OINTMENT TOPICAL at 05:57

## 2023-12-31 RX ADMIN — Medication 81 MILLIGRAM(S): at 12:29

## 2023-12-31 RX ADMIN — MUPIROCIN 1 APPLICATION(S): 20 OINTMENT TOPICAL at 17:02

## 2023-12-31 RX ADMIN — Medication 1000 MILLIGRAM(S): at 13:00

## 2023-12-31 RX ADMIN — Medication 500 MILLIGRAM(S): at 17:02

## 2023-12-31 RX ADMIN — Medication 10 MILLIGRAM(S): at 05:54

## 2023-12-31 RX ADMIN — Medication 650 MILLIGRAM(S): at 05:54

## 2023-12-31 RX ADMIN — Medication 12.5 MILLIGRAM(S): at 06:01

## 2023-12-31 RX ADMIN — Medication 100 MILLIGRAM(S): at 06:05

## 2023-12-31 RX ADMIN — Medication 10 MILLIGRAM(S): at 14:00

## 2023-12-31 RX ADMIN — HYDROMORPHONE HYDROCHLORIDE 0.25 MILLIGRAM(S): 2 INJECTION INTRAMUSCULAR; INTRAVENOUS; SUBCUTANEOUS at 16:30

## 2023-12-31 RX ADMIN — SODIUM CHLORIDE 3 MILLILITER(S): 9 INJECTION INTRAMUSCULAR; INTRAVENOUS; SUBCUTANEOUS at 21:06

## 2023-12-31 RX ADMIN — Medication 650 MILLIGRAM(S): at 00:09

## 2023-12-31 RX ADMIN — AMLODIPINE BESYLATE 2.5 MILLIGRAM(S): 2.5 TABLET ORAL at 12:29

## 2023-12-31 RX ADMIN — Medication 650 MILLIGRAM(S): at 17:02

## 2023-12-31 RX ADMIN — Medication 400 MILLIGRAM(S): at 12:29

## 2023-12-31 RX ADMIN — ENOXAPARIN SODIUM 40 MILLIGRAM(S): 100 INJECTION SUBCUTANEOUS at 15:00

## 2023-12-31 RX ADMIN — HYDROMORPHONE HYDROCHLORIDE 0.25 MILLIGRAM(S): 2 INJECTION INTRAMUSCULAR; INTRAVENOUS; SUBCUTANEOUS at 16:00

## 2023-12-31 RX ADMIN — PANTOPRAZOLE SODIUM 40 MILLIGRAM(S): 20 TABLET, DELAYED RELEASE ORAL at 05:55

## 2023-12-31 RX ADMIN — Medication 12.5 MILLIGRAM(S): at 17:02

## 2023-12-31 RX ADMIN — CHLORHEXIDINE GLUCONATE 1 APPLICATION(S): 213 SOLUTION TOPICAL at 12:32

## 2023-12-31 RX ADMIN — Medication 500 MILLIGRAM(S): at 05:54

## 2023-12-31 RX ADMIN — POLYETHYLENE GLYCOL 3350 17 GRAM(S): 17 POWDER, FOR SOLUTION ORAL at 12:31

## 2023-12-31 RX ADMIN — Medication 650 MILLIGRAM(S): at 06:13

## 2023-12-31 RX ADMIN — Medication 85 MILLIMOLE(S): at 01:31

## 2023-12-31 NOTE — PROGRESS NOTE ADULT - SUBJECTIVE AND OBJECTIVE BOX
VITAL SIGNS-Telemetry:  SR 70s  Vital Signs Last 24 Hrs  T(C): 36.7 (23 @ 12:00), Max: 36.8 (23 @ 20:00)  T(F): 98 (23 @ 12:00), Max: 98.3 (23 @ 00:00)  HR: 81 (23 @ 14:00) (66 - 81)  BP: 117/57 (23 @ 14:00) (108/63 - 137/60)  RR: 31 (23 @ 14:00) (10 - 38)  SpO2: 91% (23 @ 14:00) (91% - 100%)          @ 07:01  -   @ 07:00  --------------------------------------------------------  IN: 2229.8 mL / OUT: 1845 mL / NET: 384.8 mL     @ 07:01  -   @ 16:29  --------------------------------------------------------  IN: 550 mL / OUT: 1080 mL / NET: -530 mL    Daily     Daily Weight in k.7 (31 Dec 2023 00:00)        Drains:     MS         [ x ] Drainage:                 L Pleural  [ x ]  Drainage:                R Pleural  [ x ]  Drainage:  Pacing Wires        [x  ]   Settings:        vvi 50/10                          Isolated  [  ]    PHYSICAL EXAM:  Neurology: alert and oriented x 3, nonfocal, no gross deficits  CV : S1S2  Sternal Wound :  CDI , Stable  Lungs: cta  Abdomen: soft, nontender, nondistended, positive bowel sounds, last bowel movement         Extremities:  tr. edema,   l hand swelling with improvement ,        acetaminophen     Tablet .. 650 milliGRAM(s) Oral every 6 hours  amLODIPine   Tablet 2.5 milliGRAM(s) Oral daily  ascorbic acid 500 milliGRAM(s) Oral two times a day  aspirin enteric coated 81 milliGRAM(s) Oral daily  atorvastatin 80 milliGRAM(s) Oral at bedtime  chlorhexidine 2% Cloths 1 Application(s) Topical daily  chlorhexidine 4% Liquid 1 Application(s) Topical daily  dextrose 50% Injectable 50 milliLiter(s) IV Push every 15 minutes  enoxaparin Injectable 40 milliGRAM(s) SubCutaneous every 24 hours  insulin lispro (ADMELOG) corrective regimen sliding scale   SubCutaneous Before meals and at bedtime  metoprolol tartrate 12.5 milliGRAM(s) Oral two times a day  mupirocin 2% Nasal 1 Application(s) Both Nostrils every 12 hours  ondansetron Injectable 4 milliGRAM(s) IV Push every 6 hours PRN  pantoprazole    Tablet 40 milliGRAM(s) Oral before breakfast  polyethylene glycol 3350 17 Gram(s) Oral daily  senna 2 Tablet(s) Oral at bedtime  sodium chloride 0.9% lock flush 3 milliLiter(s) IV Push every 8 hours  sodium chloride 0.9%. 1000 milliLiter(s) IV Continuous <Continuous>      Physical Therapy Rec:   Home  [x  ]   Home w/ PT  [  ]  Rehab  [  ]  Discussed with Cardiothoracic Team at AM rounds.

## 2023-12-31 NOTE — PROGRESS NOTE ADULT - ASSESSMENT
Overall stable s/p CABG chesty tube still in place. patient is hemodynamically stable post CABG    continue as per CT surgery  continue present meds  transfer to 2 saha post hest tube removal as per CT surgery

## 2023-12-31 NOTE — PROGRESS NOTE ADULT - SUBJECTIVE AND OBJECTIVE BOX
Patient seen and examined at the bedside.    Remained critically ill on continuous ICU monitoring.    OBJECTIVE:  Vital Signs Last 24 Hrs  T(C): 36.7 (31 Dec 2023 08:00), Max: 36.8 (30 Dec 2023 20:00)  T(F): 98.1 (31 Dec 2023 08:00), Max: 98.3 (31 Dec 2023 00:00)  HR: 73 (31 Dec 2023 08:00) (66 - 89)  BP: --  BP(mean): --  RR: 22 (31 Dec 2023 08:00) (10 - 38)  SpO2: 100% (31 Dec 2023 08:00) (92% - 100%)    Parameters below as of 31 Dec 2023 08:00  Patient On (Oxygen Delivery Method): nasal cannula  O2 Flow (L/min): 2        Physical Exam:   General: OOBTC, Ambulating in saez  Neurology: interactive, alert, oriented   Eyes: bilateral pupils equal and reactive   ENT/Neck: Neck supple, trachea midline, No JVD   Respiratory: Clear bilaterally   CV: S1S2, no murmurs        [x] Sternal dressing, [x] Mediastinal CT, [x] Bilateral Pleural CTs        [x] Sinus rhythm, [x] Temporary pacing VVI - 50   Abdominal: Soft, NT, ND +BS   Extremities: 1+ pedal edema noted, + peripheral pulses, ace bandage to L arm with minimal swelling + ulnar and palmar pulses    Skin: No Rashes, Hematoma, Ecchymosis                           Assessment:  59 yo F with PMHx of HTN, HLD,  cardiac stents-2, second hand smoke exposure.     CAD s/p CABG x2 on 12/29/23  Hemodynamic instability  Post op respiratory insufficiency  Acute blood loss anemia  Stress hyperglycemia     Today:  - OOBTC  - ambulated   - will remove R Pl CT     Plan:   ***Neuro***  [x] Nonfocal   Post operative neuro assessment   Pain management with Tylenol and prns    ***Cardiovascular***  Invasive hemodynamic monitoring, assess perfusion indices   SR / CVP 3/ MAP 74/  Hct 29.8/ Lactate 1.0  Reassessment of hemodynamics post resuscitation   Monitor chest tube outputs   Lopressor for BP control   [x] VTE ppx with Lovenox   [x]  ASA [x] Statin   Serial EKG and cardiac enzymes     ***Pulmonary***  [x] NC 2L   Titration of FiO2 and PEEP, follow SpO2, CXR, blood gasses   tolerating nasal cannula, wean as tolerated             ***GI***  [x] Tolerating regular CC diet  [x] Protonix for stress ulcer ppx  Bowel regimen with Miralax and Senna   Reglan for gut motility   Zofran for nausea     ***Renal***  Continue to monitor I/Os, BUN/Creatinine.   Replete lytes PRN    ***ID***  Perioperative coverage with Cefuroxime     ***Endocrine***  [x] Stress Hyperglycemia : HbA1c 5.6%                - [x] ISS              - Need tight glycemic control to prevent wound infection.            Patient requires continuous monitoring with bedside rhythm monitoring, pulse oximetry monitoring, and continuous central venous and arterial pressure monitoring; and intermittent blood gas analysis. Care plan discussed with the ICU care team.   Patient remained critical, at risk for life threatening decompensation.    I have spent 30 minutes providing critical care management to this patient.    By signing my name below, I, Soraya Rider, attest that this documentation has been prepared under the direction and in the presence of SERGIO Beyer   Electronically signed: Lenin Marquez, 12-31-23 @ 09:31    I, Magdy Alfaro, personally performed the services described in this documentation. all medical record entries made by the scribe were at my direction and in my presence. I have reviewed the chart and agree that the record reflects my personal performance and is accurate and complete  Electronically signed: SERGIO Beyer  Patient seen and examined at the bedside.    Remained critically ill on continuous ICU monitoring.    OBJECTIVE:  Vital Signs Last 24 Hrs  T(C): 36.7 (31 Dec 2023 08:00), Max: 36.8 (30 Dec 2023 20:00)  T(F): 98.1 (31 Dec 2023 08:00), Max: 98.3 (31 Dec 2023 00:00)  HR: 73 (31 Dec 2023 08:00) (66 - 89)  RR: 22 (31 Dec 2023 08:00) (10 - 38)  SpO2: 100% (31 Dec 2023 08:00) (92% - 100%)    Parameters below as of 31 Dec 2023 08:00  Patient On (Oxygen Delivery Method): nasal cannula  O2 Flow (L/min): 2        Physical Exam:   General: OOBTC, Ambulating in saez  Neurology: interactive, alert, oriented   Eyes: bilateral pupils equal and reactive   ENT/Neck: Neck supple, trachea midline, No JVD   Respiratory: Clear bilaterally   CV: S1S2, no murmurs        [x] Sternal dressing, [x] Mediastinal CT, [x] Bilateral Pleural CTs        [x] Sinus rhythm, [x] Temporary pacing VVI - 50   Abdominal: Soft, NT, ND +BS   Extremities: 1+ pedal edema noted, + peripheral pulses, ace bandage to L arm with minimal swelling + ulnar and palmar pulses    Skin: No Rashes, Hematoma, Ecchymosis                           Assessment:  59 yo F with PMHx of HTN, HLD,  cardiac stents-2, second hand smoke exposure.     CAD s/p CABG x2 on 12/29/23  Hemodynamic instability  Post op respiratory insufficiency  Acute blood loss anemia  Stress hyperglycemia     Today:  - OOBTC  - ambulated   - move to the floor      Plan:   ***Neuro***  [x] Nonfocal   Post operative neuro assessment   Pain management with Tylenol and prns    ***Cardiovascular***  Invasive hemodynamic monitoring, assess perfusion indices   SR / CVP 3/ MAP 74/  Hct 29.8/ Lactate 1.0  Reassessment of hemodynamics post resuscitation   Monitor chest tube outputs   Lopressor for BP control   [x] VTE ppx with Lovenox   [x]  ASA [x] Statin       ***Pulmonary***  [x] NC 2L   Titration of FiO2 and PEEP, follow SpO2, CXR, blood gasses   tolerating nasal cannula, wean as tolerated             ***GI***  [x] Tolerating regular CC diet  [x] Protonix for stress ulcer ppx  Bowel regimen with Miralax and Senna   Reglan for gut motility   Zofran for nausea     ***Renal***  Continue to monitor I/Os, BUN/Creatinine.   Replete lytes PRN    ***ID***  Perioperative coverage with Cefuroxime     ***Endocrine***  [x] Stress Hyperglycemia : HbA1c 5.6%                - [x] ISS              - Need tight glycemic control to prevent wound infection.            Patient requires continuous monitoring with bedside rhythm monitoring, pulse oximetry monitoring, and continuous central venous and arterial pressure monitoring; and intermittent blood gas analysis. Care plan discussed with the ICU care team.   Patient remained critical, at risk for life threatening decompensation.    I have spent 30 minutes providing critical care management to this patient.    By signing my name below, I, Soraya Rider, attest that this documentation has been prepared under the direction and in the presence of SERGIO Beyer   Electronically signed: Iram Marquez, 12-31-23 @ 09:31    I, Magdy Alfaro, personally performed the services described in this documentation. all medical record entries made by the iram were at my direction and in my presence. I have reviewed the chart and agree that the record reflects my personal performance and is accurate and complete  Electronically signed: SERGIO Beyer  Patient seen and examined at the bedside.    Remained critically ill on continuous ICU monitoring.    OBJECTIVE:  Vital Signs Last 24 Hrs  T(C): 36.7 (31 Dec 2023 08:00), Max: 36.8 (30 Dec 2023 20:00)  T(F): 98.1 (31 Dec 2023 08:00), Max: 98.3 (31 Dec 2023 00:00)  HR: 73 (31 Dec 2023 08:00) (66 - 89)  RR: 22 (31 Dec 2023 08:00) (10 - 38)  SpO2: 100% (31 Dec 2023 08:00) (92% - 100%)    Parameters below as of 31 Dec 2023 08:00  Patient On (Oxygen Delivery Method): nasal cannula  O2 Flow (L/min): 2        Physical Exam:   General: OOBTC, Ambulating in saez  Neurology: interactive, alert, oriented   Eyes: bilateral pupils equal and reactive   ENT/Neck: Neck supple, trachea midline, No JVD   Respiratory: Clear bilaterally   CV: S1S2, no murmurs        [x] Sternal dressing, [x] Mediastinal CT, [x] Bilateral Pleural CTs        [x] Sinus rhythm, [x] Temporary pacing VVI - 50   Abdominal: Soft, NT, ND +BS   Extremities: 1+ pedal edema noted, + peripheral pulses, ace bandage to L arm with minimal swelling + ulnar and palmar pulses    Skin: No Rashes, Hematoma, Ecchymosis                           Assessment:  57 yo F with PMHx of HTN, HLD,  cardiac stents-2, second hand smoke exposure.     CAD s/p CABG x2 on 12/29/23  Hemodynamic instability  Post op respiratory insufficiency  Acute blood loss anemia  Stress hyperglycemia     Today:  - OOBTC  - ambulated   - move to the floor      Plan:   ***Neuro***  [x] Nonfocal   Post operative neuro assessment   Pain management with Tylenol and prns    ***Cardiovascular***  Invasive hemodynamic monitoring, assess perfusion indices   SR / CVP 3/ MAP 74/  Hct 29.8/ Lactate 1.0  Reassessment of hemodynamics post resuscitation   Monitor chest tube outputs   Lopressor for BP control   [x] VTE ppx with Lovenox   [x]  ASA [x] Statin       ***Pulmonary***  [x] NC 2L   Titration of FiO2 and PEEP, follow SpO2, CXR, blood gasses   tolerating nasal cannula, wean as tolerated             ***GI***  [x] Tolerating regular CC diet  [x] Protonix for stress ulcer ppx  Bowel regimen with Miralax and Senna   Reglan for gut motility   Zofran for nausea     ***Renal***  Continue to monitor I/Os, BUN/Creatinine.   Replete lytes PRN    ***ID***  Perioperative coverage with Cefuroxime     ***Endocrine***  [x] Stress Hyperglycemia : HbA1c 5.6%                - [x] ISS              - Need tight glycemic control to prevent wound infection.            Patient requires continuous monitoring with bedside rhythm monitoring, pulse oximetry monitoring, and continuous central venous and arterial pressure monitoring; and intermittent blood gas analysis. Care plan discussed with the ICU care team.   Patient remained critical, at risk for life threatening decompensation.    I have spent 30 minutes providing critical care management to this patient.    By signing my name below, I, Soraya Rider, attest that this documentation has been prepared under the direction and in the presence of SERGIO Beyer   Electronically signed: Iram Marquez, 12-31-23 @ 09:31    I, Magdy Alfaro, personally performed the services described in this documentation. all medical record entries made by the iram were at my direction and in my presence. I have reviewed the chart and agree that the record reflects my personal performance and is accurate and complete  Electronically signed: SERGIO Beyer

## 2023-12-31 NOTE — PROGRESS NOTE ADULT - SUBJECTIVE AND OBJECTIVE BOX
Subjective: Patient seen and examined. No new events except as noted.   POD 2 s/p CABG  in CTICU  extubated awake alert  denises sob feels weak and fataigued chest wall pain  MEDICATIONS:  MEDICATIONS  (STANDING):  acetaminophen     Tablet .. 650 milliGRAM(s) Oral every 6 hours  amLODIPine   Tablet 2.5 milliGRAM(s) Oral daily  ascorbic acid 500 milliGRAM(s) Oral two times a day  aspirin enteric coated 81 milliGRAM(s) Oral daily  atorvastatin 80 milliGRAM(s) Oral at bedtime  bisacodyl Suppository 10 milliGRAM(s) Rectal once  chlorhexidine 2% Cloths 1 Application(s) Topical daily  chlorhexidine 4% Liquid 1 Application(s) Topical daily  dextrose 50% Injectable 50 milliLiter(s) IV Push every 15 minutes  enoxaparin Injectable 40 milliGRAM(s) SubCutaneous every 24 hours  insulin lispro (ADMELOG) corrective regimen sliding scale   SubCutaneous Before meals and at bedtime  metoclopramide Injectable 10 milliGRAM(s) IV Push every 8 hours  metoprolol tartrate 12.5 milliGRAM(s) Oral two times a day  mupirocin 2% Nasal 1 Application(s) Both Nostrils every 12 hours  pantoprazole    Tablet 40 milliGRAM(s) Oral before breakfast  polyethylene glycol 3350 17 Gram(s) Oral daily  potassium chloride  10 mEq/50 mL IVPB 10 milliEquivalent(s) IV Intermittent every 1 hour  potassium chloride  10 mEq/50 mL IVPB 10 milliEquivalent(s) IV Intermittent every 1 hour  potassium chloride  10 mEq/50 mL IVPB 10 milliEquivalent(s) IV Intermittent every 1 hour  senna 2 Tablet(s) Oral at bedtime  sodium chloride 0.9%. 1000 milliLiter(s) (10 mL/Hr) IV Continuous <Continuous>      PHYSICAL EXAM:  T(C): 36.7 (12-31-23 @ 12:00), Max: 36.8 (12-30-23 @ 20:00)  HR: 74 (12-31-23 @ 13:00) (66 - 89)  BP: 123/65 (12-31-23 @ 13:00) (108/63 - 137/60)  RR: 17 (12-31-23 @ 13:00) (10 - 38)  SpO2: 93% (12-31-23 @ 13:00) (92% - 100%)  Wt(kg): --  I&O's Summary    30 Dec 2023 07:01  -  31 Dec 2023 07:00  --------------------------------------------------------  IN: 2229.8 mL / OUT: 1845 mL / NET: 384.8 mL    31 Dec 2023 07:01  -  31 Dec 2023 13:47  --------------------------------------------------------  IN: 550 mL / OUT: 1080 mL / NET: -530 mL          Appearance: Normal awake alert sleepy but in reasonable spirits	  HEENT:   Normal oral mucosa, PERRL, EOMI	  Cardiovascular: Normal S1 S2, No JVD, No murmurs ,no rub  Respiratory: Lungs clear to auscultation, normal effort decreaased BS	  Gastrointestinal:  Soft, Non-tender, + BS	  Skin: No rashes, No ecchymoses, No cyanosis, warm to touch  Musculoskeletal: Normal range of motion, normal strength  Psychiatry:  Mood & affect appropriate  Ext: mild edema        LABS:    CARDIAC MARKERS:  CARDIAC MARKERS ( 29 Dec 2023 13:59 )  x     / x     / 243 U/L / x     / 14.4 ng/mL                                10.7   12.78 )-----------( 150      ( 31 Dec 2023 00:26 )             29.8     12-31    140  |  107  |  16  ----------------------------<  141<H>  4.1   |  26  |  0.85    Ca    8.8      31 Dec 2023 00:29  Phos  1.9     12-31  Mg     2.0     12-31    TPro  5.5<L>  /  Alb  3.8  /  TBili  1.4<H>  /  DBili  x   /  AST  49<H>  /  ALT  46<H>  /  AlkPhos  44  12-31    proBNP:   Lipid Profile:   HgA1c:   TSH:           TELEMETRY: 	    ECG:  	NSR  RADIOLOGY:   DIAGNOSTIC TESTING:  [ ] Echocardiogram:  [ ]  Catheterization:  [ ] Stress Test:    OTHER: 	      < from: Xray Chest 1 View- PORTABLE-Routine (12.30.23 @ 02:37) >  Findings/  Impression: Status post open heart surgery. There are chest tubes and   mediastinal drains. Right IJ catheter tip at the SVC. There is a coronary   stent. Cardiomegaly. Left base subsegmental atelectasis. Left midlung   patchy opacity may represent pneumonia.    --- End of Report ---    < end of copied text >

## 2023-12-31 NOTE — PROGRESS NOTE ADULT - ASSESSMENT
58 Fw/ pmh of htn, hld, cardiac stents-2, second hand smoke exposure presents today for elective cardiac catheterization at [facility] with . The patient c/o midsternal chest pain, aggravate with exertion.  pt transferred to [facility] for OHS - CABG eval with Dr. Valdez. Pt denies cp/ sob at this time.     On 12/29/23, pt . underwent CABG x 2 w/ LIMA/L radial  bleeding post-op requiring multiple products including 10 cryo  paced post-op now SR  l femeral meenakshi d/c'd 12/31 - tr. to floor-  will d/c all CTs tomorrow if output down  12/30 mitchell out voiding  bb/statin/  norvasc for radial artery  dvt prophylaxis  d/c plan home wed/thur 58 Fw/ pmh of htn, hld, cardiac stents-2, second hand smoke exposure presents today for elective cardiac catheterization at [facility] with . The patient c/o midsternal chest pain, aggravate with exertion.  pt transferred to [facility] for OHS - CABG eval with Dr. Valdez. Pt denies cp/ sob at this time.     On 12/29/23, pt . underwent CABG x 2 w/ LIMA/L radial  bleeding post-op requiring multiple products including 10 cryo  paced post-op now SR  l femeral meenakshi d/c'd 12/31 - tr. to floor-  will d/c all CTs tomorrow if output down  12/30 mitchell out voiding  bb/statin/  norvasc for radial artery  dvt prophylaxis  d/c plan home wed/thur

## 2024-01-01 LAB
ANION GAP SERPL CALC-SCNC: 8 MMOL/L — SIGNIFICANT CHANGE UP (ref 5–17)
ANION GAP SERPL CALC-SCNC: 8 MMOL/L — SIGNIFICANT CHANGE UP (ref 5–17)
BASOPHILS # BLD AUTO: 0.03 K/UL — SIGNIFICANT CHANGE UP (ref 0–0.2)
BASOPHILS # BLD AUTO: 0.03 K/UL — SIGNIFICANT CHANGE UP (ref 0–0.2)
BASOPHILS NFR BLD AUTO: 0.2 % — SIGNIFICANT CHANGE UP (ref 0–2)
BASOPHILS NFR BLD AUTO: 0.2 % — SIGNIFICANT CHANGE UP (ref 0–2)
BUN SERPL-MCNC: 14 MG/DL — SIGNIFICANT CHANGE UP (ref 7–23)
BUN SERPL-MCNC: 14 MG/DL — SIGNIFICANT CHANGE UP (ref 7–23)
CALCIUM SERPL-MCNC: 9 MG/DL — SIGNIFICANT CHANGE UP (ref 8.4–10.5)
CALCIUM SERPL-MCNC: 9 MG/DL — SIGNIFICANT CHANGE UP (ref 8.4–10.5)
CHLORIDE SERPL-SCNC: 103 MMOL/L — SIGNIFICANT CHANGE UP (ref 96–108)
CHLORIDE SERPL-SCNC: 103 MMOL/L — SIGNIFICANT CHANGE UP (ref 96–108)
CO2 SERPL-SCNC: 28 MMOL/L — SIGNIFICANT CHANGE UP (ref 22–31)
CO2 SERPL-SCNC: 28 MMOL/L — SIGNIFICANT CHANGE UP (ref 22–31)
CREAT SERPL-MCNC: 0.73 MG/DL — SIGNIFICANT CHANGE UP (ref 0.5–1.3)
CREAT SERPL-MCNC: 0.73 MG/DL — SIGNIFICANT CHANGE UP (ref 0.5–1.3)
EGFR: 96 ML/MIN/1.73M2 — SIGNIFICANT CHANGE UP
EGFR: 96 ML/MIN/1.73M2 — SIGNIFICANT CHANGE UP
EOSINOPHIL # BLD AUTO: 0.06 K/UL — SIGNIFICANT CHANGE UP (ref 0–0.5)
EOSINOPHIL # BLD AUTO: 0.06 K/UL — SIGNIFICANT CHANGE UP (ref 0–0.5)
EOSINOPHIL NFR BLD AUTO: 0.5 % — SIGNIFICANT CHANGE UP (ref 0–6)
EOSINOPHIL NFR BLD AUTO: 0.5 % — SIGNIFICANT CHANGE UP (ref 0–6)
GLUCOSE SERPL-MCNC: 115 MG/DL — HIGH (ref 70–99)
GLUCOSE SERPL-MCNC: 115 MG/DL — HIGH (ref 70–99)
HCT VFR BLD CALC: 33.1 % — LOW (ref 34.5–45)
HCT VFR BLD CALC: 33.1 % — LOW (ref 34.5–45)
HGB BLD-MCNC: 11.5 G/DL — SIGNIFICANT CHANGE UP (ref 11.5–15.5)
HGB BLD-MCNC: 11.5 G/DL — SIGNIFICANT CHANGE UP (ref 11.5–15.5)
IMM GRANULOCYTES NFR BLD AUTO: 0.6 % — SIGNIFICANT CHANGE UP (ref 0–0.9)
IMM GRANULOCYTES NFR BLD AUTO: 0.6 % — SIGNIFICANT CHANGE UP (ref 0–0.9)
LYMPHOCYTES # BLD AUTO: 1.98 K/UL — SIGNIFICANT CHANGE UP (ref 1–3.3)
LYMPHOCYTES # BLD AUTO: 1.98 K/UL — SIGNIFICANT CHANGE UP (ref 1–3.3)
LYMPHOCYTES # BLD AUTO: 16.3 % — SIGNIFICANT CHANGE UP (ref 13–44)
LYMPHOCYTES # BLD AUTO: 16.3 % — SIGNIFICANT CHANGE UP (ref 13–44)
MCHC RBC-ENTMCNC: 30.7 PG — SIGNIFICANT CHANGE UP (ref 27–34)
MCHC RBC-ENTMCNC: 30.7 PG — SIGNIFICANT CHANGE UP (ref 27–34)
MCHC RBC-ENTMCNC: 34.7 GM/DL — SIGNIFICANT CHANGE UP (ref 32–36)
MCHC RBC-ENTMCNC: 34.7 GM/DL — SIGNIFICANT CHANGE UP (ref 32–36)
MCV RBC AUTO: 88.3 FL — SIGNIFICANT CHANGE UP (ref 80–100)
MCV RBC AUTO: 88.3 FL — SIGNIFICANT CHANGE UP (ref 80–100)
MONOCYTES # BLD AUTO: 0.69 K/UL — SIGNIFICANT CHANGE UP (ref 0–0.9)
MONOCYTES # BLD AUTO: 0.69 K/UL — SIGNIFICANT CHANGE UP (ref 0–0.9)
MONOCYTES NFR BLD AUTO: 5.7 % — SIGNIFICANT CHANGE UP (ref 2–14)
MONOCYTES NFR BLD AUTO: 5.7 % — SIGNIFICANT CHANGE UP (ref 2–14)
NEUTROPHILS # BLD AUTO: 9.32 K/UL — HIGH (ref 1.8–7.4)
NEUTROPHILS # BLD AUTO: 9.32 K/UL — HIGH (ref 1.8–7.4)
NEUTROPHILS NFR BLD AUTO: 76.7 % — SIGNIFICANT CHANGE UP (ref 43–77)
NEUTROPHILS NFR BLD AUTO: 76.7 % — SIGNIFICANT CHANGE UP (ref 43–77)
NRBC # BLD: 0 /100 WBCS — SIGNIFICANT CHANGE UP (ref 0–0)
NRBC # BLD: 0 /100 WBCS — SIGNIFICANT CHANGE UP (ref 0–0)
PLATELET # BLD AUTO: 157 K/UL — SIGNIFICANT CHANGE UP (ref 150–400)
PLATELET # BLD AUTO: 157 K/UL — SIGNIFICANT CHANGE UP (ref 150–400)
POTASSIUM SERPL-MCNC: 4 MMOL/L — SIGNIFICANT CHANGE UP (ref 3.5–5.3)
POTASSIUM SERPL-MCNC: 4 MMOL/L — SIGNIFICANT CHANGE UP (ref 3.5–5.3)
POTASSIUM SERPL-SCNC: 4 MMOL/L — SIGNIFICANT CHANGE UP (ref 3.5–5.3)
POTASSIUM SERPL-SCNC: 4 MMOL/L — SIGNIFICANT CHANGE UP (ref 3.5–5.3)
RBC # BLD: 3.75 M/UL — LOW (ref 3.8–5.2)
RBC # BLD: 3.75 M/UL — LOW (ref 3.8–5.2)
RBC # FLD: 13.7 % — SIGNIFICANT CHANGE UP (ref 10.3–14.5)
RBC # FLD: 13.7 % — SIGNIFICANT CHANGE UP (ref 10.3–14.5)
SODIUM SERPL-SCNC: 139 MMOL/L — SIGNIFICANT CHANGE UP (ref 135–145)
SODIUM SERPL-SCNC: 139 MMOL/L — SIGNIFICANT CHANGE UP (ref 135–145)
WBC # BLD: 12.15 K/UL — HIGH (ref 3.8–10.5)
WBC # BLD: 12.15 K/UL — HIGH (ref 3.8–10.5)
WBC # FLD AUTO: 12.15 K/UL — HIGH (ref 3.8–10.5)
WBC # FLD AUTO: 12.15 K/UL — HIGH (ref 3.8–10.5)

## 2024-01-01 PROCEDURE — 71045 X-RAY EXAM CHEST 1 VIEW: CPT | Mod: 26

## 2024-01-01 PROCEDURE — 71045 X-RAY EXAM CHEST 1 VIEW: CPT | Mod: 26,77

## 2024-01-01 RX ORDER — OXYCODONE HYDROCHLORIDE 5 MG/1
5 TABLET ORAL EVERY 4 HOURS
Refills: 0 | Status: DISCONTINUED | OUTPATIENT
Start: 2024-01-01 | End: 2024-01-01

## 2024-01-01 RX ORDER — OXYCODONE HYDROCHLORIDE 5 MG/1
5 TABLET ORAL EVERY 4 HOURS
Refills: 0 | Status: DISCONTINUED | OUTPATIENT
Start: 2024-01-01 | End: 2024-01-03

## 2024-01-01 RX ADMIN — Medication 650 MILLIGRAM(S): at 05:43

## 2024-01-01 RX ADMIN — Medication 81 MILLIGRAM(S): at 12:02

## 2024-01-01 RX ADMIN — ATORVASTATIN CALCIUM 80 MILLIGRAM(S): 80 TABLET, FILM COATED ORAL at 21:54

## 2024-01-01 RX ADMIN — ENOXAPARIN SODIUM 40 MILLIGRAM(S): 100 INJECTION SUBCUTANEOUS at 12:02

## 2024-01-01 RX ADMIN — Medication 650 MILLIGRAM(S): at 00:05

## 2024-01-01 RX ADMIN — Medication 12.5 MILLIGRAM(S): at 17:24

## 2024-01-01 RX ADMIN — MUPIROCIN 1 APPLICATION(S): 20 OINTMENT TOPICAL at 17:41

## 2024-01-01 RX ADMIN — OXYCODONE HYDROCHLORIDE 5 MILLIGRAM(S): 5 TABLET ORAL at 08:59

## 2024-01-01 RX ADMIN — MUPIROCIN 1 APPLICATION(S): 20 OINTMENT TOPICAL at 05:44

## 2024-01-01 RX ADMIN — SENNA PLUS 2 TABLET(S): 8.6 TABLET ORAL at 21:55

## 2024-01-01 RX ADMIN — SODIUM CHLORIDE 3 MILLILITER(S): 9 INJECTION INTRAMUSCULAR; INTRAVENOUS; SUBCUTANEOUS at 21:57

## 2024-01-01 RX ADMIN — OXYCODONE HYDROCHLORIDE 5 MILLIGRAM(S): 5 TABLET ORAL at 18:50

## 2024-01-01 RX ADMIN — Medication 500 MILLIGRAM(S): at 05:43

## 2024-01-01 RX ADMIN — Medication 500 MILLIGRAM(S): at 17:24

## 2024-01-01 RX ADMIN — AMLODIPINE BESYLATE 2.5 MILLIGRAM(S): 2.5 TABLET ORAL at 05:43

## 2024-01-01 RX ADMIN — SODIUM CHLORIDE 3 MILLILITER(S): 9 INJECTION INTRAMUSCULAR; INTRAVENOUS; SUBCUTANEOUS at 05:44

## 2024-01-01 RX ADMIN — OXYCODONE HYDROCHLORIDE 5 MILLIGRAM(S): 5 TABLET ORAL at 10:00

## 2024-01-01 RX ADMIN — CHLORHEXIDINE GLUCONATE 1 APPLICATION(S): 213 SOLUTION TOPICAL at 12:08

## 2024-01-01 RX ADMIN — PANTOPRAZOLE SODIUM 40 MILLIGRAM(S): 20 TABLET, DELAYED RELEASE ORAL at 05:43

## 2024-01-01 RX ADMIN — Medication 650 MILLIGRAM(S): at 05:44

## 2024-01-01 RX ADMIN — OXYCODONE HYDROCHLORIDE 5 MILLIGRAM(S): 5 TABLET ORAL at 19:20

## 2024-01-01 RX ADMIN — POLYETHYLENE GLYCOL 3350 17 GRAM(S): 17 POWDER, FOR SOLUTION ORAL at 12:02

## 2024-01-01 RX ADMIN — Medication 12.5 MILLIGRAM(S): at 05:43

## 2024-01-01 RX ADMIN — SODIUM CHLORIDE 3 MILLILITER(S): 9 INJECTION INTRAMUSCULAR; INTRAVENOUS; SUBCUTANEOUS at 13:16

## 2024-01-01 RX ADMIN — Medication 650 MILLIGRAM(S): at 00:35

## 2024-01-01 NOTE — OCCUPATIONAL THERAPY INITIAL EVALUATION ADULT - PERTINENT HX OF CURRENT PROBLEM, REHAB EVAL
58 Fw/ pmh of htn, hld, cardiac stents-2, second hand smoke exposure presents today for elective cardiac catheterization at [facility] with . The patient c/o midsternal chest pain, aggravate with exertion (swimming and fast walking), 7/10, doesn't radiate, resolve with rest. She denies SOB, palpitations, presyncope, syncope,  headache, visual disturbances, CVA, PE, DVT, MARIA INES, abdominal pain, N/V/D/C, hematochezia, melena, dysuria, hematuria, fever, chills. The patient was evaluated by a cardiologist, was found to have abnormal stress test.  Due to patient's symptoms and abnormal non invasive findings, the patient  was recommended to have cardiac catheterization. Stress test 12/23/23 There is a large-sized, severe defect in the anteropical and inferoapical walls consistent with infarct with severe moncho-infarct ischemia. There is a large sized, moderate to severe defect in the distal anteroseptal wall consistent with infarct with moderate to severe moncho-infarct ischemia; pt now s/p cath at [facility] which revealed + CAD; pt transferred to [facility] for OHS - CABG eval with Dr. Valdez. Now s/p CABGx2 with LARS 58 Fw/ pmh of htn, hld, cardiac stents-2, second hand smoke exposure presents today for elective cardiac catheterization at [facility] with . The patient c/o midsternal chest pain, aggravate with exertion (swimming and fast walking), 7/10, doesn't radiate, resolve with rest. She denies SOB, palpitations, presyncope, syncope,  headache, visual disturbances, CVA, PE, DVT, MARIA INES, abdominal pain, N/V/D/C, hematochezia, melena, dysuria, hematuria, fever, chills. The patient was evaluated by a cardiologist, was found to have abnormal stress test.  Due to patient's symptoms and abnormal non invasive findings, the patient  was recommended to have cardiac catheterization. Stress test 12/23/23 There is a large-sized, severe defect in the anteropical and inferoapical walls consistent with infarct with severe moncho-infarct ischemia. There is a large sized, moderate to severe defect in the distal anteroseptal wall consistent with infarct with moderate to severe moncho-infarct ischemia; pt now s/p cath at [facility] which revealed + CAD; pt transferred to [facility] for OHS - CABG eval with Dr. Valdez. Now s/p CABGx2 with LARS

## 2024-01-01 NOTE — PROGRESS NOTE ADULT - ASSESSMENT
58 Fw/ pmh of htn, hld, cardiac stents-2, second hand smoke exposure presents today for elective cardiac catheterization at [facility] with . The patient c/o midsternal chest pain, aggravate with exertion.  pt transferred to [facility] for OHS - CABG eval with Dr. Valdez. Pt denies cp/ sob at this time.     On 12/29/23, pt . underwent CABG x 2 w/ LIMA/L radial  bleeding post-op requiring multiple products including 10 cryo  paced post-op now SR  l femeral meenakshi d/c'd 12/31 - tr. to floor-  will d/c all CTs tomorrow if output down  12/30 mitchell out voiding  bb/statin/  norvasc for radial artery  dvt prophylaxis  1/1/24 - VSS - med,lct,rct,  in place - w/ minimal output & no air leak.  will d/c CT's later this am   58 Fw/ pmh of htn, hld, cardiac stents-2, second hand smoke exposure presents today for elective cardiac catheterization at [facility] with . The patient c/o midsternal chest pain, aggravate with exertion.  pt transferred to [facility] for OHS - CABG eval with Dr. Valdez. Pt denies cp/ sob at this time.     On 12/29/23, pt . underwent CABG x 2 w/ LIMA/L radial  bleeding post-op requiring multiple products including 10 cryo  paced post-op now SR  l femeral meenakshi d/c'd 12/31 - tr. to floor-  will d/c all CTs tomorrow if output down  12/30 mitchell out voiding  bb/statin/  norvasc for radial artery  dvt prophylaxis  1/1/24 - VSS - med,lct,rct,  in place - w/ minimal output & no air leak.  will d/c CT's later this am

## 2024-01-01 NOTE — PROGRESS NOTE ADULT - SUBJECTIVE AND OBJECTIVE BOX
VITAL SIGNS-Telemetry:  SR 70-80  Vital Signs Last 24 Hrs  T(C): 36.7 (01-01-24 @ 05:12), Max: 36.9 (12-31-23 @ 16:14)  T(F): 98.1 (01-01-24 @ 05:12), Max: 98.4 (12-31-23 @ 16:14)  HR: 80 (01-01-24 @ 05:12) (66 - 81)  BP: 117/79 (01-01-24 @ 05:12) (94/60 - 137/60)  RR: 18 (01-01-24 @ 05:12) (14 - 31)  SpO2: 97% (01-01-24 @ 05:12) (91% - 100%)         12-30 @ 07:01  -  12-31 @ 07:00  --------------------------------------------------------  IN: 2229.8 mL / OUT: 1845 mL / NET: 384.8 mL    12-31 @ 07:01  -  01-01 @ 06:36  --------------------------------------------------------  IN: 580 mL / OUT: 2575 mL / NET: -1995 mL    Daily     Daily     CAPILLARY BLOOD GLUCOSE  117 (31 Dec 2023 12:00)  158 (31 Dec 2023 08:00)  POCT Blood Glucose.: 135 mg/dL (31 Dec 2023 21:22)  POCT Blood Glucose.: 158 mg/dL (31 Dec 2023 16:46)  POCT Blood Glucose.: 117 mg/dL (31 Dec 2023 12:22)  POCT Blood Glucose.: 158 mg/dL (31 Dec 2023 08:03)        Drains:     MS         [ x ] Drainage:   10/80              L Pleural  [ x ]  Drainage: 15/20               R Pleural  [  x]  Drainage:10/75cc  Pacing Wires        [ ]   Settings:                                  Isolated  [ x ]    PHYSICAL EXAM:  Neurology: alert and oriented x 3, nonfocal, no gross deficits  CV : S1S2  Sternal Wound :  CDI , Stable  Lungs: cta  Abdomen: soft, nontender, nondistended, positive bowel sounds, last bowel movement       preop  Extremities:    tr edema  no calf tenderness    acetaminophen     Tablet .. 650 milliGRAM(s) Oral every 6 hours PRN  acetaminophen     Tablet .. 650 milliGRAM(s) Oral every 6 hours  amLODIPine   Tablet 2.5 milliGRAM(s) Oral daily  ascorbic acid 500 milliGRAM(s) Oral two times a day  aspirin enteric coated 81 milliGRAM(s) Oral daily  atorvastatin 80 milliGRAM(s) Oral at bedtime  chlorhexidine 2% Cloths 1 Application(s) Topical daily  chlorhexidine 4% Liquid 1 Application(s) Topical daily  enoxaparin Injectable 40 milliGRAM(s) SubCutaneous every 24 hours  metoprolol tartrate 12.5 milliGRAM(s) Oral two times a day  mupirocin 2% Nasal 1 Application(s) Both Nostrils every 12 hours  ondansetron Injectable 4 milliGRAM(s) IV Push every 6 hours PRN  pantoprazole    Tablet 40 milliGRAM(s) Oral before breakfast  polyethylene glycol 3350 17 Gram(s) Oral daily  senna 2 Tablet(s) Oral at bedtime  sodium chloride 0.9% lock flush 3 milliLiter(s) IV Push every 8 hours    Physical Therapy Rec:   Home  [x  ]   Home w/ PT  [  ]  Rehab  [  ]  Discussed with Cardiothoracic Team at AM rounds.

## 2024-01-02 LAB
ANION GAP SERPL CALC-SCNC: 11 MMOL/L — SIGNIFICANT CHANGE UP (ref 5–17)
ANION GAP SERPL CALC-SCNC: 11 MMOL/L — SIGNIFICANT CHANGE UP (ref 5–17)
BUN SERPL-MCNC: 11 MG/DL — SIGNIFICANT CHANGE UP (ref 7–23)
BUN SERPL-MCNC: 11 MG/DL — SIGNIFICANT CHANGE UP (ref 7–23)
CALCIUM SERPL-MCNC: 9.3 MG/DL — SIGNIFICANT CHANGE UP (ref 8.4–10.5)
CALCIUM SERPL-MCNC: 9.3 MG/DL — SIGNIFICANT CHANGE UP (ref 8.4–10.5)
CHLORIDE SERPL-SCNC: 104 MMOL/L — SIGNIFICANT CHANGE UP (ref 96–108)
CHLORIDE SERPL-SCNC: 104 MMOL/L — SIGNIFICANT CHANGE UP (ref 96–108)
CO2 SERPL-SCNC: 28 MMOL/L — SIGNIFICANT CHANGE UP (ref 22–31)
CO2 SERPL-SCNC: 28 MMOL/L — SIGNIFICANT CHANGE UP (ref 22–31)
CREAT SERPL-MCNC: 0.75 MG/DL — SIGNIFICANT CHANGE UP (ref 0.5–1.3)
CREAT SERPL-MCNC: 0.75 MG/DL — SIGNIFICANT CHANGE UP (ref 0.5–1.3)
EGFR: 93 ML/MIN/1.73M2 — SIGNIFICANT CHANGE UP
EGFR: 93 ML/MIN/1.73M2 — SIGNIFICANT CHANGE UP
GLUCOSE SERPL-MCNC: 109 MG/DL — HIGH (ref 70–99)
GLUCOSE SERPL-MCNC: 109 MG/DL — HIGH (ref 70–99)
HCT VFR BLD CALC: 34.5 % — SIGNIFICANT CHANGE UP (ref 34.5–45)
HCT VFR BLD CALC: 34.5 % — SIGNIFICANT CHANGE UP (ref 34.5–45)
HGB BLD-MCNC: 11.8 G/DL — SIGNIFICANT CHANGE UP (ref 11.5–15.5)
HGB BLD-MCNC: 11.8 G/DL — SIGNIFICANT CHANGE UP (ref 11.5–15.5)
MCHC RBC-ENTMCNC: 30.6 PG — SIGNIFICANT CHANGE UP (ref 27–34)
MCHC RBC-ENTMCNC: 30.6 PG — SIGNIFICANT CHANGE UP (ref 27–34)
MCHC RBC-ENTMCNC: 34.2 GM/DL — SIGNIFICANT CHANGE UP (ref 32–36)
MCHC RBC-ENTMCNC: 34.2 GM/DL — SIGNIFICANT CHANGE UP (ref 32–36)
MCV RBC AUTO: 89.6 FL — SIGNIFICANT CHANGE UP (ref 80–100)
MCV RBC AUTO: 89.6 FL — SIGNIFICANT CHANGE UP (ref 80–100)
NRBC # BLD: 0 /100 WBCS — SIGNIFICANT CHANGE UP (ref 0–0)
NRBC # BLD: 0 /100 WBCS — SIGNIFICANT CHANGE UP (ref 0–0)
PLATELET # BLD AUTO: 186 K/UL — SIGNIFICANT CHANGE UP (ref 150–400)
PLATELET # BLD AUTO: 186 K/UL — SIGNIFICANT CHANGE UP (ref 150–400)
POTASSIUM SERPL-MCNC: 4.1 MMOL/L — SIGNIFICANT CHANGE UP (ref 3.5–5.3)
POTASSIUM SERPL-MCNC: 4.1 MMOL/L — SIGNIFICANT CHANGE UP (ref 3.5–5.3)
POTASSIUM SERPL-SCNC: 4.1 MMOL/L — SIGNIFICANT CHANGE UP (ref 3.5–5.3)
POTASSIUM SERPL-SCNC: 4.1 MMOL/L — SIGNIFICANT CHANGE UP (ref 3.5–5.3)
RBC # BLD: 3.85 M/UL — SIGNIFICANT CHANGE UP (ref 3.8–5.2)
RBC # BLD: 3.85 M/UL — SIGNIFICANT CHANGE UP (ref 3.8–5.2)
RBC # FLD: 13.5 % — SIGNIFICANT CHANGE UP (ref 10.3–14.5)
RBC # FLD: 13.5 % — SIGNIFICANT CHANGE UP (ref 10.3–14.5)
SODIUM SERPL-SCNC: 143 MMOL/L — SIGNIFICANT CHANGE UP (ref 135–145)
SODIUM SERPL-SCNC: 143 MMOL/L — SIGNIFICANT CHANGE UP (ref 135–145)
WBC # BLD: 10.61 K/UL — HIGH (ref 3.8–10.5)
WBC # BLD: 10.61 K/UL — HIGH (ref 3.8–10.5)
WBC # FLD AUTO: 10.61 K/UL — HIGH (ref 3.8–10.5)
WBC # FLD AUTO: 10.61 K/UL — HIGH (ref 3.8–10.5)

## 2024-01-02 PROCEDURE — 99232 SBSQ HOSP IP/OBS MODERATE 35: CPT

## 2024-01-02 RX ORDER — SPIRONOLACTONE 25 MG/1
25 TABLET, FILM COATED ORAL
Refills: 0 | Status: DISCONTINUED | OUTPATIENT
Start: 2024-01-02 | End: 2024-01-03

## 2024-01-02 RX ORDER — METOPROLOL TARTRATE 50 MG
25 TABLET ORAL
Refills: 0 | Status: DISCONTINUED | OUTPATIENT
Start: 2024-01-02 | End: 2024-01-03

## 2024-01-02 RX ORDER — FUROSEMIDE 40 MG
40 TABLET ORAL
Refills: 0 | Status: DISCONTINUED | OUTPATIENT
Start: 2024-01-02 | End: 2024-01-03

## 2024-01-02 RX ORDER — METOPROLOL TARTRATE 50 MG
12.5 TABLET ORAL ONCE
Refills: 0 | Status: COMPLETED | OUTPATIENT
Start: 2024-01-02 | End: 2024-01-02

## 2024-01-02 RX ADMIN — SODIUM CHLORIDE 3 MILLILITER(S): 9 INJECTION INTRAMUSCULAR; INTRAVENOUS; SUBCUTANEOUS at 13:23

## 2024-01-02 RX ADMIN — Medication 500 MILLIGRAM(S): at 05:57

## 2024-01-02 RX ADMIN — SPIRONOLACTONE 25 MILLIGRAM(S): 25 TABLET, FILM COATED ORAL at 16:48

## 2024-01-02 RX ADMIN — SENNA PLUS 2 TABLET(S): 8.6 TABLET ORAL at 22:06

## 2024-01-02 RX ADMIN — Medication 40 MILLIGRAM(S): at 11:16

## 2024-01-02 RX ADMIN — Medication 12.5 MILLIGRAM(S): at 05:56

## 2024-01-02 RX ADMIN — Medication 500 MILLIGRAM(S): at 17:44

## 2024-01-02 RX ADMIN — Medication 12.5 MILLIGRAM(S): at 15:10

## 2024-01-02 RX ADMIN — OXYCODONE HYDROCHLORIDE 5 MILLIGRAM(S): 5 TABLET ORAL at 09:16

## 2024-01-02 RX ADMIN — MUPIROCIN 1 APPLICATION(S): 20 OINTMENT TOPICAL at 05:57

## 2024-01-02 RX ADMIN — ENOXAPARIN SODIUM 40 MILLIGRAM(S): 100 INJECTION SUBCUTANEOUS at 13:24

## 2024-01-02 RX ADMIN — PANTOPRAZOLE SODIUM 40 MILLIGRAM(S): 20 TABLET, DELAYED RELEASE ORAL at 05:58

## 2024-01-02 RX ADMIN — AMLODIPINE BESYLATE 2.5 MILLIGRAM(S): 2.5 TABLET ORAL at 05:57

## 2024-01-02 RX ADMIN — Medication 40 MILLIGRAM(S): at 16:49

## 2024-01-02 RX ADMIN — SPIRONOLACTONE 25 MILLIGRAM(S): 25 TABLET, FILM COATED ORAL at 11:17

## 2024-01-02 RX ADMIN — SODIUM CHLORIDE 3 MILLILITER(S): 9 INJECTION INTRAMUSCULAR; INTRAVENOUS; SUBCUTANEOUS at 20:57

## 2024-01-02 RX ADMIN — SODIUM CHLORIDE 3 MILLILITER(S): 9 INJECTION INTRAMUSCULAR; INTRAVENOUS; SUBCUTANEOUS at 05:59

## 2024-01-02 RX ADMIN — MUPIROCIN 1 APPLICATION(S): 20 OINTMENT TOPICAL at 17:44

## 2024-01-02 RX ADMIN — Medication 81 MILLIGRAM(S): at 11:17

## 2024-01-02 RX ADMIN — OXYCODONE HYDROCHLORIDE 5 MILLIGRAM(S): 5 TABLET ORAL at 09:46

## 2024-01-02 RX ADMIN — ATORVASTATIN CALCIUM 80 MILLIGRAM(S): 80 TABLET, FILM COATED ORAL at 22:06

## 2024-01-02 RX ADMIN — Medication 25 MILLIGRAM(S): at 17:44

## 2024-01-02 RX ADMIN — CHLORHEXIDINE GLUCONATE 1 APPLICATION(S): 213 SOLUTION TOPICAL at 11:20

## 2024-01-02 NOTE — PROGRESS NOTE ADULT - ASSESSMENT
58 Fw/ pmh of htn, hld, cardiac stents-2, second hand smoke exposure presents today for elective cardiac catheterization at [facility] with . The patient c/o midsternal chest pain, aggravate with exertion.  pt transferred to [facility] for OHS - CABG eval with Dr. Valdez. Pt denies cp/ sob at this time.     On 12/29/23, pt . underwent CABG x 2 w/ LIMA/L radial  bleeding post-op requiring multiple products including 10 cryo  paced post-op now SR  l femeral meenakshi d/c'd 12/31 - tr. to floor-  will d/c all CTs tomorrow if output down  12/30 mitchell out voiding  bb/statin/  norvasc for radial artery  dvt prophylaxis  1/1/24 - VSS - med,lct,rct,  in place - w/ minimal output & no air leak.  will d/c CT's later this am    1/2 VSS oob - will d/c pw's d/c plan home ?Wed/thur 58 Fw/ pmh of htn, hld, cardiac stents-2, second hand smoke exposure presents today for elective cardiac catheterization at [facility] with . The patient c/o midsternal chest pain, aggravate with exertion.  pt transferred to [facility] for OHS - CABG eval with Dr. Valdez. Pt denies cp/ sob at this time.     On 12/29/23, pt . underwent CABG x 2 w/ LIMA/L radial  bleeding post-op requiring multiple products including 10 cryo  paced post-op now SR  l femeral meenakshi d/c'd 12/31 - tr. to floor-  will d/c all CTs tomorrow if output down  12/30 mitchell out voiding  bb/statin/  norvasc for radial artery  dvt prophylaxis  1/1/24 - VSS - med,lct,rct,  in place - w/ minimal output & no air leak.  will d/c CT's later this am    1/2 VSS oob - will d/c pw's d/c plan home ?Wed/thur 58 Fw/ pmh of htn, hld, cardiac stents-2, second hand smoke exposure presents today for elective cardiac catheterization at [facility] with . The patient c/o midsternal chest pain, aggravate with exertion.  pt transferred to [facility] for OHS - CABG eval with Dr. Valdez. Pt denies cp/ sob at this time.     On 12/29/23, pt . underwent CABG x 2 w/ LIMA/L radial  bleeding post-op requiring multiple products including 10 cryo  paced post-op now SR  l femeral meenakshi d/c'd 12/31 - tr. to floor-  will d/c all CTs tomorrow if output down  12/30 mitchell out voiding  bb/statin/  norvasc for radial artery  dvt prophylaxis  1/1/24 - VSS - med,lct,rct,  in place - w/ minimal output & no air leak.  will d/c CT's later this am    1/2 VSS oob - will d/c pw's d/c plan home tomorrow - will start lasix 40 bid & aldactone 25 bid- 58 Fw/ pmh of htn, hld, cardiac stents-2, second hand smoke exposure presents today for elective cardiac catheterization at [facility] with . The patient c/o midsternal chest pain, aggravate with exertion.  pt transferred to [facility] for OHS - CABG eval with Dr. Valdez. Pt denies cp/ sob at this time.     On 12/29/23, pt . underwent CABG x 2 w/ LIMA/L radial  bleeding post-op requiring multiple products including 10 cryo  paced post-op now SR  l femeral meenakshi d/c'd 12/31 - tr. to floor-  will d/c all CTs tomorrow if output down  12/30 mitchell out voiding  bb/statin/  norvasc for radial artery  dvt prophylaxis  1/1/24 - VSS - med,lct,rct,  in place - w/ minimal output & no air leak.  will d/c CT's later this am    1/2 VSS oob - will d/c pw's d/c plan home tomorrow - will start lasix 40 bid & aldactone 25 bid-

## 2024-01-02 NOTE — PROGRESS NOTE ADULT - PROVIDER SPECIALTY LIST ADULT
CT Surgery
Cardiology
Cardiology
Critical Care
CT Surgery

## 2024-01-02 NOTE — PROGRESS NOTE ADULT - SUBJECTIVE AND OBJECTIVE BOX
Subjective: Patient seen and examined. No new events except as noted.   POD 4 CABG  all tubes removed  denies sob or cp except some chest wall pain  MEDICATIONS:  MEDICATIONS  (STANDING):  amLODIPine   Tablet 2.5 milliGRAM(s) Oral daily  ascorbic acid 500 milliGRAM(s) Oral two times a day  aspirin enteric coated 81 milliGRAM(s) Oral daily  atorvastatin 80 milliGRAM(s) Oral at bedtime  chlorhexidine 2% Cloths 1 Application(s) Topical daily  chlorhexidine 4% Liquid 1 Application(s) Topical daily  enoxaparin Injectable 40 milliGRAM(s) SubCutaneous every 24 hours  furosemide    Tablet 40 milliGRAM(s) Oral two times a day  metoprolol tartrate 12.5 milliGRAM(s) Oral two times a day  mupirocin 2% Nasal 1 Application(s) Both Nostrils every 12 hours  pantoprazole    Tablet 40 milliGRAM(s) Oral before breakfast  polyethylene glycol 3350 17 Gram(s) Oral daily  senna 2 Tablet(s) Oral at bedtime  sodium chloride 0.9% lock flush 3 milliLiter(s) IV Push every 8 hours  spironolactone 25 milliGRAM(s) Oral two times a day      PHYSICAL EXAM:  T(C): 36.7 (01-02-24 @ 04:37), Max: 37.9 (01-01-24 @ 21:03)  HR: 69 (01-02-24 @ 07:39) (69 - 82)  BP: 114/71 (01-02-24 @ 04:37) (93/60 - 118/70)  RR: 18 (01-02-24 @ 04:37) (18 - 18)  SpO2: 96% (01-02-24 @ 04:37) (85% - 97%)  Wt(kg): --  I&O's Summary    01 Jan 2024 07:01  -  02 Jan 2024 07:00  --------------------------------------------------------  IN: 880 mL / OUT: 2050 mL / NET: -1170 mL          Appearance: Normal	awake alert NAD  HEENT:   Normal oral mucosa, PERRL, EOMI	  Cardiovascular: Normal S1 S2, No JVD, No murmurs ,  Respiratory: Lungscoarse rales at bases  Gastrointestinal:  Soft, Non-tender, + BS	  Skin: No rashes, No ecchymoses, No cyanosis, warm to touch  Musculoskeletal: Normal range of motion, normal strength  Psychiatry:  Mood & affect appropriate  Ext: No edema today        LABS:    CARDIAC MARKERS:                                11.8   10.61 )-----------( 186      ( 02 Jan 2024 05:19 )             34.5     01-02    143  |  104  |  11  ----------------------------<  109<H>  4.1   |  28  |  0.75    Ca    9.3      02 Jan 2024 05:20      proBNP:   Lipid Profile:   HgA1c:   TSH:           TELEMETRY: 	    ECG:  	NSR  RADIOLOGY:   DIAGNOSTIC TESTING:  [ ] Echocardiogram:  [ ]  Catheterization:  [ ] Stress Test:    OTHER:

## 2024-01-02 NOTE — PROGRESS NOTE ADULT - SUBJECTIVE AND OBJECTIVE BOX
VITAL SIGNS-Telemetry:  SR 60-90  Vital Signs Last 24 Hrs  T(C): 36.7 (24 @ 04:37), Max: 37.9 (24 @ 21:03)  T(F): 98.1 (24 @ 04:37), Max: 100.3 (24 @ 21:03)  HR: 69 (24 @ 07:39) (69 - 82)  BP: 114/71 (24 @ 04:37) (93/60 - 118/70)  RR: 18 (24 @ 04:37) (18 - 18)  SpO2: 96% (24 @ 04:37) (85% - 97%)          @ 07:01  -   @ 07:00  --------------------------------------------------------  IN: 880 mL / OUT: 2050 mL / NET: -1170 mL    Daily     Daily Weight in k.7 (2024 08:10)    Pacing Wires        [  ]   Settings:                                  Isolated  [ x ]    PHYSICAL EXAM:  Neurology: alert and oriented x 3, nonfocal, no gross deficits  CV : S1S2  Sternal Wound :  CDI , Stable  Lungs: cta  Abdomen: soft, nontender, nondistended, positive bowel sounds, last bowel movement         Extremities:   tr edema  no calf tenderness      acetaminophen     Tablet .. 650 milliGRAM(s) Oral every 6 hours PRN  amLODIPine   Tablet 2.5 milliGRAM(s) Oral daily  ascorbic acid 500 milliGRAM(s) Oral two times a day  aspirin enteric coated 81 milliGRAM(s) Oral daily  atorvastatin 80 milliGRAM(s) Oral at bedtime  chlorhexidine 2% Cloths 1 Application(s) Topical daily  chlorhexidine 4% Liquid 1 Application(s) Topical daily  enoxaparin Injectable 40 milliGRAM(s) SubCutaneous every 24 hours  metoprolol tartrate 12.5 milliGRAM(s) Oral two times a day  mupirocin 2% Nasal 1 Application(s) Both Nostrils every 12 hours  ondansetron Injectable 4 milliGRAM(s) IV Push every 6 hours PRN  oxyCODONE    IR 5 milliGRAM(s) Oral every 4 hours PRN  pantoprazole    Tablet 40 milliGRAM(s) Oral before breakfast  polyethylene glycol 3350 17 Gram(s) Oral daily  senna 2 Tablet(s) Oral at bedtime  sodium chloride 0.9% lock flush 3 milliLiter(s) IV Push every 8 hours    Physical Therapy Rec:   Home  [ x ]   Home w/ PT  [  ]  Rehab  [  ]  Discussed with Cardiothoracic Team at AM rounds. VITAL SIGNS-Telemetry:  SR 60-90  Vital Signs Last 24 Hrs  T(C): 36.7 (24 @ 04:37), Max: 37.9 (24 @ 21:03)  T(F): 98.1 (24 @ 04:37), Max: 100.3 (24 @ 21:03)  HR: 69 (24 @ 07:39) (69 - 82)  BP: 114/71 (24 @ 04:37) (93/60 - 118/70)  RR: 18 (24 @ 04:37) (18 - 18)  SpO2: 96% (24 @ 04:37) (85% - 97%)          @ 07:01  -   @ 07:00  --------------------------------------------------------  IN: 880 mL / OUT: 2050 mL / NET: -1170 mL    Daily     Daily Weight in k.7 (2024 08:10)    PHYSICAL EXAM:  Neurology: alert and oriented x 3, nonfocal, no gross deficits  CV : S1S2  Sternal Wound :  CDI , Stable  Lungs: cta  Abdomen: soft, nontender, nondistended, positive bowel sounds, last bowel movement       +bm  Extremities:   tr edema  no calf tenderness      acetaminophen     Tablet .. 650 milliGRAM(s) Oral every 6 hours PRN  amLODIPine   Tablet 2.5 milliGRAM(s) Oral daily  ascorbic acid 500 milliGRAM(s) Oral two times a day  aspirin enteric coated 81 milliGRAM(s) Oral daily  atorvastatin 80 milliGRAM(s) Oral at bedtime  chlorhexidine 2% Cloths 1 Application(s) Topical daily  chlorhexidine 4% Liquid 1 Application(s) Topical daily  enoxaparin Injectable 40 milliGRAM(s) SubCutaneous every 24 hours  metoprolol tartrate 12.5 milliGRAM(s) Oral two times a day  mupirocin 2% Nasal 1 Application(s) Both Nostrils every 12 hours  ondansetron Injectable 4 milliGRAM(s) IV Push every 6 hours PRN  oxyCODONE    IR 5 milliGRAM(s) Oral every 4 hours PRN  pantoprazole    Tablet 40 milliGRAM(s) Oral before breakfast  polyethylene glycol 3350 17 Gram(s) Oral daily  senna 2 Tablet(s) Oral at bedtime  sodium chloride 0.9% lock flush 3 milliLiter(s) IV Push every 8 hours    Physical Therapy Rec:   Home  [ x ]   Home w/ PT  [  ]  Rehab  [  ]  Discussed with Cardiothoracic Team at AM rounds.

## 2024-01-02 NOTE — PROGRESS NOTE ADULT - ASSESSMENT
Overall stable s/p CABG day 4 chest tube removed hemodynamically stable  continue as per CT surgery  continue present meds  discharge home 1/3 if stable  followup with me 2 weeks

## 2024-01-03 ENCOUNTER — TRANSCRIPTION ENCOUNTER (OUTPATIENT)
Age: 59
End: 2024-01-03

## 2024-01-03 VITALS
OXYGEN SATURATION: 94 % | DIASTOLIC BLOOD PRESSURE: 63 MMHG | TEMPERATURE: 99 F | SYSTOLIC BLOOD PRESSURE: 104 MMHG | HEART RATE: 80 BPM | RESPIRATION RATE: 18 BRPM

## 2024-01-03 PROBLEM — Z95.5 PRESENCE OF CORONARY ANGIOPLASTY IMPLANT AND GRAFT: Chronic | Status: ACTIVE | Noted: 2023-12-27

## 2024-01-03 LAB
ANION GAP SERPL CALC-SCNC: 13 MMOL/L — SIGNIFICANT CHANGE UP (ref 5–17)
ANION GAP SERPL CALC-SCNC: 13 MMOL/L — SIGNIFICANT CHANGE UP (ref 5–17)
BUN SERPL-MCNC: 16 MG/DL — SIGNIFICANT CHANGE UP (ref 7–23)
BUN SERPL-MCNC: 16 MG/DL — SIGNIFICANT CHANGE UP (ref 7–23)
CALCIUM SERPL-MCNC: 9.4 MG/DL — SIGNIFICANT CHANGE UP (ref 8.4–10.5)
CALCIUM SERPL-MCNC: 9.4 MG/DL — SIGNIFICANT CHANGE UP (ref 8.4–10.5)
CHLORIDE SERPL-SCNC: 100 MMOL/L — SIGNIFICANT CHANGE UP (ref 96–108)
CHLORIDE SERPL-SCNC: 100 MMOL/L — SIGNIFICANT CHANGE UP (ref 96–108)
CO2 SERPL-SCNC: 28 MMOL/L — SIGNIFICANT CHANGE UP (ref 22–31)
CO2 SERPL-SCNC: 28 MMOL/L — SIGNIFICANT CHANGE UP (ref 22–31)
CREAT SERPL-MCNC: 0.87 MG/DL — SIGNIFICANT CHANGE UP (ref 0.5–1.3)
CREAT SERPL-MCNC: 0.87 MG/DL — SIGNIFICANT CHANGE UP (ref 0.5–1.3)
EGFR: 78 ML/MIN/1.73M2 — SIGNIFICANT CHANGE UP
EGFR: 78 ML/MIN/1.73M2 — SIGNIFICANT CHANGE UP
GLUCOSE SERPL-MCNC: 99 MG/DL — SIGNIFICANT CHANGE UP (ref 70–99)
GLUCOSE SERPL-MCNC: 99 MG/DL — SIGNIFICANT CHANGE UP (ref 70–99)
HCT VFR BLD CALC: 35.6 % — SIGNIFICANT CHANGE UP (ref 34.5–45)
HCT VFR BLD CALC: 35.6 % — SIGNIFICANT CHANGE UP (ref 34.5–45)
HGB BLD-MCNC: 12.2 G/DL — SIGNIFICANT CHANGE UP (ref 11.5–15.5)
HGB BLD-MCNC: 12.2 G/DL — SIGNIFICANT CHANGE UP (ref 11.5–15.5)
MCHC RBC-ENTMCNC: 30.7 PG — SIGNIFICANT CHANGE UP (ref 27–34)
MCHC RBC-ENTMCNC: 30.7 PG — SIGNIFICANT CHANGE UP (ref 27–34)
MCHC RBC-ENTMCNC: 34.3 GM/DL — SIGNIFICANT CHANGE UP (ref 32–36)
MCHC RBC-ENTMCNC: 34.3 GM/DL — SIGNIFICANT CHANGE UP (ref 32–36)
MCV RBC AUTO: 89.4 FL — SIGNIFICANT CHANGE UP (ref 80–100)
MCV RBC AUTO: 89.4 FL — SIGNIFICANT CHANGE UP (ref 80–100)
NRBC # BLD: 0 /100 WBCS — SIGNIFICANT CHANGE UP (ref 0–0)
NRBC # BLD: 0 /100 WBCS — SIGNIFICANT CHANGE UP (ref 0–0)
PLATELET # BLD AUTO: 222 K/UL — SIGNIFICANT CHANGE UP (ref 150–400)
PLATELET # BLD AUTO: 222 K/UL — SIGNIFICANT CHANGE UP (ref 150–400)
POTASSIUM SERPL-MCNC: 3.7 MMOL/L — SIGNIFICANT CHANGE UP (ref 3.5–5.3)
POTASSIUM SERPL-MCNC: 3.7 MMOL/L — SIGNIFICANT CHANGE UP (ref 3.5–5.3)
POTASSIUM SERPL-SCNC: 3.7 MMOL/L — SIGNIFICANT CHANGE UP (ref 3.5–5.3)
POTASSIUM SERPL-SCNC: 3.7 MMOL/L — SIGNIFICANT CHANGE UP (ref 3.5–5.3)
RBC # BLD: 3.98 M/UL — SIGNIFICANT CHANGE UP (ref 3.8–5.2)
RBC # BLD: 3.98 M/UL — SIGNIFICANT CHANGE UP (ref 3.8–5.2)
RBC # FLD: 13.3 % — SIGNIFICANT CHANGE UP (ref 10.3–14.5)
RBC # FLD: 13.3 % — SIGNIFICANT CHANGE UP (ref 10.3–14.5)
SODIUM SERPL-SCNC: 141 MMOL/L — SIGNIFICANT CHANGE UP (ref 135–145)
SODIUM SERPL-SCNC: 141 MMOL/L — SIGNIFICANT CHANGE UP (ref 135–145)
WBC # BLD: 9.29 K/UL — SIGNIFICANT CHANGE UP (ref 3.8–10.5)
WBC # BLD: 9.29 K/UL — SIGNIFICANT CHANGE UP (ref 3.8–10.5)
WBC # FLD AUTO: 9.29 K/UL — SIGNIFICANT CHANGE UP (ref 3.8–10.5)
WBC # FLD AUTO: 9.29 K/UL — SIGNIFICANT CHANGE UP (ref 3.8–10.5)

## 2024-01-03 PROCEDURE — 82803 BLOOD GASES ANY COMBINATION: CPT

## 2024-01-03 PROCEDURE — 80053 COMPREHEN METABOLIC PANEL: CPT

## 2024-01-03 PROCEDURE — 85384 FIBRINOGEN ACTIVITY: CPT

## 2024-01-03 PROCEDURE — 97110 THERAPEUTIC EXERCISES: CPT

## 2024-01-03 PROCEDURE — 82550 ASSAY OF CK (CPK): CPT

## 2024-01-03 PROCEDURE — C1769: CPT

## 2024-01-03 PROCEDURE — P9012: CPT

## 2024-01-03 PROCEDURE — 31720 CLEARANCE OF AIRWAYS: CPT

## 2024-01-03 PROCEDURE — 83036 HEMOGLOBIN GLYCOSYLATED A1C: CPT

## 2024-01-03 PROCEDURE — 86923 COMPATIBILITY TEST ELECTRIC: CPT

## 2024-01-03 PROCEDURE — C1894: CPT

## 2024-01-03 PROCEDURE — P9037: CPT

## 2024-01-03 PROCEDURE — 86891 AUTOLOGOUS BLOOD OP SALVAGE: CPT

## 2024-01-03 PROCEDURE — 87641 MR-STAPH DNA AMP PROBE: CPT

## 2024-01-03 PROCEDURE — 86901 BLOOD TYPING SEROLOGIC RH(D): CPT

## 2024-01-03 PROCEDURE — 85025 COMPLETE CBC W/AUTO DIFF WBC: CPT

## 2024-01-03 PROCEDURE — 82947 ASSAY GLUCOSE BLOOD QUANT: CPT

## 2024-01-03 PROCEDURE — P9100: CPT

## 2024-01-03 PROCEDURE — 36430 TRANSFUSION BLD/BLD COMPNT: CPT

## 2024-01-03 PROCEDURE — P9045: CPT

## 2024-01-03 PROCEDURE — 86850 RBC ANTIBODY SCREEN: CPT

## 2024-01-03 PROCEDURE — 87640 STAPH A DNA AMP PROBE: CPT

## 2024-01-03 PROCEDURE — 86900 BLOOD TYPING SEROLOGIC ABO: CPT

## 2024-01-03 PROCEDURE — P9016: CPT

## 2024-01-03 PROCEDURE — 82435 ASSAY OF BLOOD CHLORIDE: CPT

## 2024-01-03 PROCEDURE — 81001 URINALYSIS AUTO W/SCOPE: CPT

## 2024-01-03 PROCEDURE — 84443 ASSAY THYROID STIM HORMONE: CPT

## 2024-01-03 PROCEDURE — 85610 PROTHROMBIN TIME: CPT

## 2024-01-03 PROCEDURE — 85385 FIBRINOGEN ANTIGEN: CPT

## 2024-01-03 PROCEDURE — 85396 CLOTTING ASSAY WHOLE BLOOD: CPT

## 2024-01-03 PROCEDURE — 85018 HEMOGLOBIN: CPT

## 2024-01-03 PROCEDURE — 82553 CREATINE MB FRACTION: CPT

## 2024-01-03 PROCEDURE — 97162 PT EVAL MOD COMPLEX 30 MIN: CPT

## 2024-01-03 PROCEDURE — 85520 HEPARIN ASSAY: CPT

## 2024-01-03 PROCEDURE — 85027 COMPLETE CBC AUTOMATED: CPT

## 2024-01-03 PROCEDURE — 85014 HEMATOCRIT: CPT

## 2024-01-03 PROCEDURE — 85730 THROMBOPLASTIN TIME PARTIAL: CPT

## 2024-01-03 PROCEDURE — 85576 BLOOD PLATELET AGGREGATION: CPT

## 2024-01-03 PROCEDURE — 97116 GAIT TRAINING THERAPY: CPT

## 2024-01-03 PROCEDURE — 84100 ASSAY OF PHOSPHORUS: CPT

## 2024-01-03 PROCEDURE — 80048 BASIC METABOLIC PNL TOTAL CA: CPT

## 2024-01-03 PROCEDURE — 97165 OT EVAL LOW COMPLEX 30 MIN: CPT

## 2024-01-03 PROCEDURE — 71045 X-RAY EXAM CHEST 1 VIEW: CPT | Mod: 26

## 2024-01-03 PROCEDURE — 82330 ASSAY OF CALCIUM: CPT

## 2024-01-03 PROCEDURE — C1889: CPT

## 2024-01-03 PROCEDURE — 84132 ASSAY OF SERUM POTASSIUM: CPT

## 2024-01-03 PROCEDURE — 93005 ELECTROCARDIOGRAM TRACING: CPT

## 2024-01-03 PROCEDURE — 94002 VENT MGMT INPAT INIT DAY: CPT

## 2024-01-03 PROCEDURE — 94010 BREATHING CAPACITY TEST: CPT

## 2024-01-03 PROCEDURE — 83735 ASSAY OF MAGNESIUM: CPT

## 2024-01-03 PROCEDURE — 82565 ASSAY OF CREATININE: CPT

## 2024-01-03 PROCEDURE — 86965 POOLING BLOOD PLATELETS: CPT

## 2024-01-03 PROCEDURE — 36415 COLL VENOUS BLD VENIPUNCTURE: CPT

## 2024-01-03 PROCEDURE — 71045 X-RAY EXAM CHEST 1 VIEW: CPT

## 2024-01-03 PROCEDURE — C1751: CPT

## 2024-01-03 PROCEDURE — 83605 ASSAY OF LACTIC ACID: CPT

## 2024-01-03 PROCEDURE — 93880 EXTRACRANIAL BILAT STUDY: CPT

## 2024-01-03 PROCEDURE — 84295 ASSAY OF SERUM SODIUM: CPT

## 2024-01-03 PROCEDURE — 84484 ASSAY OF TROPONIN QUANT: CPT

## 2024-01-03 PROCEDURE — 80061 LIPID PANEL: CPT

## 2024-01-03 PROCEDURE — 82962 GLUCOSE BLOOD TEST: CPT

## 2024-01-03 RX ORDER — SPIRONOLACTONE 25 MG/1
1 TABLET, FILM COATED ORAL
Qty: 24 | Refills: 0
Start: 2024-01-03 | End: 2024-01-14

## 2024-01-03 RX ORDER — OXYCODONE HYDROCHLORIDE 5 MG/1
1 TABLET ORAL
Qty: 15 | Refills: 0
Start: 2024-01-03 | End: 2024-01-07

## 2024-01-03 RX ORDER — AMLODIPINE BESYLATE 2.5 MG/1
1 TABLET ORAL
Qty: 30 | Refills: 0
Start: 2024-01-03 | End: 2024-02-01

## 2024-01-03 RX ORDER — ATORVASTATIN CALCIUM 80 MG/1
1 TABLET, FILM COATED ORAL
Refills: 0 | DISCHARGE

## 2024-01-03 RX ORDER — PANTOPRAZOLE SODIUM 20 MG/1
1 TABLET, DELAYED RELEASE ORAL
Qty: 30 | Refills: 0
Start: 2024-01-03 | End: 2024-02-01

## 2024-01-03 RX ORDER — AMLODIPINE BESYLATE AND BENAZEPRIL HYDROCHLORIDE 10; 20 MG/1; MG/1
1 CAPSULE ORAL
Qty: 0 | Refills: 0 | DISCHARGE

## 2024-01-03 RX ORDER — POTASSIUM CHLORIDE 20 MEQ
20 PACKET (EA) ORAL
Refills: 0 | Status: DISCONTINUED | OUTPATIENT
Start: 2024-01-03 | End: 2024-01-03

## 2024-01-03 RX ORDER — FUROSEMIDE 40 MG
1 TABLET ORAL
Qty: 24 | Refills: 0
Start: 2024-01-03 | End: 2024-01-14

## 2024-01-03 RX ORDER — METOPROLOL TARTRATE 50 MG
1 TABLET ORAL
Qty: 60 | Refills: 0
Start: 2024-01-03 | End: 2024-02-01

## 2024-01-03 RX ORDER — SENNA PLUS 8.6 MG/1
2 TABLET ORAL
Qty: 10 | Refills: 0
Start: 2024-01-03 | End: 2024-01-07

## 2024-01-03 RX ORDER — ATORVASTATIN CALCIUM 80 MG/1
1 TABLET, FILM COATED ORAL
Qty: 30 | Refills: 0
Start: 2024-01-03 | End: 2024-02-01

## 2024-01-03 RX ADMIN — OXYCODONE HYDROCHLORIDE 5 MILLIGRAM(S): 5 TABLET ORAL at 06:00

## 2024-01-03 RX ADMIN — PANTOPRAZOLE SODIUM 40 MILLIGRAM(S): 20 TABLET, DELAYED RELEASE ORAL at 05:51

## 2024-01-03 RX ADMIN — MUPIROCIN 1 APPLICATION(S): 20 OINTMENT TOPICAL at 05:51

## 2024-01-03 RX ADMIN — Medication 81 MILLIGRAM(S): at 13:03

## 2024-01-03 RX ADMIN — OXYCODONE HYDROCHLORIDE 5 MILLIGRAM(S): 5 TABLET ORAL at 07:00

## 2024-01-03 RX ADMIN — Medication 40 MILLIGRAM(S): at 05:51

## 2024-01-03 RX ADMIN — Medication 20 MILLIEQUIVALENT(S): at 10:29

## 2024-01-03 RX ADMIN — SPIRONOLACTONE 25 MILLIGRAM(S): 25 TABLET, FILM COATED ORAL at 05:51

## 2024-01-03 RX ADMIN — Medication 25 MILLIGRAM(S): at 05:51

## 2024-01-03 RX ADMIN — AMLODIPINE BESYLATE 2.5 MILLIGRAM(S): 2.5 TABLET ORAL at 05:50

## 2024-01-03 RX ADMIN — SODIUM CHLORIDE 3 MILLILITER(S): 9 INJECTION INTRAMUSCULAR; INTRAVENOUS; SUBCUTANEOUS at 05:51

## 2024-01-03 RX ADMIN — Medication 500 MILLIGRAM(S): at 05:50

## 2024-01-03 NOTE — DISCHARGE NOTE PROVIDER - NSDCPNSUBOBJ_GEN_ALL_CORE
"Brenton Leon (74 y.o. Male)             Date of Birth   1947    Social Security Number       Address   [address] [facility] [address]    Home Phone   [phone]    MRN   [id number]       Restorationist   Unknown    Marital Status                               Admission Date   7/8/22    Admission Type   Emergency    Admitting Provider       Attending Provider       Department, Room/Bed   [facility] Emergency Department, H101/H1       Discharge Date   7/9/2022    Discharge Disposition   Home or Self Care    Discharge Destination                               Attending Provider: (none)   Allergies: Penicillins    Isolation: None   Infection: None   Code Status: Prior   Advance Care Planning Activity    Ht: 170.2 cm (67\")   Wt: 63.5 kg (140 lb)    Admission Cmt: None   Principal Problem: None                Active Insurance as of 7/8/2022     Primary Coverage     Payor Plan Insurance Group Employer/Plan Group    ANTHEM MEDICARE REPLACEMENT ANTHEM MEDICARE ADVANTAGE [id number]     Payor Plan Address Payor Plan Phone Number Payor Plan Fax Number Effective Dates    [address] [phone]  1/1/2022 - None Entered    [facility] [address]       Subscriber Name Subscriber Birth Date Member ID       BRENTON LEON 1947 [id number]           Secondary Coverage     Payor Plan Insurance Group Employer/Plan Group    [place]      Payor Plan Address Payor Plan Phone Number Payor Plan Fax Number Effective Dates    [address] [phone]  1/8/2022 - None Entered    [facility] [id number]       Subscriber Name Subscriber Birth Date Member ID       BRENTON LEON 1947 [id number]                 Emergency Contacts      (Rel.) Home Phone Work Phone Mobile Phone    Jelani Leon (Son) [phone] -- [phone]    Betzy Leon (Son) [phone] -- --            Prior to Admission Medications     Prescriptions Last Dose Informant " Patient Reported? Taking?    albuterol sulfate  (90 Base) MCG/ACT inhaler  Child Yes No    Inhale 2 puffs Every 4 (Four) Hours As Needed for Wheezing.    amitriptyline (ELAVIL) 50 MG tablet  Child Yes No    Take 50 mg by mouth Every Night.    amLODIPine (NORVASC) 5 MG tablet  Child No No    Take 1 tablet by mouth Daily.    atorvastatin (Lipitor) 40 MG tablet   No No    Take 1 tablet by mouth Daily.    budesonide-formoterol (SYMBICORT) 80-4.5 MCG/ACT inhaler  Child Yes No    Inhale 2 puffs 2 (Two) Times a Day.    cephalexin (KEFLEX) 500 MG capsule   No No    Take 1 capsule by mouth 2 (Two) Times a Day.    doxycycline (MONODOX) 100 MG capsule   No No    Take 1 capsule by mouth every 12 hours for 7 days as part of COPD Rescue Kit. (Only Start if in YELLOW ZONE.)    escitalopram (LEXAPRO) 20 MG tablet  Child No No    Take 1 tablet by mouth Daily.    fluticasone (FLONASE) 50 MCG/ACT nasal spray  Child Yes No    2 sprays into the nostril(s) as directed by provider Daily As Needed for Rhinitis.    ipratropium-albuterol (DUO-NEB) 0.5-2.5 mg/3 ml nebulizer   No No    Take 3 mL by neb every 30 minutes as needed for shortness of air for up to 6 doses. Part of COPD Rescue Kit. (Only Start if in YELLOW ZONE.)    metoprolol tartrate (LOPRESSOR) 25 MG tablet  Child No No    Take 1 tablet by mouth Every 12 (Twelve) Hours.    pantoprazole (PROTONIX) 40 MG EC tablet   No No    Take 1 tablet by mouth Every Morning.    predniSONE (DELTASONE) 20 MG tablet   No No    Take 2 tablets by mouth daily for 5 days as part of COPD Rescue Kit. (Only Start if in YELLOW ZONE.)    tiZANidine (ZANAFLEX) 4 MG tablet  Child Yes No    Take 2 mg by mouth 4 (Four) Times a Day As Needed for Muscle Spasms.    triamcinolone (KENALOG) 0.1 % cream  Child Yes No    Apply 1 application topically to the appropriate area as directed 3 (Three) Times a Day As Needed for Irritation (Applies to arms).    vitamin D (ERGOCALCIFEROL) 1.25 MG (18476 UT) capsule  capsule  Child Yes No    Take 50,000 Units by mouth 1 (One) Time Per Week. Prior to [facility] Admission, Patient was on:  Takes on Wednesdays          No current facility-administered medications for this encounter.     Current Outpatient Medications   Medication Sig Dispense Refill   • albuterol sulfate  (90 Base) MCG/ACT inhaler Inhale 2 puffs Every 4 (Four) Hours As Needed for Wheezing.     • amitriptyline (ELAVIL) 50 MG tablet Take 50 mg by mouth Every Night.     • amLODIPine (NORVASC) 5 MG tablet Take 1 tablet by mouth Daily. 30 tablet 1   • atorvastatin (Lipitor) 40 MG tablet Take 1 tablet by mouth Daily. 30 tablet 0   • budesonide-formoterol (SYMBICORT) 80-4.5 MCG/ACT inhaler Inhale 2 puffs 2 (Two) Times a Day.     • cephalexin (KEFLEX) 500 MG capsule Take 1 capsule by mouth 2 (Two) Times a Day. 20 capsule 0   • doxycycline (MONODOX) 100 MG capsule Take 1 capsule by mouth every 12 hours for 7 days as part of COPD Rescue Kit. (Only Start if in YELLOW ZONE.) 14 capsule 0   • escitalopram (LEXAPRO) 20 MG tablet Take 1 tablet by mouth Daily. 30 tablet 0   • fluticasone (FLONASE) 50 MCG/ACT nasal spray 2 sprays into the nostril(s) as directed by provider Daily As Needed for Rhinitis.     • ipratropium-albuterol (DUO-NEB) 0.5-2.5 mg/3 ml nebulizer Take 3 mL by neb every 30 minutes as needed for shortness of air for up to 6 doses. Part of COPD Rescue Kit. (Only Start if in YELLOW ZONE.) 18 mL 0   • metoprolol tartrate (LOPRESSOR) 25 MG tablet Take 1 tablet by mouth Every 12 (Twelve) Hours. 60 tablet 1   • pantoprazole (PROTONIX) 40 MG EC tablet Take 1 tablet by mouth Every Morning. 30 tablet 0   • predniSONE (DELTASONE) 20 MG tablet Take 2 tablets by mouth daily for 5 days as part of COPD Rescue Kit. (Only Start if in YELLOW ZONE.) 10 tablet 0   • tiZANidine (ZANAFLEX) 4 MG tablet Take 2 mg by mouth 4 (Four) Times a Day As Needed for Muscle Spasms.     • triamcinolone (KENALOG) 0.1 % cream Apply 1 application  topically to the appropriate area as directed 3 (Three) Times a Day As Needed for Irritation (Applies to arms).     • vitamin D (ERGOCALCIFEROL) 1.25 MG (89841 UT) capsule capsule Take 50,000 Units by mouth 1 (One) Time Per Week. Prior to [facility], Patient was on:  Takes on Wednesdays        SUBJECTIVE: "Hi." Denies acute chest pain, palpitations, or shortness of breath.     TELEMETRY:  SR, 60-80     VITAL SIGNS:  Vital Signs Last 24 Hrs  T(C): 36.8 (24 @ 04:30), Max: 37 (24 @ 19:02)  T(F): 98.2 (24 @ 04:30), Max: 98.6 (24 @ 19:02)  HR: 72 (24 @ 04:30) (72 - 82)  BP: 106/64 (24 @ 04:30) (92/58 - 123/69)  RR: 18 (24 @ 04:30) (17 - 18)  SpO2: 93% (24 @ 04:30) (93% - 94%)           INPUT/OUTPUT:     @ 07:01  -   @ 07:00  --------------------------------------------------------  IN: 980 mL / OUT: 0 mL / NET: 980 mL        LABS:      141  |  100  |  16  ----------------------------<  99  3.7   |  28  |  0.87    Ca    9.4      2024 07:16               12.2   9.29  )-----------( 222      ( 2024 07:18 )             35.6                Daily Weight in k.1 (2024 09:02)        PHYSICAL EXAM:  Neurology: alert and oriented x 3, nonfocal, no gross deficits  CV : S1S2  Sternal Wound :  CDI , Stable  Lungs: CTA B/L; non-labored breathing with no use of accessory muscles; no wheezing/rales, rhonchi appreciated at time of evaluation  Abdomen: soft, nontender, nondistended, positive bowel sounds, +flatus, +BM  Extremities:  peripheral pulses intact bilaterally; +trace LE edema; no calf tenderness        ACTIVE MEDICATIONS:  acetaminophen     Tablet .. 650 milliGRAM(s) Oral every 6 hours PRN  amLODIPine   Tablet 2.5 milliGRAM(s) Oral daily  aspirin enteric coated 81 milliGRAM(s) Oral daily  atorvastatin 80 milliGRAM(s) Oral at bedtime  chlorhexidine 2% Cloths 1 Application(s) Topical daily  chlorhexidine 4% Liquid 1 Application(s) Topical daily  enoxaparin Injectable 40 milliGRAM(s) SubCutaneous every 24 hours  furosemide    Tablet 40 milliGRAM(s) Oral two times a day  metoprolol tartrate 25 milliGRAM(s) Oral two times a day  ondansetron Injectable 4 milliGRAM(s) IV Push every 6 hours PRN  oxyCODONE    IR 5 milliGRAM(s) Oral every 4 hours PRN  pantoprazole    Tablet 40 milliGRAM(s) Oral before breakfast  polyethylene glycol 3350 17 Gram(s) Oral daily  potassium chloride    Tablet ER 20 milliEquivalent(s) Oral every 2 hours  senna 2 Tablet(s) Oral at bedtime  sodium chloride 0.9% lock flush 3 milliLiter(s) IV Push every 8 hours  spironolactone 25 milliGRAM(s) Oral two times a day      Physical Therapy Recommendation:   Home  [ x ]   Home w/ PT  [  ]  Rehab  [  ]      Case discussed in detail with Cardiothoracic Team and Attending, Dr. Greg Valdez      Spent 35 minutes to complete discharge, including medication instruction, routine discharge care, post-op complications, concerns, and follow up care including surgeon appointment.

## 2024-01-03 NOTE — DISCHARGE NOTE PROVIDER - NSDCFUADDAPPT_GEN_ALL_CORE_FT
-Follow up appointment with Dr. Valdez, on 1/16/24 @ 12PM, at the Cardiovascular and Thoracic Surgery office [located in [facility], entrance 3, first floor, on left after entering lobby.] Please call our office telephone [phone] for any questions regarding your appointment.  -Follow-up with your cardiologist Dr. Pavan Pickett in 2 weeks to ensure continuation of care. Please call your cardiologist's office to make the appointment.   -Follow up with your PCP in 1 week to ensure continuation of care. Please call your PCP's office to make the appointment.  -Follow up appointment with Dr. Valdez, on 1/16/24 @ 12PM, at the Cardiovascular and Thoracic Surgery office [located in [facility], entrance 3, first floor, on left after entering lobby.] Please call our office telephone [phone] for any questions regarding your appointment.  -Follow-up with your cardiologist Dr. Pavan Pickett in 2 weeks to ensure continuation of care. Please call your cardiologist's office to make the appointment.   -Follow up with your PCP in 1 week to ensure continuation of care. Please call your PCP's office to make the appointment.

## 2024-01-03 NOTE — DISCHARGE NOTE PROVIDER - NSDCCPCAREPLAN_GEN_ALL_CORE_FT
PRINCIPAL DISCHARGE DIAGNOSIS  Diagnosis: S/P CABG x 2  Assessment and Plan of Treatment:   1. Daily Shower  2. Weight yourself daily, and notify your provider of any weight gain greater than 2-3 pounds in 24 hour period.  3. Diet - low fat, low cholesterol, no added salt.  4. Monitor your wounds for any signs of infection. Cleanse wound/incision sites daily while showering with warm water and mild soap, pat dry, and maintain open to air.   5. Follow Cardiac Surgery Do's and Don'ts discharge instructions.   6. No driving until cleared by MD.   7. No heavy lifting nothing greater than 5 pounds until cleared by MD.   8. Call / Notify MD any fever greater than 101.0  9. Increase Activity as tolerated.  10. Take your medications as directed.  11. Follow up with your outpatient appointments, as directed.  12. Follow up with your PCP in 1-2 weeks post discharge. Please call to make the appointment.   13. Follow up with your cardiologist in 1-2 weeks post discharge.

## 2024-01-03 NOTE — DISCHARGE NOTE PROVIDER - PROVIDER TOKENS
PROVIDER:[TOKEN:[[id number]],SCHEDULEDAPPT:[01/16/2024],SCHEDULEDAPPTTIME:[12:00 PM],ESTABLISHEDPATIENT:[T]],PROVIDER:[TOKEN:[[id number]],FOLLOWUP:[2 weeks]] PROVIDER:[TOKEN:[[id number]],SCHEDULEDAPPT:[01/16/2024],SCHEDULEDAPPTTIME:[12:00 PM],ESTABLISHEDPATIENT:[T]],PROVIDER:[TOKEN:[[id number]],FOLLOWUP:[2 weeks]]

## 2024-01-03 NOTE — DISCHARGE NOTE NURSING/CASE MANAGEMENT/SOCIAL WORK - NSDCPEFALRISK_GEN_ALL_CORE
Still hasn't been able to get med For information on Fall & Injury Prevention, visit: [email or web address]-[email or web address]-mobility OR  [email or web address].[email or web address]/[email or web address]-[email or web address]-injury OR  https://www.cdc.gov/steadi/patient.html For information on Fall & Injury Prevention, visit: [email or web address]://www.[facility]/[email or web address]-[email or web address]-[email or web address]-[email or web address]-mobility OR  https://www.[facility]/news/fall-prevention-tips-to-avoid-injury OR  https://www.cdc.gov/steadi/patient.html

## 2024-01-03 NOTE — DISCHARGE NOTE PROVIDER - NSDCFUADDINST_GEN_ALL_CORE_FT
Please call the Cardiothoracic Surgery office at [phone] if you are experiencing any shortness of breath, chest pain, fevers or chills, drainage from the incisions, persistent nausea, vomiting, pain not relieved with medication, or if you have any questions about your medications.   If the symptoms are severe, call 911 and go to the nearest hospital.     Please weigh yourself daily; Notify the surgeon's office if 2 lb increase in weight within 24 hours.    Please wash incisions with soap and water using washcloth in shower daily. Do NOT apply any lotion, powder, oil, sunscreen to any surgical incisions, until cleared by your surgical team.  Please call the Cardiothoracic Surgery office at [phone] if you are experiencing any shortness of breath, chest pain, fevers or chills, drainage from the incisions, persistent nausea, vomiting, pain not relieved with medication, or if you have any questions about your medications.   If the symptoms are severe, call 911 and go to the nearest hospital.     Please weigh yourself daily; Notify the surgeon's office if 2 lb increase in weight within 24 hours.    Please wash incisions with soap and water using washcloth in shower daily. Do NOT apply any lotion, powder, oil, sunscreen to any surgical incisions, until cleared by your surgical team.

## 2024-01-03 NOTE — DISCHARGE NOTE NURSING/CASE MANAGEMENT/SOCIAL WORK - PATIENT PORTAL LINK FT
You can access the FollowMyHealth Patient Portal offered by [facility] by registering at the following website: [email or web address]. By joining [employer]’s FollowMyHealth portal, you will also be able to view your health information using other applications (apps) compatible with our system. You can access the FollowMyHealth Patient Portal offered by [facility] by registering at the following website: [email or web address]. By joining [facility]’s FollowMyHealth portal, you will also be able to view your health information using other applications (apps) compatible with our system.

## 2024-01-03 NOTE — DISCHARGE NOTE PROVIDER - NSDCMRMEDTOKEN_GEN_ALL_CORE_FT
amLODIPine 2.5 mg oral tablet: 1 tab(s) orally once a day  Aspirin Enteric Coated 81 mg oral delayed release tablet: 1 tab(s) orally once a day  atorvastatin 80 mg oral tablet: 1 tab(s) orally once a day (at bedtime)  CoQ10 100 mg oral capsule: 1 cap(s) orally once a day  furosemide 40 mg oral tablet: 1 tab(s) orally 2 times a day  metoprolol tartrate 25 mg oral tablet: 1 tab(s) orally 2 times a day  oxyCODONE 5 mg oral tablet: 1 tab(s) orally every 8 hours as needed for  severe pain MDD: 3  pantoprazole 40 mg oral delayed release tablet: 1 tab(s) orally once a day (before a meal)  senna leaf extract oral tablet: 2 tab(s) orally once a day (at bedtime)  spironolactone 25 mg oral tablet: 1 tab(s) orally 2 times a day  Vitamin D3 25 mcg (1000 intl units) oral capsule: 1 cap(s) orally once a day

## 2024-01-03 NOTE — DISCHARGE NOTE PROVIDER - CARE PROVIDER_API CALL
Greg Valdez  Thoracic and Cardiac Surgery  [address] [place] [address]  Phone: [phone]  Fax: [phone]  Established Patient  Scheduled Appointment: 01/16/2024 12:00 PM    Pavan Pickett  Cardiovascular Disease  [address] [address]  Phone: [phone]  Fax: [phone]  Follow Up Time: 2 weeks   Greg Valdez  Thoracic and Cardiac Surgery  [address] [place] [address]  Phone: [phone]  Fax: [phone]  Established Patient  Scheduled Appointment: 01/16/2024 12:00 PM    Pavan Pickett  Cardiovascular Disease  [address] [address]  Phone: [phone]  Fax: [phone]  Follow Up Time: 2 weeks

## 2024-01-03 NOTE — DISCHARGE NOTE PROVIDER - NSDCFUSCHEDAPPT_GEN_ALL_CORE_FT
[facility]  [facility] [address]  Scheduled Appointment: 01/16/2024     [facility]  [facility] [address]  Scheduled Appointment: 01/16/2024

## 2024-01-03 NOTE — DISCHARGE NOTE PROVIDER - HOSPITAL COURSE
58 year old female with reported PMHx of HTN, HLD, cardiac stentsx2, second-hand smoke exposure, presented for elective cardiac catheterization at [facility] with . The patient complained of midsternal chest pain, aggravated with exertion, found with multivessel disease. Patient was transferred to [facility] for CABG eval with Dr. Valdez.     Hospital Course:  12/29/23 - s/p CABG x 2 w/ LIMA/L radial.   Post-Op course complicated by bleeding, requiring multiple products including 10 cryo. HR paced post-op now SR  12/30 mitchell out; patient voiding. On bb/statin. Norvasc for radial artery.   12/31 - Left femoral a-line d/c'd. Patient transferred to floor from CTU. Plan to d/c all chest tubes tomorrow if output down  1/1/24 - VSS - med,lct,rct,  in place - w/ minimal output & no air leak => plan to d/c CT's later this am.   1/2 VSS oob - will d/c pw's d/c plan home tomorrow - will start lasix 40 bid & aldactone 25 bid.   1/3 VSS. S/p CXR this AM. All labs and imagings reviewed by CTS Attending. Per Attending Dr. Valdez, patient cleared for discharge home today (1/3/24) and discharge medications reconciled. Of note, patient was on ASA & Plavix pre-op. As per Attending Dr. Valdez, no need to continue plavix on discharge, patient to continue on ASA only. Patient to follow up with Dr. Valdez on 1/16/24 @ 12PM for post-op care. Patient aware of all medications, discharge instructions, and outpatient follow-up appointments. Patient aware to call our CTS Office ([phone]) for any issues/assistance. Patient agreeable with disposition plan. All questions answered to patient's satisfaction. Patient's medications sent to [facility], to be delivered to bedside prior to discharge today. 58 year old female with reported PMHx of HTN, HLD, cardiac stentsx2, second-hand smoke exposure, presented for elective cardiac catheterization at [facility] with . The patient complained of midsternal chest pain, aggravated with exertion, found with multivessel disease. Patient was transferred to [facility] for CABG eval with Dr. Valdez.     Hospital Course:  12/29/23 - s/p CABG x 2 w/ LIMA/L radial.   Post-Op course complicated by bleeding, requiring multiple products including 10 cryo. HR paced post-op now SR  12/30 mitchell out; patient voiding. On bb/statin. Norvasc for radial artery.   12/31 - Left femoral a-line d/c'd. Patient transferred to floor from CTU. Plan to d/c all chest tubes tomorrow if output down  1/1/24 - VSS - med,lct,rct,  in place - w/ minimal output & no air leak => plan to d/c CT's later this am.   1/2 VSS oob - will d/c pw's d/c plan home tomorrow - will start lasix 40 bid & aldactone 25 bid.   1/3 VSS. S/p CXR this AM. All labs and imagings reviewed by CTS Attending. Per Attending Dr. Valdez, patient cleared for discharge home today (1/3/24) and discharge medications reconciled. Of note, patient was on ASA & Plavix pre-op. As per Attending Dr. Valdez, no need to continue plavix on discharge, patient to continue on ASA only. Patient to follow up with Dr. Valdez on 1/16/24 @ 12PM for post-op care. Patient aware of all medications, discharge instructions, and outpatient follow-up appointments. Patient aware to call our CTS Office ([phone]) for any issues/assistance. Patient agreeable with disposition plan. All questions answered to patient's satisfaction. Patient's medications sent to [facility], to be delivered to bedside prior to discharge today.

## 2024-01-04 ENCOUNTER — NON-APPOINTMENT (OUTPATIENT)
Age: 59
End: 2024-01-04

## 2024-01-04 RX ORDER — AMLODIPINE BESYLATE AND BENAZEPRIL HYDROCHLORIDE 5; 20 MG/1; MG/1
5-20 CAPSULE ORAL
Refills: 0 | Status: DISCONTINUED | COMMUNITY
Start: 2023-09-13 | End: 2024-01-04

## 2024-01-04 RX ORDER — CLOPIDOGREL BISULFATE 75 MG/1
75 TABLET, FILM COATED ORAL
Qty: 90 | Refills: 3 | Status: DISCONTINUED | COMMUNITY
Start: 2023-02-03 | End: 2024-01-04

## 2024-01-04 RX ORDER — MULTIVIT-MIN/FOLIC/VIT K/LYCOP 400-300MCG
25 MCG TABLET ORAL DAILY
Refills: 0 | Status: ACTIVE | COMMUNITY
Start: 2024-01-04

## 2024-01-04 RX ORDER — ASPIRIN ENTERIC COATED TABLETS 81 MG 81 MG/1
81 TABLET, DELAYED RELEASE ORAL DAILY
Qty: 30 | Refills: 0 | Status: ACTIVE | COMMUNITY
Start: 2024-01-04

## 2024-01-04 RX ORDER — BLOOD SUGAR DIAGNOSTIC
100 STRIP MISCELLANEOUS DAILY
Refills: 0 | Status: ACTIVE | COMMUNITY
Start: 2024-01-04

## 2024-01-05 ENCOUNTER — APPOINTMENT (OUTPATIENT)
Dept: CARE COORDINATION | Facility: HOME HEALTH | Age: 59
End: 2024-01-05
Payer: COMMERCIAL

## 2024-01-05 VITALS
HEART RATE: 68 BPM | DIASTOLIC BLOOD PRESSURE: 72 MMHG | RESPIRATION RATE: 14 BRPM | OXYGEN SATURATION: 99 % | SYSTOLIC BLOOD PRESSURE: 114 MMHG

## 2024-01-05 PROCEDURE — 99024 POSTOP FOLLOW-UP VISIT: CPT

## 2024-01-05 NOTE — REASON FOR VISIT
PGY 2 addendum    Agree with assessment and plan of Dr. Zelaya      35-year-old  male presented to [facility] ED with complaints of suprapubic rash that started about 4 days ago.  Patient states that he initially noticed a pimple in the suprapubic region 4 days ago and believes that he popped them when he was wearing a belt while at work. He work in construction.  Patient states that there is nothing that makes it better or anything that makes it worse.  Patient has not tried anything to alleviate his symptoms.  Patient also reports shortness of breath that he notices more on exertion.  Patient denies fever, chills, lightheadedness, dizziness, chest pain, PND, abdominal pain nausea, vomiting, dysuria, hematuria, hematochezia      General:  Well developed, well nourished, no acute respiratory distress  Head: Normocephalic, atraumatic  Eyes: PERRL, EOMI, anicteric sclera  Throat: No posterior pharyngeal erythema or exudate, no tonsillar exudate  Neck:  Enlarged neck  CVS:  RRR, S1 and S2 normal, no murmurs, no added heart sounds, rubs, gallops, 2+ peripheral pulses  Resp:  Decreased breath sounds bilaterally at base of the lungs  GI:  Abdomen soft, non-tender, non-distended, normoactive bowel sounds  MSK:  No muscle atrophy, cyanosis, peripheral edema, full range of motion  Skin:  Erythema and suprapubic region mildly tender to touch  Neuro:  Alert and oriented x3, No focal neuro deficits,   Psych:  Appropriate mood and affect       Suprapubic cellulitis  -suprapubic erythema, mildly tender to touch  -CRP 13  -ESR 10  -blood cultures x2 ordered  -started patient on vancomycin    Obesity hypoventilation syndrome  -while patient was in the ED his O2 sats dropped into 50s while he was sleeping  - BMI 77  -ABG pH is 7.26 pCO2 80 PO2 67  -patient was placed on a BiPAP    Community-acquired pneumonia  -bilateral infiltrates present on a chest x-ray with left-sided pleural effusion and wide mediastinum.  Heart border  difficult to see on CXR  -order sputum respiratory cultures and sputum Gram stain  -started patient on Levaquin 750 mg  - CTA PE was ordered    Elevated D-dimer  -D-dimer of 1.25  -patient is on full-dose Lovenox  -CTA PE was ordered      Diabetes type 2  -hemoglobin 6.6  -glucose has been stable  -low-dose sliding scale initiated    Raul Ramirze  Internal medicine resident, PGY 2     [Post Hospitalization] : a post hospitalization visit [FreeTextEntry1] : FOLLOW YOUR HEART - Transitional Care Management Program - [facility]

## 2024-01-05 NOTE — ASSESSMENT
[FreeTextEntry1] : Pt recovering well at home s/p cardiac surgery. Pt lives w/ . Reviewed all medications and dosages with pt understanding. Pt has all medications in home and is taking as prescribed. Pt is aware of F/U appts scheduled and that need to be scheduled as listed below.

## 2024-01-05 NOTE — PHYSICAL EXAM
[Sclera] : the sclera and conjunctiva were normal [Neck Appearance] : the appearance of the neck was normal [] : no respiratory distress [Respiration, Rhythm And Depth] : normal respiratory rhythm and effort [Exaggerated Use Of Accessory Muscles For Inspiration] : no accessory muscle use [Auscultation Breath Sounds / Voice Sounds] : lungs were clear to auscultation bilaterally [Apical Impulse] : the apical impulse was normal [Heart Rate And Rhythm] : heart rate was normal and rhythm regular [Heart Sounds] : normal S1 and S2 [Heart Sounds Gallop] : no gallops [Murmurs] : no murmurs [Heart Sounds Pericardial Friction Rub] : no pericardial rub [FreeTextEntry1] : MSI, CT sites and L radial graft sites without erythema, drainage or warmth, with edges well approximated.  Sternum stable. BL LE -no edema. [Examination Of The Chest] : the chest was normal in appearance [Chest Visual Inspection Thoracic Asymmetry] : no chest asymmetry [Diminished Respiratory Excursion] : normal chest expansion [Breast Appearance] : normal in appearance [Bowel Sounds] : normal bowel sounds [Abdomen Soft] : soft [Abnormal Walk] : normal gait [Skin Color & Pigmentation] : normal skin color and pigmentation [No Focal Deficits] : no focal deficits [Oriented To Time, Place, And Person] : oriented to person, place, and time [Impaired Insight] : insight and judgment were intact [Affect] : the affect was normal [Mood] : the mood was normal

## 2024-01-05 NOTE — PLAN
[TextEntry] : 1) Weigh yourself in the AM prior to eating & drinking. Contact [facility] team if you note a wt gain of 1-2 lbs overnight or 5 lbs within 1 week. Continue current meds. Keep your legs elevated & ambulate as tolerated. Continue incentive spirometry 10x/hr while awake. Shower using mild soap daily. Your diet should be low salt, low fat, high protein. 2) Call FYH team 24/7 w/ any questions, issues, or concerns. My number [phone] was provided to the pt. Explained to pt I personally work from Mon to Fri from 8a to 4p but before or after those hours this number still functions 24/7 and pt will get in touch w/ a team member of CT Surgeon at all times. 3) Report to your FY NP any signs of infection such as redness, swelling, warmth, drainage, or pain at an incision site. Additionally, report to your FY NP if you develop a fever. 4) Do not lift anything more than 5 lbs. 5) Do not drive until you are cleared to do so by your surgeon. 6) Please walk 3x/day.   FOLLOW UP APPOINTMENTS: CTSx: Dr. Valdez on 1-16-24   CARDIOLOGIST: MD Nieves on 1/10/24  PCP: Dr. Ritchie on 1-30-24

## 2024-01-05 NOTE — HISTORY OF PRESENT ILLNESS
[FreeTextEntry1] : 58 year old female with reported PMHx of HTN, HLD, cardiac stentsx2, second-hand smoke exposure, presented for elective cardiac catheterization at [facility] with . The patient complained of midsternal chest pain, aggravated with exertion, found with multivessel disease. Patient was transferred to [facility] for CABG eval with Dr. Valdez.   12/29/23 - s/p CABG x 2 w/ LIMA/L radial. Post-Op course complicated by bleeding, requiring multiple products including 10 cryo. HR paced post-op now SR 12/30 mitchell out; patient voiding. On bb/statin. Norvasc for radial artery. 12/31 - Left femoral a-line d/c'd. Patient transferred to floor from CTU. Plan to d/c all chest tubes tomorrow if output down 1/1/24 - VSS - med,lct,rct,  in place - w/ minimal output & no air leak => plan to d/c CT's later this am. 1/2 VSS oob - will d/c pw's d/c plan home tomorrow - will start lasix 40 bid & aldactone 25 bid. 1/3 VSS. S/p CXR this AM. All labs and imagings reviewed by CTS Attending. Per Attending Dr. Valdez, patient cleared for discharge home today (1/3/24) and discharge medications reconciled. Of note, patient was on ASA & Plavix pre-op. As per Attending Dr. Valdez, no need to continue plavix on discharge, patient to continue on ASA only.  1/5- Seen by [facility] NP for a f/u home visit. Emotional support and education provided. All questions answered. Pt overall is recovering well w/o complaints.

## 2024-01-06 ENCOUNTER — INPATIENT (INPATIENT)
Facility: HOSPITAL | Age: 59
LOS: 0 days | Discharge: HOME HEALTH | DRG: 149 | End: 2024-01-07
Attending: STUDENT IN AN ORGANIZED HEALTH CARE EDUCATION/TRAINING PROGRAM | Admitting: STUDENT IN AN ORGANIZED HEALTH CARE EDUCATION/TRAINING PROGRAM
Payer: COMMERCIAL

## 2024-01-06 ENCOUNTER — TRANSCRIPTION ENCOUNTER (OUTPATIENT)
Age: 59
End: 2024-01-06

## 2024-01-06 VITALS
OXYGEN SATURATION: 96 % | SYSTOLIC BLOOD PRESSURE: 140 MMHG | RESPIRATION RATE: 17 BRPM | WEIGHT: 130.07 LBS | TEMPERATURE: 98 F | HEART RATE: 76 BPM | DIASTOLIC BLOOD PRESSURE: 82 MMHG | HEIGHT: 60 IN

## 2024-01-06 DIAGNOSIS — Z95.5 PRESENCE OF CORONARY ANGIOPLASTY IMPLANT AND GRAFT: Chronic | ICD-10-CM

## 2024-01-06 DIAGNOSIS — Z95.1 PRESENCE OF AORTOCORONARY BYPASS GRAFT: ICD-10-CM

## 2024-01-06 DIAGNOSIS — R42 DIZZINESS AND GIDDINESS: ICD-10-CM

## 2024-01-06 DIAGNOSIS — Z90.49 ACQUIRED ABSENCE OF OTHER SPECIFIED PARTS OF DIGESTIVE TRACT: Chronic | ICD-10-CM

## 2024-01-06 DIAGNOSIS — Z95.1 PRESENCE OF AORTOCORONARY BYPASS GRAFT: Chronic | ICD-10-CM

## 2024-01-06 DIAGNOSIS — Z98.891 HISTORY OF UTERINE SCAR FROM PREVIOUS SURGERY: Chronic | ICD-10-CM

## 2024-01-06 LAB
ALBUMIN SERPL ELPH-MCNC: 3.6 G/DL — SIGNIFICANT CHANGE UP (ref 3.3–5)
ALBUMIN SERPL ELPH-MCNC: 3.6 G/DL — SIGNIFICANT CHANGE UP (ref 3.3–5)
ALBUMIN SERPL ELPH-MCNC: 4 G/DL — SIGNIFICANT CHANGE UP (ref 3.3–5)
ALBUMIN SERPL ELPH-MCNC: 4 G/DL — SIGNIFICANT CHANGE UP (ref 3.3–5)
ALBUMIN SERPL ELPH-MCNC: 4.4 G/DL — SIGNIFICANT CHANGE UP (ref 3.3–5)
ALBUMIN SERPL ELPH-MCNC: 4.4 G/DL — SIGNIFICANT CHANGE UP (ref 3.3–5)
ALP SERPL-CCNC: 73 U/L — SIGNIFICANT CHANGE UP (ref 40–120)
ALP SERPL-CCNC: 73 U/L — SIGNIFICANT CHANGE UP (ref 40–120)
ALP SERPL-CCNC: 78 U/L — SIGNIFICANT CHANGE UP (ref 40–120)
ALP SERPL-CCNC: 78 U/L — SIGNIFICANT CHANGE UP (ref 40–120)
ALP SERPL-CCNC: 79 U/L — SIGNIFICANT CHANGE UP (ref 40–120)
ALP SERPL-CCNC: 79 U/L — SIGNIFICANT CHANGE UP (ref 40–120)
ALT FLD-CCNC: 31 U/L — SIGNIFICANT CHANGE UP (ref 10–45)
ALT FLD-CCNC: 31 U/L — SIGNIFICANT CHANGE UP (ref 10–45)
ALT FLD-CCNC: 43 U/L — SIGNIFICANT CHANGE UP (ref 10–45)
ALT FLD-CCNC: 43 U/L — SIGNIFICANT CHANGE UP (ref 10–45)
ALT FLD-CCNC: 48 U/L — HIGH (ref 10–45)
ALT FLD-CCNC: 48 U/L — HIGH (ref 10–45)
ANION GAP SERPL CALC-SCNC: 10 MMOL/L — SIGNIFICANT CHANGE UP (ref 5–17)
ANION GAP SERPL CALC-SCNC: 10 MMOL/L — SIGNIFICANT CHANGE UP (ref 5–17)
ANION GAP SERPL CALC-SCNC: 12 MMOL/L — SIGNIFICANT CHANGE UP (ref 5–17)
ANION GAP SERPL CALC-SCNC: 12 MMOL/L — SIGNIFICANT CHANGE UP (ref 5–17)
ANION GAP SERPL CALC-SCNC: 13 MMOL/L — SIGNIFICANT CHANGE UP (ref 5–17)
ANION GAP SERPL CALC-SCNC: 13 MMOL/L — SIGNIFICANT CHANGE UP (ref 5–17)
APPEARANCE UR: CLEAR — SIGNIFICANT CHANGE UP
APPEARANCE UR: CLEAR — SIGNIFICANT CHANGE UP
AST SERPL-CCNC: 19 U/L — SIGNIFICANT CHANGE UP (ref 10–40)
AST SERPL-CCNC: 19 U/L — SIGNIFICANT CHANGE UP (ref 10–40)
AST SERPL-CCNC: 54 U/L — HIGH (ref 10–40)
AST SERPL-CCNC: 54 U/L — HIGH (ref 10–40)
AST SERPL-CCNC: 62 U/L — HIGH (ref 10–40)
AST SERPL-CCNC: 62 U/L — HIGH (ref 10–40)
BACTERIA # UR AUTO: NEGATIVE /HPF — SIGNIFICANT CHANGE UP
BACTERIA # UR AUTO: NEGATIVE /HPF — SIGNIFICANT CHANGE UP
BASE EXCESS BLDV CALC-SCNC: 7 MMOL/L — HIGH (ref -2–3)
BASE EXCESS BLDV CALC-SCNC: 7 MMOL/L — HIGH (ref -2–3)
BASE EXCESS BLDV CALC-SCNC: 8.4 MMOL/L — HIGH (ref -2–3)
BASE EXCESS BLDV CALC-SCNC: 8.4 MMOL/L — HIGH (ref -2–3)
BASOPHILS # BLD AUTO: 0.05 K/UL — SIGNIFICANT CHANGE UP (ref 0–0.2)
BASOPHILS # BLD AUTO: 0.05 K/UL — SIGNIFICANT CHANGE UP (ref 0–0.2)
BASOPHILS NFR BLD AUTO: 0.4 % — SIGNIFICANT CHANGE UP (ref 0–2)
BASOPHILS NFR BLD AUTO: 0.4 % — SIGNIFICANT CHANGE UP (ref 0–2)
BILIRUB SERPL-MCNC: 0.8 MG/DL — SIGNIFICANT CHANGE UP (ref 0.2–1.2)
BILIRUB SERPL-MCNC: 0.8 MG/DL — SIGNIFICANT CHANGE UP (ref 0.2–1.2)
BILIRUB SERPL-MCNC: 1.1 MG/DL — SIGNIFICANT CHANGE UP (ref 0.2–1.2)
BILIRUB SERPL-MCNC: 1.1 MG/DL — SIGNIFICANT CHANGE UP (ref 0.2–1.2)
BILIRUB SERPL-MCNC: 1.2 MG/DL — SIGNIFICANT CHANGE UP (ref 0.2–1.2)
BILIRUB SERPL-MCNC: 1.2 MG/DL — SIGNIFICANT CHANGE UP (ref 0.2–1.2)
BILIRUB UR-MCNC: NEGATIVE — SIGNIFICANT CHANGE UP
BILIRUB UR-MCNC: NEGATIVE — SIGNIFICANT CHANGE UP
BUN SERPL-MCNC: 15 MG/DL — SIGNIFICANT CHANGE UP (ref 7–23)
BUN SERPL-MCNC: 15 MG/DL — SIGNIFICANT CHANGE UP (ref 7–23)
BUN SERPL-MCNC: 16 MG/DL — SIGNIFICANT CHANGE UP (ref 7–23)
BUN SERPL-MCNC: 16 MG/DL — SIGNIFICANT CHANGE UP (ref 7–23)
BUN SERPL-MCNC: 17 MG/DL — SIGNIFICANT CHANGE UP (ref 7–23)
BUN SERPL-MCNC: 17 MG/DL — SIGNIFICANT CHANGE UP (ref 7–23)
CA-I SERPL-SCNC: 1.2 MMOL/L — SIGNIFICANT CHANGE UP (ref 1.15–1.33)
CALCIUM SERPL-MCNC: 9.4 MG/DL — SIGNIFICANT CHANGE UP (ref 8.4–10.5)
CALCIUM SERPL-MCNC: 9.4 MG/DL — SIGNIFICANT CHANGE UP (ref 8.4–10.5)
CALCIUM SERPL-MCNC: 9.7 MG/DL — SIGNIFICANT CHANGE UP (ref 8.4–10.5)
CALCIUM SERPL-MCNC: 9.7 MG/DL — SIGNIFICANT CHANGE UP (ref 8.4–10.5)
CALCIUM SERPL-MCNC: 9.9 MG/DL — SIGNIFICANT CHANGE UP (ref 8.4–10.5)
CALCIUM SERPL-MCNC: 9.9 MG/DL — SIGNIFICANT CHANGE UP (ref 8.4–10.5)
CAST: 0 /LPF — SIGNIFICANT CHANGE UP (ref 0–4)
CAST: 0 /LPF — SIGNIFICANT CHANGE UP (ref 0–4)
CHLORIDE BLDV-SCNC: 96 MMOL/L — SIGNIFICANT CHANGE UP (ref 96–108)
CHLORIDE BLDV-SCNC: 96 MMOL/L — SIGNIFICANT CHANGE UP (ref 96–108)
CHLORIDE BLDV-SCNC: 97 MMOL/L — SIGNIFICANT CHANGE UP (ref 96–108)
CHLORIDE BLDV-SCNC: 97 MMOL/L — SIGNIFICANT CHANGE UP (ref 96–108)
CHLORIDE SERPL-SCNC: 103 MMOL/L — SIGNIFICANT CHANGE UP (ref 96–108)
CHLORIDE SERPL-SCNC: 103 MMOL/L — SIGNIFICANT CHANGE UP (ref 96–108)
CHLORIDE SERPL-SCNC: 94 MMOL/L — LOW (ref 96–108)
CHLORIDE SERPL-SCNC: 94 MMOL/L — LOW (ref 96–108)
CHLORIDE SERPL-SCNC: 98 MMOL/L — SIGNIFICANT CHANGE UP (ref 96–108)
CHLORIDE SERPL-SCNC: 98 MMOL/L — SIGNIFICANT CHANGE UP (ref 96–108)
CO2 BLDV-SCNC: 33 MMOL/L — HIGH (ref 22–26)
CO2 BLDV-SCNC: 33 MMOL/L — HIGH (ref 22–26)
CO2 BLDV-SCNC: 36 MMOL/L — HIGH (ref 22–26)
CO2 BLDV-SCNC: 36 MMOL/L — HIGH (ref 22–26)
CO2 SERPL-SCNC: 26 MMOL/L — SIGNIFICANT CHANGE UP (ref 22–31)
CO2 SERPL-SCNC: 26 MMOL/L — SIGNIFICANT CHANGE UP (ref 22–31)
CO2 SERPL-SCNC: 27 MMOL/L — SIGNIFICANT CHANGE UP (ref 22–31)
CO2 SERPL-SCNC: 27 MMOL/L — SIGNIFICANT CHANGE UP (ref 22–31)
CO2 SERPL-SCNC: 28 MMOL/L — SIGNIFICANT CHANGE UP (ref 22–31)
CO2 SERPL-SCNC: 28 MMOL/L — SIGNIFICANT CHANGE UP (ref 22–31)
COLOR SPEC: YELLOW — SIGNIFICANT CHANGE UP
COLOR SPEC: YELLOW — SIGNIFICANT CHANGE UP
CREAT SERPL-MCNC: 0.73 MG/DL — SIGNIFICANT CHANGE UP (ref 0.5–1.3)
CREAT SERPL-MCNC: 0.73 MG/DL — SIGNIFICANT CHANGE UP (ref 0.5–1.3)
CREAT SERPL-MCNC: 0.81 MG/DL — SIGNIFICANT CHANGE UP (ref 0.5–1.3)
CREAT SERPL-MCNC: 0.81 MG/DL — SIGNIFICANT CHANGE UP (ref 0.5–1.3)
CREAT SERPL-MCNC: 1.01 MG/DL — SIGNIFICANT CHANGE UP (ref 0.5–1.3)
CREAT SERPL-MCNC: 1.01 MG/DL — SIGNIFICANT CHANGE UP (ref 0.5–1.3)
DIFF PNL FLD: NEGATIVE — SIGNIFICANT CHANGE UP
DIFF PNL FLD: NEGATIVE — SIGNIFICANT CHANGE UP
EGFR: 65 ML/MIN/1.73M2 — SIGNIFICANT CHANGE UP
EGFR: 65 ML/MIN/1.73M2 — SIGNIFICANT CHANGE UP
EGFR: 85 ML/MIN/1.73M2 — SIGNIFICANT CHANGE UP
EGFR: 85 ML/MIN/1.73M2 — SIGNIFICANT CHANGE UP
EGFR: 96 ML/MIN/1.73M2 — SIGNIFICANT CHANGE UP
EGFR: 96 ML/MIN/1.73M2 — SIGNIFICANT CHANGE UP
EOSINOPHIL # BLD AUTO: 0.16 K/UL — SIGNIFICANT CHANGE UP (ref 0–0.5)
EOSINOPHIL # BLD AUTO: 0.16 K/UL — SIGNIFICANT CHANGE UP (ref 0–0.5)
EOSINOPHIL NFR BLD AUTO: 1.4 % — SIGNIFICANT CHANGE UP (ref 0–6)
EOSINOPHIL NFR BLD AUTO: 1.4 % — SIGNIFICANT CHANGE UP (ref 0–6)
GAS PNL BLDV: 135 MMOL/L — LOW (ref 136–145)
GAS PNL BLDV: 135 MMOL/L — LOW (ref 136–145)
GAS PNL BLDV: 137 MMOL/L — SIGNIFICANT CHANGE UP (ref 136–145)
GAS PNL BLDV: 137 MMOL/L — SIGNIFICANT CHANGE UP (ref 136–145)
GAS PNL BLDV: SIGNIFICANT CHANGE UP
GLUCOSE BLDV-MCNC: 153 MG/DL — HIGH (ref 70–99)
GLUCOSE BLDV-MCNC: 153 MG/DL — HIGH (ref 70–99)
GLUCOSE BLDV-MCNC: 157 MG/DL — HIGH (ref 70–99)
GLUCOSE BLDV-MCNC: 157 MG/DL — HIGH (ref 70–99)
GLUCOSE SERPL-MCNC: 131 MG/DL — HIGH (ref 70–99)
GLUCOSE SERPL-MCNC: 131 MG/DL — HIGH (ref 70–99)
GLUCOSE SERPL-MCNC: 151 MG/DL — HIGH (ref 70–99)
GLUCOSE SERPL-MCNC: 151 MG/DL — HIGH (ref 70–99)
GLUCOSE SERPL-MCNC: 152 MG/DL — HIGH (ref 70–99)
GLUCOSE SERPL-MCNC: 152 MG/DL — HIGH (ref 70–99)
GLUCOSE UR QL: NEGATIVE MG/DL — SIGNIFICANT CHANGE UP
GLUCOSE UR QL: NEGATIVE MG/DL — SIGNIFICANT CHANGE UP
HCO3 BLDV-SCNC: 32 MMOL/L — HIGH (ref 22–29)
HCO3 BLDV-SCNC: 32 MMOL/L — HIGH (ref 22–29)
HCO3 BLDV-SCNC: 34 MMOL/L — HIGH (ref 22–29)
HCO3 BLDV-SCNC: 34 MMOL/L — HIGH (ref 22–29)
HCT VFR BLD CALC: 39.7 % — SIGNIFICANT CHANGE UP (ref 34.5–45)
HCT VFR BLD CALC: 39.7 % — SIGNIFICANT CHANGE UP (ref 34.5–45)
HCT VFR BLDA CALC: 40 % — SIGNIFICANT CHANGE UP (ref 34.5–46.5)
HGB BLD CALC-MCNC: 13.2 G/DL — SIGNIFICANT CHANGE UP (ref 11.7–16.1)
HGB BLD-MCNC: 13.1 G/DL — SIGNIFICANT CHANGE UP (ref 11.5–15.5)
HGB BLD-MCNC: 13.1 G/DL — SIGNIFICANT CHANGE UP (ref 11.5–15.5)
IMM GRANULOCYTES NFR BLD AUTO: 1.1 % — HIGH (ref 0–0.9)
IMM GRANULOCYTES NFR BLD AUTO: 1.1 % — HIGH (ref 0–0.9)
KETONES UR-MCNC: NEGATIVE MG/DL — SIGNIFICANT CHANGE UP
KETONES UR-MCNC: NEGATIVE MG/DL — SIGNIFICANT CHANGE UP
LACTATE BLDV-MCNC: 2.3 MMOL/L — HIGH (ref 0.5–2)
LACTATE BLDV-MCNC: 2.3 MMOL/L — HIGH (ref 0.5–2)
LACTATE BLDV-MCNC: 2.5 MMOL/L — HIGH (ref 0.5–2)
LACTATE BLDV-MCNC: 2.5 MMOL/L — HIGH (ref 0.5–2)
LEUKOCYTE ESTERASE UR-ACNC: ABNORMAL
LEUKOCYTE ESTERASE UR-ACNC: ABNORMAL
LIDOCAIN IGE QN: 42 U/L — SIGNIFICANT CHANGE UP (ref 7–60)
LIDOCAIN IGE QN: 42 U/L — SIGNIFICANT CHANGE UP (ref 7–60)
LYMPHOCYTES # BLD AUTO: 1.41 K/UL — SIGNIFICANT CHANGE UP (ref 1–3.3)
LYMPHOCYTES # BLD AUTO: 1.41 K/UL — SIGNIFICANT CHANGE UP (ref 1–3.3)
LYMPHOCYTES # BLD AUTO: 12.1 % — LOW (ref 13–44)
LYMPHOCYTES # BLD AUTO: 12.1 % — LOW (ref 13–44)
MAGNESIUM SERPL-MCNC: 2.4 MG/DL — SIGNIFICANT CHANGE UP (ref 1.6–2.6)
MAGNESIUM SERPL-MCNC: 2.4 MG/DL — SIGNIFICANT CHANGE UP (ref 1.6–2.6)
MCHC RBC-ENTMCNC: 30.4 PG — SIGNIFICANT CHANGE UP (ref 27–34)
MCHC RBC-ENTMCNC: 30.4 PG — SIGNIFICANT CHANGE UP (ref 27–34)
MCHC RBC-ENTMCNC: 33 GM/DL — SIGNIFICANT CHANGE UP (ref 32–36)
MCHC RBC-ENTMCNC: 33 GM/DL — SIGNIFICANT CHANGE UP (ref 32–36)
MCV RBC AUTO: 92.1 FL — SIGNIFICANT CHANGE UP (ref 80–100)
MCV RBC AUTO: 92.1 FL — SIGNIFICANT CHANGE UP (ref 80–100)
MONOCYTES # BLD AUTO: 0.61 K/UL — SIGNIFICANT CHANGE UP (ref 0–0.9)
MONOCYTES # BLD AUTO: 0.61 K/UL — SIGNIFICANT CHANGE UP (ref 0–0.9)
MONOCYTES NFR BLD AUTO: 5.2 % — SIGNIFICANT CHANGE UP (ref 2–14)
MONOCYTES NFR BLD AUTO: 5.2 % — SIGNIFICANT CHANGE UP (ref 2–14)
NEUTROPHILS # BLD AUTO: 9.34 K/UL — HIGH (ref 1.8–7.4)
NEUTROPHILS # BLD AUTO: 9.34 K/UL — HIGH (ref 1.8–7.4)
NEUTROPHILS NFR BLD AUTO: 79.8 % — HIGH (ref 43–77)
NEUTROPHILS NFR BLD AUTO: 79.8 % — HIGH (ref 43–77)
NITRITE UR-MCNC: NEGATIVE — SIGNIFICANT CHANGE UP
NITRITE UR-MCNC: NEGATIVE — SIGNIFICANT CHANGE UP
NRBC # BLD: 0 /100 WBCS — SIGNIFICANT CHANGE UP (ref 0–0)
NRBC # BLD: 0 /100 WBCS — SIGNIFICANT CHANGE UP (ref 0–0)
NT-PROBNP SERPL-SCNC: 276 PG/ML — SIGNIFICANT CHANGE UP (ref 0–300)
NT-PROBNP SERPL-SCNC: 276 PG/ML — SIGNIFICANT CHANGE UP (ref 0–300)
PCO2 BLDV: 46 MMHG — HIGH (ref 39–42)
PCO2 BLDV: 46 MMHG — HIGH (ref 39–42)
PCO2 BLDV: 50 MMHG — HIGH (ref 39–42)
PCO2 BLDV: 50 MMHG — HIGH (ref 39–42)
PH BLDV: 7.44 — HIGH (ref 7.32–7.43)
PH BLDV: 7.44 — HIGH (ref 7.32–7.43)
PH BLDV: 7.45 — HIGH (ref 7.32–7.43)
PH BLDV: 7.45 — HIGH (ref 7.32–7.43)
PH UR: 6.5 — SIGNIFICANT CHANGE UP (ref 5–8)
PH UR: 6.5 — SIGNIFICANT CHANGE UP (ref 5–8)
PLATELET # BLD AUTO: 347 K/UL — SIGNIFICANT CHANGE UP (ref 150–400)
PLATELET # BLD AUTO: 347 K/UL — SIGNIFICANT CHANGE UP (ref 150–400)
PO2 BLDV: 35 MMHG — SIGNIFICANT CHANGE UP (ref 25–45)
PO2 BLDV: 35 MMHG — SIGNIFICANT CHANGE UP (ref 25–45)
PO2 BLDV: 56 MMHG — HIGH (ref 25–45)
PO2 BLDV: 56 MMHG — HIGH (ref 25–45)
POTASSIUM BLDV-SCNC: 4 MMOL/L — SIGNIFICANT CHANGE UP (ref 3.5–5.1)
POTASSIUM BLDV-SCNC: 4 MMOL/L — SIGNIFICANT CHANGE UP (ref 3.5–5.1)
POTASSIUM BLDV-SCNC: 4.3 MMOL/L — SIGNIFICANT CHANGE UP (ref 3.5–5.1)
POTASSIUM BLDV-SCNC: 4.3 MMOL/L — SIGNIFICANT CHANGE UP (ref 3.5–5.1)
POTASSIUM SERPL-MCNC: 4 MMOL/L — SIGNIFICANT CHANGE UP (ref 3.5–5.3)
POTASSIUM SERPL-MCNC: 4 MMOL/L — SIGNIFICANT CHANGE UP (ref 3.5–5.3)
POTASSIUM SERPL-MCNC: 5.9 MMOL/L — HIGH (ref 3.5–5.3)
POTASSIUM SERPL-MCNC: 5.9 MMOL/L — HIGH (ref 3.5–5.3)
POTASSIUM SERPL-MCNC: 6.1 MMOL/L — HIGH (ref 3.5–5.3)
POTASSIUM SERPL-MCNC: 6.1 MMOL/L — HIGH (ref 3.5–5.3)
POTASSIUM SERPL-SCNC: 4 MMOL/L — SIGNIFICANT CHANGE UP (ref 3.5–5.3)
POTASSIUM SERPL-SCNC: 4 MMOL/L — SIGNIFICANT CHANGE UP (ref 3.5–5.3)
POTASSIUM SERPL-SCNC: 5.9 MMOL/L — HIGH (ref 3.5–5.3)
POTASSIUM SERPL-SCNC: 5.9 MMOL/L — HIGH (ref 3.5–5.3)
POTASSIUM SERPL-SCNC: 6.1 MMOL/L — HIGH (ref 3.5–5.3)
POTASSIUM SERPL-SCNC: 6.1 MMOL/L — HIGH (ref 3.5–5.3)
PROT SERPL-MCNC: 6.5 G/DL — SIGNIFICANT CHANGE UP (ref 6–8.3)
PROT SERPL-MCNC: 6.5 G/DL — SIGNIFICANT CHANGE UP (ref 6–8.3)
PROT SERPL-MCNC: 7.7 G/DL — SIGNIFICANT CHANGE UP (ref 6–8.3)
PROT SERPL-MCNC: 7.7 G/DL — SIGNIFICANT CHANGE UP (ref 6–8.3)
PROT SERPL-MCNC: 8 G/DL — SIGNIFICANT CHANGE UP (ref 6–8.3)
PROT SERPL-MCNC: 8 G/DL — SIGNIFICANT CHANGE UP (ref 6–8.3)
PROT UR-MCNC: NEGATIVE MG/DL — SIGNIFICANT CHANGE UP
PROT UR-MCNC: NEGATIVE MG/DL — SIGNIFICANT CHANGE UP
RBC # BLD: 4.31 M/UL — SIGNIFICANT CHANGE UP (ref 3.8–5.2)
RBC # BLD: 4.31 M/UL — SIGNIFICANT CHANGE UP (ref 3.8–5.2)
RBC # FLD: 13.2 % — SIGNIFICANT CHANGE UP (ref 10.3–14.5)
RBC # FLD: 13.2 % — SIGNIFICANT CHANGE UP (ref 10.3–14.5)
RBC CASTS # UR COMP ASSIST: 1 /HPF — SIGNIFICANT CHANGE UP (ref 0–4)
RBC CASTS # UR COMP ASSIST: 1 /HPF — SIGNIFICANT CHANGE UP (ref 0–4)
REVIEW: SIGNIFICANT CHANGE UP
REVIEW: SIGNIFICANT CHANGE UP
SAO2 % BLDV: 52.6 % — LOW (ref 67–88)
SAO2 % BLDV: 52.6 % — LOW (ref 67–88)
SAO2 % BLDV: 87.8 % — SIGNIFICANT CHANGE UP (ref 67–88)
SAO2 % BLDV: 87.8 % — SIGNIFICANT CHANGE UP (ref 67–88)
SODIUM SERPL-SCNC: 134 MMOL/L — LOW (ref 135–145)
SODIUM SERPL-SCNC: 134 MMOL/L — LOW (ref 135–145)
SODIUM SERPL-SCNC: 136 MMOL/L — SIGNIFICANT CHANGE UP (ref 135–145)
SODIUM SERPL-SCNC: 136 MMOL/L — SIGNIFICANT CHANGE UP (ref 135–145)
SODIUM SERPL-SCNC: 141 MMOL/L — SIGNIFICANT CHANGE UP (ref 135–145)
SODIUM SERPL-SCNC: 141 MMOL/L — SIGNIFICANT CHANGE UP (ref 135–145)
SP GR SPEC: >1.03 — HIGH (ref 1–1.03)
SP GR SPEC: >1.03 — HIGH (ref 1–1.03)
SQUAMOUS # UR AUTO: 1 /HPF — SIGNIFICANT CHANGE UP (ref 0–5)
SQUAMOUS # UR AUTO: 1 /HPF — SIGNIFICANT CHANGE UP (ref 0–5)
TROPONIN T, HIGH SENSITIVITY RESULT: 18 NG/L — SIGNIFICANT CHANGE UP (ref 0–51)
TROPONIN T, HIGH SENSITIVITY RESULT: 18 NG/L — SIGNIFICANT CHANGE UP (ref 0–51)
TROPONIN T, HIGH SENSITIVITY RESULT: 19 NG/L — SIGNIFICANT CHANGE UP (ref 0–51)
TROPONIN T, HIGH SENSITIVITY RESULT: 19 NG/L — SIGNIFICANT CHANGE UP (ref 0–51)
UROBILINOGEN FLD QL: 0.2 MG/DL — SIGNIFICANT CHANGE UP (ref 0.2–1)
UROBILINOGEN FLD QL: 0.2 MG/DL — SIGNIFICANT CHANGE UP (ref 0.2–1)
WBC # BLD: 11.7 K/UL — HIGH (ref 3.8–10.5)
WBC # BLD: 11.7 K/UL — HIGH (ref 3.8–10.5)
WBC # FLD AUTO: 11.7 K/UL — HIGH (ref 3.8–10.5)
WBC # FLD AUTO: 11.7 K/UL — HIGH (ref 3.8–10.5)
WBC UR QL: 4 /HPF — SIGNIFICANT CHANGE UP (ref 0–5)
WBC UR QL: 4 /HPF — SIGNIFICANT CHANGE UP (ref 0–5)

## 2024-01-06 PROCEDURE — 70450 CT HEAD/BRAIN W/O DYE: CPT | Mod: 26,MA

## 2024-01-06 PROCEDURE — 99222 1ST HOSP IP/OBS MODERATE 55: CPT | Mod: 24

## 2024-01-06 PROCEDURE — 71046 X-RAY EXAM CHEST 2 VIEWS: CPT | Mod: 26

## 2024-01-06 PROCEDURE — 93306 TTE W/DOPPLER COMPLETE: CPT | Mod: 26

## 2024-01-06 PROCEDURE — 99285 EMERGENCY DEPT VISIT HI MDM: CPT

## 2024-01-06 RX ORDER — METOPROLOL TARTRATE 50 MG
25 TABLET ORAL
Refills: 0 | Status: DISCONTINUED | OUTPATIENT
Start: 2024-01-06 | End: 2024-01-07

## 2024-01-06 RX ORDER — SODIUM ZIRCONIUM CYCLOSILICATE 10 G/10G
10 POWDER, FOR SUSPENSION ORAL ONCE
Refills: 0 | Status: COMPLETED | OUTPATIENT
Start: 2024-01-06 | End: 2024-01-06

## 2024-01-06 RX ORDER — ATORVASTATIN CALCIUM 80 MG/1
80 TABLET, FILM COATED ORAL AT BEDTIME
Refills: 0 | Status: DISCONTINUED | OUTPATIENT
Start: 2024-01-06 | End: 2024-01-07

## 2024-01-06 RX ORDER — ACETAMINOPHEN 500 MG
1000 TABLET ORAL ONCE
Refills: 0 | Status: COMPLETED | OUTPATIENT
Start: 2024-01-06 | End: 2024-01-06

## 2024-01-06 RX ORDER — ASPIRIN/CALCIUM CARB/MAGNESIUM 324 MG
81 TABLET ORAL DAILY
Refills: 0 | Status: DISCONTINUED | OUTPATIENT
Start: 2024-01-06 | End: 2024-01-07

## 2024-01-06 RX ORDER — METOCLOPRAMIDE HCL 10 MG
10 TABLET ORAL ONCE
Refills: 0 | Status: DISCONTINUED | OUTPATIENT
Start: 2024-01-06 | End: 2024-01-06

## 2024-01-06 RX ORDER — INFLUENZA VIRUS VACCINE 15; 15; 15; 15 UG/.5ML; UG/.5ML; UG/.5ML; UG/.5ML
0.5 SUSPENSION INTRAMUSCULAR ONCE
Refills: 0 | Status: DISCONTINUED | OUTPATIENT
Start: 2024-01-06 | End: 2024-01-07

## 2024-01-06 RX ORDER — AMLODIPINE BESYLATE 2.5 MG/1
2.5 TABLET ORAL DAILY
Refills: 0 | Status: DISCONTINUED | OUTPATIENT
Start: 2024-01-06 | End: 2024-01-07

## 2024-01-06 RX ORDER — SODIUM CHLORIDE 9 MG/ML
500 INJECTION INTRAMUSCULAR; INTRAVENOUS; SUBCUTANEOUS
Refills: 0 | Status: COMPLETED | OUTPATIENT
Start: 2024-01-06 | End: 2024-01-06

## 2024-01-06 RX ORDER — PANTOPRAZOLE SODIUM 20 MG/1
40 TABLET, DELAYED RELEASE ORAL
Refills: 0 | Status: DISCONTINUED | OUTPATIENT
Start: 2024-01-06 | End: 2024-01-07

## 2024-01-06 RX ORDER — SODIUM CHLORIDE 9 MG/ML
500 INJECTION INTRAMUSCULAR; INTRAVENOUS; SUBCUTANEOUS ONCE
Refills: 0 | Status: COMPLETED | OUTPATIENT
Start: 2024-01-06 | End: 2024-01-06

## 2024-01-06 RX ADMIN — Medication 400 MILLIGRAM(S): at 12:40

## 2024-01-06 RX ADMIN — ATORVASTATIN CALCIUM 80 MILLIGRAM(S): 80 TABLET, FILM COATED ORAL at 20:14

## 2024-01-06 RX ADMIN — SODIUM ZIRCONIUM CYCLOSILICATE 10 GRAM(S): 10 POWDER, FOR SUSPENSION ORAL at 16:36

## 2024-01-06 RX ADMIN — SODIUM CHLORIDE 500 MILLILITER(S): 9 INJECTION INTRAMUSCULAR; INTRAVENOUS; SUBCUTANEOUS at 14:00

## 2024-01-06 RX ADMIN — Medication 25 MILLIGRAM(S): at 17:10

## 2024-01-06 RX ADMIN — SODIUM CHLORIDE 250 MILLILITER(S): 9 INJECTION INTRAMUSCULAR; INTRAVENOUS; SUBCUTANEOUS at 17:06

## 2024-01-06 NOTE — ED CLERICAL - NS ED CLERK NOTE PRE-ARRIVAL INFORMATION; ADDITIONAL PRE-ARRIVAL INFORMATION
This patient is enrolled in the Follow Your Heart program and has undergone a cardiac surgery procedure within the last 30 days and has active care navigation. This patient can be followed up by the care navigation team within 24 hours. To arrange close follow-up or to obtain additional clinical information about this patient, please call the contact number above. Please call the cardiac surgery team once patient is registered at [phone] for consultation PRIOR to disposition decision.  The patient recently underwent a cardiac surgery procedure and the team can assist in acute medical management. This patient is enrolled in the Follow Your Heart program and has undergone a cardiac surgery procedure within the last 30 days and has active care navigation. This patient can be followed up by the care navigation team within 24 hours. To arrange close follow-up or to obtain additional clinical information about this patient, please call the contact number above. Please call the cardiac surgery team once patient is registered at [phone] for consultation PRIOR to disposition decision.  The patient recently underwent a cardiac surgery procedure and the team can assist in acute medical management.

## 2024-01-06 NOTE — H&P ADULT - NSHPREVIEWOFSYSTEMS_GEN_ALL_CORE
General: Reports headache, No Weight change/ No Fatigue/ states she felt lightheaded this am, not currently	    Skin/Breast: No Rashes/ Lesions/ Masses  	  Ophthalmologic: No Blurry vision/ Glaucoma/ Blindness  	  ENMT: No Hearing loss/ Drainage/ Lesions	    Respiratory and Thorax: No Cough/ Wheezing/ SOB/ Hemoptysis/ Sputum production  	  Cardiovascular: No Chest pain/ Palpitations/ Diaphoresis	    Gastrointestinal: No Nausea/ Vomiting/ Constipation/ Appetite Change	    Genitourinary: No Hematuria/ Dysuria/ Frequency change/ Impotence	    Musculoskeletal: No Pain/ Weakness/ Claudication	    Neurological: No Seizures/ TIA/CVA/ Parastesias	    Psychiatric: No Dementia/ Depression/ SI/HI	    Hematology/Lymphatics: No hx of bleeding/ Edema	    Endocrine: No Hyperglycemia/ Hypoglycemia    Allergic/Immunologic: No Anaphylaxis/ Intolerance/ Recent illnesses

## 2024-01-06 NOTE — ED PROVIDER NOTE - OBJECTIVE STATEMENT
58 year old female with reported PMHx of HTN, HLD, cardiac Recent CABG with Dr. Valdez.  presenting to the ED with dizziness like episodes, positional 58 year old female with reported PMHx of HTN, HLD, cardiac Recent CABG with Dr. Valdez.  presenting to the ED with dizziness like episodes, positional. Patient has a history of vertigo, but  symptoms have been persistent.  Patient denying any new or changing chest pain, shortness of breath.

## 2024-01-06 NOTE — ED PROVIDER NOTE - ATTENDING CONTRIBUTION TO CARE
attending Vic: 58yF h/o HTN, HLD, vertigo, CAD with recent CABG last week presents after episode of dizziness, positional, since resolved. Feels like prior vertigo but was more persistent when present. Denies new chest pain, SOB, syncope, headache. Exam as above. Will obtain ekg, place on tele, labs, CTH and CTA head/neck, review prior hospitalization records, discuss with CT surgery as patient recently postop, reassess

## 2024-01-06 NOTE — ED PROVIDER NOTE - PROGRESS NOTE DETAILS
Rah Romeo, PGY3   CT surgery was consulted and they will come and see patient she recently had a CABG. Rah Romeo, PGY3 ET surgery would like to take patient under their service.  Will admit under Dr. Neville

## 2024-01-06 NOTE — PATIENT PROFILE ADULT - FALL HARM RISK - HARM RISK INTERVENTIONS
Communicate Risk of Fall with Harm to all staff/Reinforce activity limits and safety measures with patient and family/Tailored Fall Risk Interventions/Visual Cue: Yellow wristband and red socks/Bed in lowest position, wheels locked, appropriate side rails in place/Call bell, personal items and telephone in reach/Instruct patient to call for assistance before getting out of bed or chair/Non-slip footwear when patient is out of bed/[place] to call system/Physically safe environment - no spills, clutter or unnecessary equipment/Purposeful Proactive Rounding/Room/bathroom lighting operational, light cord in reach Communicate Risk of Fall with Harm to all staff/Reinforce activity limits and safety measures with patient and family/Tailored Fall Risk Interventions/Visual Cue: Yellow wristband and red socks/Bed in lowest position, wheels locked, appropriate side rails in place/Call bell, personal items and telephone in reach/Instruct patient to call for assistance before getting out of bed or chair/Non-slip footwear when patient is out of bed/[place] to call system/Physically safe environment - no spills, clutter or unnecessary equipment/Purposeful Proactive Rounding/Room/bathroom lighting operational, light cord in reach

## 2024-01-06 NOTE — H&P ADULT - NSICDXPASTSURGICALHX_GEN_ALL_CORE_FT
PAST SURGICAL HISTORY:  S/P appendectomy     S/P      S/P CABG x 2     S/P coronary artery stent placement

## 2024-01-06 NOTE — ED PROVIDER NOTE - CLINICAL SUMMARY MEDICAL DECISION MAKING FREE TEXT BOX
58 year old female with reported PMHx of HTN, HLD, cardiac stentsx2, second-hand smoke exposure, presented for elective cardiac catheterization at [facility] with . The patient complained of midsternal chest pain, aggravated with exertion, found with multivessel disease. Patient was transferred to [facility] for CABG eval with Dr. Valdez.     Will get CT to evaluate for dizziness.  Will get labs and also evaluate for ACS versus pericarditis.  Will consult CT surgery. 58 year old female with reported PMHx of HTN, HLD, cardiac stentsx2, second-hand smoke exposure, presented for elective cardiac catheterization at [facility] with . The patient complained of midsternal chest pain, aggravated with exertion, found with multivessel disease. Patient was transferred to [facility] for CABG eval with Dr. Valdez.     Will get CT to evaluate for dizziness.  Will get labs and also evaluate for ACS versus pericarditis.  Will consult CT surgery.

## 2024-01-06 NOTE — H&P ADULT - NSHPPHYSICALEXAM_GEN_ALL_CORE
General: Age-appearing, in no acute distress  Head: Normochephalic, atraumatic  ENMT: EOMI, neck supple  Cardiovascular: +S1, S2; Regular rate and rhythm, no murmurs, rubs, gallops  Respiratory: CTA BL, no wheezes, rales, rhonchi  Gastrointestinal: Abdomen soft, non-tender, +BS in all 4 quadrants  Extremities: No clubbing, cyanosis, or edema  Vascular: 2+ pulses, cap refill < 2 seconds  Neuro: Non-focal, AAOx4, sensation intact BL  Musculoskeletal: Normal tone, no deformities  Skin: Well healed sternal incision  Psych: Appropriate, cooperative

## 2024-01-06 NOTE — H&P ADULT - HISTORY OF PRESENT ILLNESS
58 Fw/ pmh of htn, hld, cardiac stents-2 and on 12/29/23, pt . underwent CABG x 2 w/ LIMA/L radial  bleeding post-op requiring multiple products including 10 cryo Uneventful post op course DC home 1/3 presents to ER this am reporting feeling lightheaded this am after walking to bathroom, states she took oxycodone during nite for back pain and again this am and when she got OOB an hour later she felt lightheaded and noticed her med sites had a little drainage. Called care navigator and advised she come to ER for evaluation  Examined lying on stretcher,  at bedside She appears comfortable, stable hemodynamics, but now reports headache, Skin warm/dry to touch, neg nausea and her mediastinal and pleural sites have no visible drainage

## 2024-01-06 NOTE — ED ADULT NURSE NOTE - NSFALLHARMRISKINTERV_ED_ALL_ED
Assistance OOB with selected safe patient handling equipment if applicable/Assistance with ambulation/Communicate risk of Fall with Harm to all staff, patient, and family/Encourage patient to sit up slowly, dangle for a short time, stand at bedside before walking/Monitor gait and stability/Orthostatic vital signs/Provide visual cue: red socks, yellow wristband, yellow gown, etc/Reinforce activity limits and safety measures with patient and family/Bed in lowest position, wheels locked, appropriate side rails in place/Call bell, personal items and telephone in reach/Instruct patient to call for assistance before getting out of bed/chair/stretcher/Non-slip footwear applied when patient is off stretcher/[place] to call system/Physically safe environment - no spills, clutter or unnecessary equipment/Purposeful Proactive Rounding/Room/bathroom lighting operational, light cord in reach Assistance OOB with selected safe patient handling equipment if applicable/Assistance with ambulation/Communicate risk of Fall with Harm to all staff, patient, and family/Encourage patient to sit up slowly, dangle for a short time, stand at bedside before walking/Monitor gait and stability/Orthostatic vital signs/Provide visual cue: red socks, yellow wristband, yellow gown, etc/Reinforce activity limits and safety measures with patient and family/Bed in lowest position, wheels locked, appropriate side rails in place/Call bell, personal items and telephone in reach/Instruct patient to call for assistance before getting out of bed/chair/stretcher/Non-slip footwear applied when patient is off stretcher/[place] to call system/Physically safe environment - no spills, clutter or unnecessary equipment/Purposeful Proactive Rounding/Room/bathroom lighting operational, light cord in reach

## 2024-01-06 NOTE — H&P ADULT - ASSESSMENT
58 Fw/ pmh of htn, hld, cardiac stents-2 and on 12/29/23, pt . underwent CABG x 2 w/ LIMA/L radial  bleeding post-op requiring multiple products including 10 cryo Uneventful post op course DC home 1/3 presents to ER this am reporting feeling lightheaded this am after walking to bathroom, states she took oxycodone during nite for back pain and again this am and when she got OOB an hour later she felt lightheaded and noticed her med sites had a little drainage. Called care navigator and advised she come to ER for evaluation

## 2024-01-06 NOTE — ED PROVIDER NOTE - PHYSICAL EXAMINATION
Const: Well-nourished, Well-developed, appearing stated age.  Eyes: no conjunctival injection, and symmetrical lids.  HEENT: Head NCAT, no lesions. Atraumatic external nose and ears. Moist MM.  Neck: Symmetric, trachea midline.   RESP: Unlabored respiratory effort.   GI: Nontender/Nondistended  MSK: Normocephalic/Atraumatic, Lower Extremities w/o calf tenderness or edema b/l.   Skin: Warm, dry and intact.  incisional sites well-healing, no purulent drainage.  Neuro: CNs II-XII intact. Motor & Sensation grossly intact.  Psych: Awake, Alert, & Oriented (AAO) x3. Appropriate mood and affect.

## 2024-01-06 NOTE — ED ADULT TRIAGE NOTE - CHIEF COMPLAINT QUOTE
dizzy and clammy when getting up this morning, recent bypass surgery  denies chest pain, shortness of breath, states dizziness resolves when not moving  hx of vertigo

## 2024-01-06 NOTE — ED ADULT NURSE NOTE - OBJECTIVE STATEMENT
Pt is 58y F with PMH HTN, HLD, CAD, and PSH bypass (12/28/2023) complaining of dizziness, nausea, vomiting, and diaphoresis this morning. Upon waking up, pt reports onset of dizziness and nausea, unable to ambulate to restroom independently. Upon assessment, A&Ox4, endorses mild dizziness at rest with mild nausea, endorses back pain, 5 cm lateral sternal open incision from recent cardiac surgery, no active bleeding or surrounding swelling/redness. Vitals WNL.

## 2024-01-06 NOTE — H&P ADULT - PROBLEM SELECTOR PLAN 2
ASA statin betablocker  DC diuretics, no edema/lightheaded/hyperkalemia  ECHO  EKG  CXR  UA  DC narcotics

## 2024-01-07 ENCOUNTER — TRANSCRIPTION ENCOUNTER (OUTPATIENT)
Age: 59
End: 2024-01-07

## 2024-01-07 VITALS
SYSTOLIC BLOOD PRESSURE: 106 MMHG | HEART RATE: 70 BPM | DIASTOLIC BLOOD PRESSURE: 70 MMHG | OXYGEN SATURATION: 95 % | TEMPERATURE: 99 F | RESPIRATION RATE: 18 BRPM

## 2024-01-07 LAB
ALBUMIN SERPL ELPH-MCNC: 3.8 G/DL — SIGNIFICANT CHANGE UP (ref 3.3–5)
ALBUMIN SERPL ELPH-MCNC: 3.8 G/DL — SIGNIFICANT CHANGE UP (ref 3.3–5)
ALP SERPL-CCNC: 77 U/L — SIGNIFICANT CHANGE UP (ref 40–120)
ALP SERPL-CCNC: 77 U/L — SIGNIFICANT CHANGE UP (ref 40–120)
ALT FLD-CCNC: 30 U/L — SIGNIFICANT CHANGE UP (ref 10–45)
ALT FLD-CCNC: 30 U/L — SIGNIFICANT CHANGE UP (ref 10–45)
ANION GAP SERPL CALC-SCNC: 15 MMOL/L — SIGNIFICANT CHANGE UP (ref 5–17)
ANION GAP SERPL CALC-SCNC: 15 MMOL/L — SIGNIFICANT CHANGE UP (ref 5–17)
AST SERPL-CCNC: 17 U/L — SIGNIFICANT CHANGE UP (ref 10–40)
AST SERPL-CCNC: 17 U/L — SIGNIFICANT CHANGE UP (ref 10–40)
BILIRUB SERPL-MCNC: 0.9 MG/DL — SIGNIFICANT CHANGE UP (ref 0.2–1.2)
BILIRUB SERPL-MCNC: 0.9 MG/DL — SIGNIFICANT CHANGE UP (ref 0.2–1.2)
BUN SERPL-MCNC: 12 MG/DL — SIGNIFICANT CHANGE UP (ref 7–23)
BUN SERPL-MCNC: 12 MG/DL — SIGNIFICANT CHANGE UP (ref 7–23)
CALCIUM SERPL-MCNC: 9.6 MG/DL — SIGNIFICANT CHANGE UP (ref 8.4–10.5)
CALCIUM SERPL-MCNC: 9.6 MG/DL — SIGNIFICANT CHANGE UP (ref 8.4–10.5)
CHLORIDE SERPL-SCNC: 102 MMOL/L — SIGNIFICANT CHANGE UP (ref 96–108)
CHLORIDE SERPL-SCNC: 102 MMOL/L — SIGNIFICANT CHANGE UP (ref 96–108)
CO2 SERPL-SCNC: 25 MMOL/L — SIGNIFICANT CHANGE UP (ref 22–31)
CO2 SERPL-SCNC: 25 MMOL/L — SIGNIFICANT CHANGE UP (ref 22–31)
CREAT SERPL-MCNC: 0.81 MG/DL — SIGNIFICANT CHANGE UP (ref 0.5–1.3)
CREAT SERPL-MCNC: 0.81 MG/DL — SIGNIFICANT CHANGE UP (ref 0.5–1.3)
EGFR: 85 ML/MIN/1.73M2 — SIGNIFICANT CHANGE UP
EGFR: 85 ML/MIN/1.73M2 — SIGNIFICANT CHANGE UP
GLUCOSE SERPL-MCNC: 102 MG/DL — HIGH (ref 70–99)
GLUCOSE SERPL-MCNC: 102 MG/DL — HIGH (ref 70–99)
HCT VFR BLD CALC: 36 % — SIGNIFICANT CHANGE UP (ref 34.5–45)
HCT VFR BLD CALC: 36 % — SIGNIFICANT CHANGE UP (ref 34.5–45)
HGB BLD-MCNC: 11.8 G/DL — SIGNIFICANT CHANGE UP (ref 11.5–15.5)
HGB BLD-MCNC: 11.8 G/DL — SIGNIFICANT CHANGE UP (ref 11.5–15.5)
MCHC RBC-ENTMCNC: 30.2 PG — SIGNIFICANT CHANGE UP (ref 27–34)
MCHC RBC-ENTMCNC: 30.2 PG — SIGNIFICANT CHANGE UP (ref 27–34)
MCHC RBC-ENTMCNC: 32.8 GM/DL — SIGNIFICANT CHANGE UP (ref 32–36)
MCHC RBC-ENTMCNC: 32.8 GM/DL — SIGNIFICANT CHANGE UP (ref 32–36)
MCV RBC AUTO: 92.1 FL — SIGNIFICANT CHANGE UP (ref 80–100)
MCV RBC AUTO: 92.1 FL — SIGNIFICANT CHANGE UP (ref 80–100)
NRBC # BLD: 0 /100 WBCS — SIGNIFICANT CHANGE UP (ref 0–0)
NRBC # BLD: 0 /100 WBCS — SIGNIFICANT CHANGE UP (ref 0–0)
PLATELET # BLD AUTO: 375 K/UL — SIGNIFICANT CHANGE UP (ref 150–400)
PLATELET # BLD AUTO: 375 K/UL — SIGNIFICANT CHANGE UP (ref 150–400)
POTASSIUM SERPL-MCNC: 3.9 MMOL/L — SIGNIFICANT CHANGE UP (ref 3.5–5.3)
POTASSIUM SERPL-MCNC: 3.9 MMOL/L — SIGNIFICANT CHANGE UP (ref 3.5–5.3)
POTASSIUM SERPL-SCNC: 3.9 MMOL/L — SIGNIFICANT CHANGE UP (ref 3.5–5.3)
POTASSIUM SERPL-SCNC: 3.9 MMOL/L — SIGNIFICANT CHANGE UP (ref 3.5–5.3)
PROT SERPL-MCNC: 6.7 G/DL — SIGNIFICANT CHANGE UP (ref 6–8.3)
PROT SERPL-MCNC: 6.7 G/DL — SIGNIFICANT CHANGE UP (ref 6–8.3)
RBC # BLD: 3.91 M/UL — SIGNIFICANT CHANGE UP (ref 3.8–5.2)
RBC # BLD: 3.91 M/UL — SIGNIFICANT CHANGE UP (ref 3.8–5.2)
RBC # FLD: 12.9 % — SIGNIFICANT CHANGE UP (ref 10.3–14.5)
RBC # FLD: 12.9 % — SIGNIFICANT CHANGE UP (ref 10.3–14.5)
SODIUM SERPL-SCNC: 142 MMOL/L — SIGNIFICANT CHANGE UP (ref 135–145)
SODIUM SERPL-SCNC: 142 MMOL/L — SIGNIFICANT CHANGE UP (ref 135–145)
WBC # BLD: 9.55 K/UL — SIGNIFICANT CHANGE UP (ref 3.8–10.5)
WBC # BLD: 9.55 K/UL — SIGNIFICANT CHANGE UP (ref 3.8–10.5)
WBC # FLD AUTO: 9.55 K/UL — SIGNIFICANT CHANGE UP (ref 3.8–10.5)
WBC # FLD AUTO: 9.55 K/UL — SIGNIFICANT CHANGE UP (ref 3.8–10.5)

## 2024-01-07 PROCEDURE — 85014 HEMATOCRIT: CPT

## 2024-01-07 PROCEDURE — 83880 ASSAY OF NATRIURETIC PEPTIDE: CPT

## 2024-01-07 PROCEDURE — 82435 ASSAY OF BLOOD CHLORIDE: CPT

## 2024-01-07 PROCEDURE — 84295 ASSAY OF SERUM SODIUM: CPT

## 2024-01-07 PROCEDURE — 83735 ASSAY OF MAGNESIUM: CPT

## 2024-01-07 PROCEDURE — 81001 URINALYSIS AUTO W/SCOPE: CPT

## 2024-01-07 PROCEDURE — 83690 ASSAY OF LIPASE: CPT

## 2024-01-07 PROCEDURE — 85025 COMPLETE CBC W/AUTO DIFF WBC: CPT

## 2024-01-07 PROCEDURE — 85027 COMPLETE CBC AUTOMATED: CPT

## 2024-01-07 PROCEDURE — 80053 COMPREHEN METABOLIC PANEL: CPT

## 2024-01-07 PROCEDURE — 83605 ASSAY OF LACTIC ACID: CPT

## 2024-01-07 PROCEDURE — 82330 ASSAY OF CALCIUM: CPT

## 2024-01-07 PROCEDURE — 82803 BLOOD GASES ANY COMBINATION: CPT

## 2024-01-07 PROCEDURE — C8929: CPT

## 2024-01-07 PROCEDURE — 70450 CT HEAD/BRAIN W/O DYE: CPT | Mod: MA

## 2024-01-07 PROCEDURE — 84484 ASSAY OF TROPONIN QUANT: CPT

## 2024-01-07 PROCEDURE — 84132 ASSAY OF SERUM POTASSIUM: CPT

## 2024-01-07 PROCEDURE — 71046 X-RAY EXAM CHEST 2 VIEWS: CPT

## 2024-01-07 PROCEDURE — 85018 HEMOGLOBIN: CPT

## 2024-01-07 PROCEDURE — 82947 ASSAY GLUCOSE BLOOD QUANT: CPT

## 2024-01-07 PROCEDURE — 96374 THER/PROPH/DIAG INJ IV PUSH: CPT

## 2024-01-07 PROCEDURE — 36415 COLL VENOUS BLD VENIPUNCTURE: CPT

## 2024-01-07 PROCEDURE — 99285 EMERGENCY DEPT VISIT HI MDM: CPT | Mod: 25

## 2024-01-07 RX ORDER — SENNA PLUS 8.6 MG/1
2 TABLET ORAL
Qty: 0 | Refills: 0 | DISCHARGE
Start: 2024-01-07

## 2024-01-07 RX ORDER — ACETAMINOPHEN 500 MG
2 TABLET ORAL
Qty: 0 | Refills: 0 | DISCHARGE

## 2024-01-07 RX ADMIN — Medication 25 MILLIGRAM(S): at 05:31

## 2024-01-07 RX ADMIN — AMLODIPINE BESYLATE 2.5 MILLIGRAM(S): 2.5 TABLET ORAL at 05:31

## 2024-01-07 RX ADMIN — PANTOPRAZOLE SODIUM 40 MILLIGRAM(S): 20 TABLET, DELAYED RELEASE ORAL at 05:31

## 2024-01-07 NOTE — DISCHARGE NOTE PROVIDER - CARE PROVIDER_API CALL
Greg Valdez  Thoracic and Cardiac Surgery  [address] [place] [id number]  Phone: [phone]  Fax: [phone]  Scheduled Appointment: 01/16/2024 12:00 PM   Greg Valdez  Thoracic and Cardiac Surgery  [address] [place] [id number]  Phone: [phone]  Fax: [phone]  Scheduled Appointment: 01/16/2024 12:00 PM

## 2024-01-07 NOTE — DISCHARGE NOTE PROVIDER - NSDCFUADDAPPT_GEN_ALL_CORE_FT
-Follow up appointment with Dr. Valdez, on 1/16/24 @ 12PM, at the Cardiovascular and Thoracic Surgery office [located in [facility], entrance 3, first floor, on left after entering lobby.] Please call our office telephone [phone] for any questions regarding your appointment.  -Follow-up with your cardiologist Dr. Pavan Pickett in 2 weeks to ensure continuation of care. Please call your cardiologist's office to make the appointment.   -Follow up with your PCP in 2 week to ensure continuation of care. Please call your PCP's office to make the appointment.   -Follow up appointment with Dr. Valdez, on 1/16/24 @ 12PM, at the Cardiovascular and Thoracic Surgery office [located in [facility], entrance 3, first floor, on left after entering lobby.] Please call our office telephone [phone] for any questions regarding your appointment.  -Follow-up with your cardiologist Dr. Pavan Pickett in 2 weeks to ensure continuation of care. Please call your cardiologist's office to make the appointment.   -Follow up with your PCP in 2 week to ensure continuation of care. Please call your PCP's office to make the appointment.

## 2024-01-07 NOTE — DISCHARGE NOTE PROVIDER - NSDCPNSUBOBJ_GEN_ALL_CORE
Vital Signs Last 24 Hrs  T(C): 36.8 (07 Jan 2024 06:10), Max: 36.9 (06 Jan 2024 16:57)  T(F): 98.2 (07 Jan 2024 06:10), Max: 98.4 (06 Jan 2024 16:57)  HR: 71 (07 Jan 2024 06:10) (62 - 76)  BP: 104/61 (07 Jan 2024 04:47) (100/62 - 142/84)  BP(mean): 75 (07 Jan 2024 04:47) (75 - 75)  RR: 18 (07 Jan 2024 06:10) (16 - 18)  SpO2: 96% (07 Jan 2024 06:10) (95% - 98%)  room air    PHYSICAL EXAM  Neurology: A&Ox3, NAD  CV : RRR+S1S2  Sternal Wound: MSI CDI CESILIA, Stable  Lungs: Respirations non-labored, B/L BS CTA  Abdomen: Soft, NT/ND, +BSx4Q, last BM 1/7 (-)N/V/D  : Voiding without difficulty  Extremities: B/L LE no edema, negative calf tenderness, +PP Left radial incision CDI CESILIA        45 minutes face to face spent on total encounter, patient counseling and discharge instructions.

## 2024-01-07 NOTE — DISCHARGE NOTE PROVIDER - NSDCMRMEDTOKEN_GEN_ALL_CORE_FT
acetaminophen 325 mg oral tablet: 2 tab(s) orally every 6 hours as needed for  mild pain  amLODIPine 2.5 mg oral tablet: 1 tab(s) orally once a day  Aspirin Enteric Coated 81 mg oral delayed release tablet: 1 tab(s) orally once a day  atorvastatin 80 mg oral tablet: 1 tab(s) orally once a day (at bedtime)  CoQ10 100 mg oral capsule: 1 cap(s) orally once a day  metoprolol tartrate 25 mg oral tablet: 1 tab(s) orally 2 times a day  oxyCODONE 5 mg oral tablet: 1 tab(s) orally every 8 hours as needed for  severe pain MDD: 3  pantoprazole 40 mg oral delayed release tablet: 1 tab(s) orally once a day (before a meal)  senna (sennosides) 8.6 mg oral tablet: 2 tab(s) orally once a day (at bedtime) as needed for  constipation  Vitamin D3 25 mcg (1000 intl units) oral capsule: 1 cap(s) orally once a day

## 2024-01-07 NOTE — DISCHARGE NOTE PROVIDER - PROVIDER TOKENS
PROVIDER:[TOKEN:[[id number]],SCHEDULEDAPPT:[01/16/2024],SCHEDULEDAPPTTIME:[12:00 PM]] PROVIDER:[TOKEN:[[id number]],SCHEDULEDAPPT:[01/16/2024],SCHEDULEDAPPTTIME:[12:00 PM]]

## 2024-01-07 NOTE — DISCHARGE NOTE NURSING/CASE MANAGEMENT/SOCIAL WORK - PATIENT PORTAL LINK FT
You can access the FollowMyHealth Patient Portal offered by [facility] by registering at the following website: [email or web address]. By joining [employer]’s FollowMyHealth portal, you will also be able to view your health information using other applications (apps) compatible with our system. You can access the FollowMyHealth Patient Portal offered by [facility] by registering at the following website: [email or web address]. By joining [employer]’s FollowMyHealth portal, you will also be able to view your health information using other applications (apps) compatible with our system.

## 2024-01-07 NOTE — DISCHARGE NOTE PROVIDER - HOSPITAL COURSE
58 Female w/ pmh of htn, hld, cardiac stents-2 and on 12/29/23, pt . underwent CABG x 2 w/ LIMA/L radial with Dr. Valdez.   bleeding post-op requiring multiple products including 10 cryo Uneventful post op course DC home 1/3 presents to ER this am reporting feeling lightheaded this am after walking to bathroom, states she took oxycodone during nite for back pain and again this am and when she got OOB an hour later she felt lightheaded and noticed her med sites had a little drainage. Called care navigator and advised she come to ER for evaluation  Examined lying on stretcher,  at bedside She appears comfortable, stable hemodynamics, but now reports headache, Skin warm/dry to touch, neg nausea and her mediastinal and pleural sites have no visible drainage.  1/6 TTE: Left ventricular systolic function is normal. No pericardial effusion. CXR: small partially loculated left pleural effusion.  1/7 VSS, negative orthostatics. Pt ambulating in hallway states she is feeling better, cleared for discharge home today per Dr. Neville.

## 2024-01-07 NOTE — DISCHARGE NOTE PROVIDER - NSDCFUSCHEDAPPT_GEN_ALL_CORE_FT
Pavan Pickett  [facility] Physician [facility]  CARDIOLOGY [address] [facility]   Scheduled Appointment: 01/10/2024    Greg Valdez  [facility]  CTSURG [address]  Scheduled Appointment: 01/16/2024     Pavan Pickett  [facility] Physician [facility]  CARDIOLOGY [address] [facility]   Scheduled Appointment: 01/10/2024    Greg Valdez  [facility]  CTSURG [address]  Scheduled Appointment: 01/16/2024

## 2024-01-07 NOTE — DISCHARGE NOTE PROVIDER - NSDCCPCAREPLAN_GEN_ALL_CORE_FT
PRINCIPAL DISCHARGE DIAGNOSIS  Diagnosis: S/P CABG x 2  Assessment and Plan of Treatment: 1. Daily Shower  2. Weight yourself daily.  3. Regular diet - low fat, low cholesterol, no added salt.  4. Cleanse Midsternal incision and arm incision daily while showering with warm water and mild soap, pat dry and maintain open to air.   5. Follow Cardiac Surgery Do's and Don'ts discharge instructions.   6. Increase Activity as tolerated.

## 2024-01-07 NOTE — DISCHARGE NOTE NURSING/CASE MANAGEMENT/SOCIAL WORK - NSDCPEFALRISK_GEN_ALL_CORE
For information on Fall & Injury Prevention, visit: [email or web address] OR  [email or web address] OR  https://www.cdc.gov/steadi/patient.html For information on Fall & Injury Prevention, visit: [email or web address] OR  [email or web address] OR  https://www.cdc.gov/steadi/patient.html

## 2024-01-08 ENCOUNTER — NON-APPOINTMENT (OUTPATIENT)
Age: 59
End: 2024-01-08

## 2024-01-10 ENCOUNTER — NON-APPOINTMENT (OUTPATIENT)
Age: 59
End: 2024-01-10

## 2024-01-10 ENCOUNTER — APPOINTMENT (OUTPATIENT)
Dept: CARDIOLOGY | Facility: CLINIC | Age: 59
End: 2024-01-10
Payer: COMMERCIAL

## 2024-01-10 VITALS
OXYGEN SATURATION: 100 % | HEART RATE: 77 BPM | SYSTOLIC BLOOD PRESSURE: 120 MMHG | BODY MASS INDEX: 23.75 KG/M2 | WEIGHT: 121 LBS | DIASTOLIC BLOOD PRESSURE: 78 MMHG | HEIGHT: 60 IN

## 2024-01-10 DIAGNOSIS — E78.2 MIXED HYPERLIPIDEMIA: ICD-10-CM

## 2024-01-10 DIAGNOSIS — Z95.5 PRESENCE OF CORONARY ANGIOPLASTY IMPLANT AND GRAFT: ICD-10-CM

## 2024-01-10 PROCEDURE — 99214 OFFICE O/P EST MOD 30 MIN: CPT | Mod: 25

## 2024-01-10 PROCEDURE — 93000 ELECTROCARDIOGRAM COMPLETE: CPT

## 2024-01-10 NOTE — REASON FOR VISIT
[FreeTextEntry1] : Ansley Lan  58 years old here for follow-up of her cardiac status.  She is now approximately 1 week post discharge from coronary bypass surgery

## 2024-01-10 NOTE — PHYSICAL EXAM
[Well Nourished] : well nourished [Normal Conjunctiva] : normal conjunctiva [No Carotid Bruit] : no carotid bruit [Normal S1, S2] : normal S1, S2 [No Murmur] : no murmur [Clear Lung Fields] : clear lung fields [Soft] : abdomen soft [Non Tender] : non-tender [No Edema] : no edema [de-identified] :  no acute distress [de-identified] :  no rub [de-identified] :  no rales rhonchi or wheeze chest wall well-healed with a little bump in the clavicle area slightly tender no redness

## 2024-01-10 NOTE — HISTORY OF PRESENT ILLNESS
[FreeTextEntry1] :  Ansley and has a history of hyperlipidemia and hypertension and has a recent history of coronary artery disease with progressive angina.  She is status post of the  stent to the LAD x 2 but had recurrence of pain in December with an abnormal stress test.  She underwent repeat catheterization which revealed disease that progressed into the left main and proximal LAD.  She was transferred to [facility] where she underwent a two-vessel coronary bypass surgery with a LIMA to the LAD and a radial to the ramus.  She was discharged 1 week ago with an echo revealing normal left ventricular function with a loculated effusion no pericardial effusion   presently she complains of pain around the incision site.  She does not have any angina.  She is not dizzy or lightheaded.    EKG dated January10, 2024 normal sinus rhythm T inversions laterally in the precordial leads nonspecific   present medications include amlodipine 2.5 atorvastatin 80 CoQ 1000 metoprolol tartrate 25 mg twice a day oxycodone as needed pantoprazole 40 vitamin D3

## 2024-01-10 NOTE — DISCUSSION/SUMMARY
[FreeTextEntry1] :  overall the patient is doing extremely well post bypass.  She is to continue her present regimen of medication and slowly increase her walking and activity.    Routine follow-up with her internist and follow-up with me again in 2 months [EKG obtained to assist in diagnosis and management of assessed problem(s)] : EKG obtained to assist in diagnosis and management of assessed problem(s)

## 2024-01-10 NOTE — ASSESSMENT
[FreeTextEntry1] :  patient is now 1 week status post coronary bypass surgery for unstable angina with progressive disease in the left main and proximal LAD.  She underwent a two-vessel bypass with 2 arterial grafts radial to the ramus and LIMA to the LAD.  The chest wall seems to be healing well and her EKG is sinus rhythm with some nonspecific ST-T wave changes.  She is hemodynamically stable.   1.  Coronary artery disease status post PTCI and now status post coronary bypass surgery with arterial grafts  2.  Mixed mixed hyperlipidemia on Lipitor 80 8.  Essential hypertensionBlood pressure well-controlled on minimal medication presently e

## 2024-01-15 NOTE — COUNSELING
[Hygeine (Including Daily Shower)] : hygeine (including daily shower) [Importance of Regular Medical Follow-Up] : the importance of regular medical follow-up [No Heavy Lifting] : no heavy lifting (>15-20 lb. for 1 month or 25 lb. for 3 months from date of surgery) [Blood Pressure Control] : blood pressure control [Weight Management] : weight management [S/S of infection] : signs and symptoms of infection (and to whom it should be reported) [Progressive Ambulation/Activity] : progressive ambulation/activity [Medication/Vitamin/Herb/Food Interaction] : medication/vitamin/herb/food interaction [Smoking Cessation] : smoking cessation [Low Fat/Low Cholesterol Diet] : low fat/low cholesterol diet [Blood Sugar Control] : blood sugar control [Stress Management] : stress management

## 2024-01-16 ENCOUNTER — APPOINTMENT (OUTPATIENT)
Dept: CARDIOTHORACIC SURGERY | Facility: CLINIC | Age: 59
End: 2024-01-16
Payer: COMMERCIAL

## 2024-01-16 VITALS
OXYGEN SATURATION: 99 % | HEART RATE: 68 BPM | SYSTOLIC BLOOD PRESSURE: 108 MMHG | BODY MASS INDEX: 23.75 KG/M2 | RESPIRATION RATE: 14 BRPM | TEMPERATURE: 98.2 F | WEIGHT: 121 LBS | HEIGHT: 60 IN | DIASTOLIC BLOOD PRESSURE: 75 MMHG

## 2024-01-16 PROCEDURE — 99024 POSTOP FOLLOW-UP VISIT: CPT

## 2024-01-16 RX ORDER — SENNA 8.6 MG/1
8.6 TABLET, FILM COATED ORAL
Qty: 40 | Refills: 0 | Status: COMPLETED | COMMUNITY
Start: 2024-01-04 | End: 2024-01-16

## 2024-01-16 RX ORDER — SPIRONOLACTONE 25 MG/1
25 TABLET ORAL
Refills: 0 | Status: COMPLETED | COMMUNITY
Start: 2024-01-04 | End: 2024-01-16

## 2024-01-16 RX ORDER — FUROSEMIDE 40 MG/1
40 TABLET ORAL
Refills: 0 | Status: COMPLETED | COMMUNITY
Start: 2024-01-04 | End: 2024-01-16

## 2024-01-16 NOTE — PHYSICAL EXAM
[] : no respiratory distress [Respiration, Rhythm And Depth] : normal respiratory rhythm and effort [Exaggerated Use Of Accessory Muscles For Inspiration] : no accessory muscle use [Auscultation Breath Sounds / Voice Sounds] : lungs were clear to auscultation bilaterally [Apical Impulse] : the apical impulse was normal [Heart Rate And Rhythm] : heart rate was normal and rhythm regular [Heart Sounds] : normal S1 and S2 [Site: ___] : Site: [unfilled] [Clean] : clean [Dry] : dry [Healing Well] : healing well [No Edema] : no edema [Bleeding] : no active bleeding [Foul Odor] : no foul smell [Purulent Drainage] : no purulent drainage [Serosanguinous Drainage] : no serosanguinous drainage [Erythema] : not erythematous [Warm] : not warm [Tender] : not tender

## 2024-01-16 NOTE — ASSESSMENT
[FreeTextEntry1] : 58 year old female with reported PMHx of HTN, HLD, cardiac stentsx2, second-hand smoke exposure, presented for elective cardiac catheterization at [facility] with Dr. Pickett. The patient complained of midsternal chest pain, aggravated with exertion, found with multivessel disease. Patient was transferred to [facility] for CABG eval with Dr. Valdez.  12/29/23 - s/p CABG x 2 w/ LIMA/L radial. Post-Op course complicated by bleeding, requiring multiple products including 10 cryo. Norvasc for radial artery. DC on ASA only. Followed by Dr. Pickett of cardiology who she saw last week.    Presents today with her  and reports feeling well.  She was seen in ED and admitted from 1/6-1/7 for dizziness.  TTE without effusion, Diuretics stopped, Reports today she feels much better off diuretics and no more dizziness. Gradually progressing since DC and walking more everyday. Eating and drinking with +BM. Weights stable.  Some MSI pain/discomfort more at night with laying on back and trying to get comfortable.  No taking Oxy as she does not like taking pain meds.  Gets relief from Tylenol HS. Denies chest pain, SOB, swelling, weight gain, palpitations, cough, fever or chills.  Today on exam patient's lungs clear bilaterally, normal sinus rhythm, sternum stable, incision clean, dry and intact. Left Radial site is clean, dry and intact. No peripheral edema noted. Instructed patient on importance of optimal glycemic control, daily showering, daily weights, incentive spirometer use, and increase ambulation as tolerated. Instructed to call office with any signs or symptoms of infection or weight gain of 2 or more pounds in 1 day or 3 or more pounds in 1 week.   Plan:  - Continue current medication regimen (remain off diuretics) - Follow up with cardiologist Dr. Pickett - Follow up with PCP  - Continue to walk and increase as tolerated - Low salt diet and fluids discussed in detail - Tight glucose control discussed in detail for wound healing - Return to office PRN - Call with any questions or concerns

## 2024-02-01 ENCOUNTER — NON-APPOINTMENT (OUTPATIENT)
Age: 59
End: 2024-02-01

## 2024-02-02 RX ORDER — PANTOPRAZOLE 40 MG/1
40 TABLET, DELAYED RELEASE ORAL DAILY
Qty: 30 | Refills: 3 | Status: ACTIVE | COMMUNITY
Start: 2024-01-04 | End: 1900-01-01

## 2024-02-02 RX ORDER — AMLODIPINE BESYLATE 2.5 MG/1
2.5 TABLET ORAL DAILY
Qty: 90 | Refills: 3 | Status: ACTIVE | COMMUNITY
Start: 2024-01-04 | End: 1900-01-01

## 2024-02-02 RX ORDER — METOPROLOL TARTRATE 25 MG/1
25 TABLET, FILM COATED ORAL
Qty: 180 | Refills: 3 | Status: ACTIVE | COMMUNITY
Start: 2024-01-04 | End: 1900-01-01

## 2024-02-02 RX ORDER — ATORVASTATIN CALCIUM 80 MG/1
80 TABLET, FILM COATED ORAL
Qty: 90 | Refills: 3 | Status: ACTIVE | COMMUNITY
Start: 2023-09-13 | End: 1900-01-01

## 2024-03-13 ENCOUNTER — APPOINTMENT (OUTPATIENT)
Dept: CARDIOLOGY | Facility: CLINIC | Age: 59
End: 2024-03-13
Payer: COMMERCIAL

## 2024-03-13 VITALS
BODY MASS INDEX: 24.35 KG/M2 | SYSTOLIC BLOOD PRESSURE: 128 MMHG | DIASTOLIC BLOOD PRESSURE: 80 MMHG | WEIGHT: 124 LBS | OXYGEN SATURATION: 97 % | HEIGHT: 60 IN | HEART RATE: 67 BPM

## 2024-03-13 DIAGNOSIS — Z95.1 PRESENCE OF AORTOCORONARY BYPASS GRAFT: ICD-10-CM

## 2024-03-13 DIAGNOSIS — I10 ESSENTIAL (PRIMARY) HYPERTENSION: ICD-10-CM

## 2024-03-13 PROCEDURE — G2211 COMPLEX E/M VISIT ADD ON: CPT

## 2024-03-13 PROCEDURE — 99213 OFFICE O/P EST LOW 20 MIN: CPT

## 2024-03-13 PROCEDURE — 93000 ELECTROCARDIOGRAM COMPLETE: CPT

## 2024-03-13 RX ORDER — OXYCODONE 5 MG/1
5 TABLET ORAL EVERY 8 HOURS
Qty: 12 | Refills: 0 | Status: COMPLETED | COMMUNITY
Start: 2024-01-04 | End: 2024-03-13

## 2024-03-13 NOTE — DISCUSSION/SUMMARY
[EKG obtained to assist in diagnosis and management of assessed problem(s)] : EKG obtained to assist in diagnosis and management of assessed problem(s) [FreeTextEntry1] : 1.  I encouraged her to increase her level of activity.  She should increase her swimming speed and can even begin to bicycle again 2.  No cardiac contraindication to returning to work 3.  Continue present regimen of medication 4.  Follow-up with her internist for routine blood work Dr. Ritchie 5.  Follow-up with me in 4 months

## 2024-03-13 NOTE — ASSESSMENT
[FreeTextEntry1] :  patient is now 10 weeks status post coronary bypass surgery for unstable angina with progressive disease in the left main and proximal LAD.  She underwent a two-vessel bypass with 2 arterial grafts radial to the ramus and LIMA to the LAD.  She remains hemodynamically stable and now increasing her level of exercise starting with swimming without any recurrence of symptoms  1.  Coronary artery disease status post PTCI and now status post coronary bypass surgery with arterial grafts  2.  Mixed mixed hyperlipidemia on Lipitor 80 3.  Essential hypertension Blood pressure well-controlled on minimal medication  4.  Sternum well-healed 5.  No recurrence of angina or shortness of breath increasing her level of activity

## 2024-03-13 NOTE — HISTORY OF PRESENT ILLNESS
[FreeTextEntry1] :  Ansley and has a history of hyperlipidemia and hypertension and has a recent history of coronary artery disease with progressive angina.  She is status post of the  stent to the LAD x 2 but had recurrence of pain in December with an abnormal stress test.  She underwent repeat catheterization which revealed disease that progressed into the left main and proximal LAD.  She was transferred to [facility] where she underwent a two-vessel coronary bypass surgery with a LIMA to the LAD and a radial to the ramus.  She was discharged 1 week ago with an echo revealing normal left ventricular function with a loculated effusion no pericardial effusion   presently she complains of pain around the incision site.  She does not have any angina.  She is not dizzy or lightheaded.    EKG dated January10, 2024 normal sinus rhythm T inversions laterally in the precordial leads nonspecific   present medications include amlodipine 2.5 atorvastatin 80 CoQ 1000 metoprolol tartrate 25 mg twice a day oxycodone as needed pantoprazole 40 vitamin D3  Visit March 13, 2024 The patient is now 2-1/2 months status post multivessel coronary bypass surgery.  Her chest wall has healed quite well and she has begun increasing her activity.  She is swimming without any difficulty without shortness of breath or chest pain or any recurrence of the symptoms she had prior to her bypass surgery.  She remains on stable medication.  She has not gone back to work although we have given a clearance to proceed with work  Her medications presently include amlodipine 2.5 aspirin 81 atorvastatin 80 CoQ 10 metoprolol tartrate 25 mg every 12 hours pantoprazole vitamin D3

## 2024-03-13 NOTE — PHYSICAL EXAM
[Well Nourished] : well nourished [Normal Conjunctiva] : normal conjunctiva [No Carotid Bruit] : no carotid bruit [No Murmur] : no murmur [Normal S1, S2] : normal S1, S2 [Clear Lung Fields] : clear lung fields [Soft] : abdomen soft [No Edema] : no edema [Normal DP B/L] : normal DP B/L [Non Tender] : non-tender [de-identified] :  no acute distress sternotomy wound well-healed [de-identified] :  no rub [de-identified] :  no rales rhonchi or wheeze chest wall well-healed

## 2024-03-13 NOTE — REASON FOR VISIT
[FreeTextEntry1] : Ansley Lan 58 years old status post recent coronary bypass surgery here for follow-up of her cardiac status.  She was seen on March 13, 2024

## 2024-05-19 NOTE — H&P CARDIOLOGY - NS MD HP PULSE DORSALIS
Patient has had a GI upset with azithromycin previously. Discussed with MD and patient and patient agreeable to try medication along with zofran   right normal/left normal

## 2024-07-10 ENCOUNTER — APPOINTMENT (OUTPATIENT)
Dept: CARDIOLOGY | Facility: CLINIC | Age: 59
End: 2024-07-10
Payer: COMMERCIAL

## 2024-07-10 ENCOUNTER — NON-APPOINTMENT (OUTPATIENT)
Age: 59
End: 2024-07-10

## 2024-07-10 VITALS
OXYGEN SATURATION: 96 % | BODY MASS INDEX: 25.72 KG/M2 | WEIGHT: 131 LBS | DIASTOLIC BLOOD PRESSURE: 78 MMHG | SYSTOLIC BLOOD PRESSURE: 128 MMHG | HEART RATE: 60 BPM | HEIGHT: 60 IN

## 2024-07-10 DIAGNOSIS — R07.89 OTHER CHEST PAIN: ICD-10-CM

## 2024-07-10 DIAGNOSIS — Z95.1 PRESENCE OF AORTOCORONARY BYPASS GRAFT: ICD-10-CM

## 2024-07-10 PROCEDURE — 93000 ELECTROCARDIOGRAM COMPLETE: CPT

## 2024-07-10 PROCEDURE — G2211 COMPLEX E/M VISIT ADD ON: CPT | Mod: NC

## 2024-07-10 PROCEDURE — 99214 OFFICE O/P EST MOD 30 MIN: CPT | Mod: 25

## 2024-11-13 ENCOUNTER — APPOINTMENT (OUTPATIENT)
Dept: CARDIOLOGY | Facility: CLINIC | Age: 59
End: 2024-11-13
Payer: COMMERCIAL

## 2024-11-13 VITALS
DIASTOLIC BLOOD PRESSURE: 66 MMHG | HEART RATE: 59 BPM | SYSTOLIC BLOOD PRESSURE: 125 MMHG | BODY MASS INDEX: 25.52 KG/M2 | OXYGEN SATURATION: 95 % | HEIGHT: 60 IN | WEIGHT: 130 LBS

## 2024-11-13 DIAGNOSIS — I10 ESSENTIAL (PRIMARY) HYPERTENSION: ICD-10-CM

## 2024-11-13 DIAGNOSIS — Z95.1 PRESENCE OF AORTOCORONARY BYPASS GRAFT: ICD-10-CM

## 2024-11-13 DIAGNOSIS — E78.2 MIXED HYPERLIPIDEMIA: ICD-10-CM

## 2024-11-13 DIAGNOSIS — Z95.5 PRESENCE OF CORONARY ANGIOPLASTY IMPLANT AND GRAFT: ICD-10-CM

## 2024-11-13 PROCEDURE — 99204 OFFICE O/P NEW MOD 45 MIN: CPT

## 2024-11-13 PROCEDURE — G2211 COMPLEX E/M VISIT ADD ON: CPT | Mod: NC

## 2024-11-13 PROCEDURE — 99214 OFFICE O/P EST MOD 30 MIN: CPT

## 2025-05-07 ENCOUNTER — NON-APPOINTMENT (OUTPATIENT)
Age: 60
End: 2025-05-07

## 2025-05-09 ENCOUNTER — NON-APPOINTMENT (OUTPATIENT)
Age: 60
End: 2025-05-09

## 2025-05-09 ENCOUNTER — APPOINTMENT (OUTPATIENT)
Dept: CARDIOLOGY | Facility: CLINIC | Age: 60
End: 2025-05-09
Payer: COMMERCIAL

## 2025-05-09 VITALS
WEIGHT: 131 LBS | SYSTOLIC BLOOD PRESSURE: 114 MMHG | HEART RATE: 63 BPM | BODY MASS INDEX: 25.58 KG/M2 | DIASTOLIC BLOOD PRESSURE: 76 MMHG

## 2025-05-09 DIAGNOSIS — Z95.5 PRESENCE OF CORONARY ANGIOPLASTY IMPLANT AND GRAFT: ICD-10-CM

## 2025-05-09 DIAGNOSIS — Z95.1 PRESENCE OF AORTOCORONARY BYPASS GRAFT: ICD-10-CM

## 2025-05-09 DIAGNOSIS — R94.31 ABNORMAL ELECTROCARDIOGRAM [ECG] [EKG]: ICD-10-CM

## 2025-05-09 DIAGNOSIS — E78.2 MIXED HYPERLIPIDEMIA: ICD-10-CM

## 2025-05-09 PROCEDURE — G2211 COMPLEX E/M VISIT ADD ON: CPT | Mod: NC

## 2025-05-09 PROCEDURE — 93000 ELECTROCARDIOGRAM COMPLETE: CPT

## 2025-05-09 PROCEDURE — 99214 OFFICE O/P EST MOD 30 MIN: CPT

## 2025-05-12 LAB
25(OH)D3 SERPL-MCNC: 49.7 NG/ML
ALBUMIN SERPL ELPH-MCNC: 4.6 G/DL
ALP BLD-CCNC: 83 U/L
ALT SERPL-CCNC: 37 U/L
ANION GAP SERPL CALC-SCNC: 18 MMOL/L
AST SERPL-CCNC: 33 U/L
BILIRUB SERPL-MCNC: 0.9 MG/DL
BUN SERPL-MCNC: 17 MG/DL
CALCIUM SERPL-MCNC: 9.7 MG/DL
CHLORIDE SERPL-SCNC: 104 MMOL/L
CHOLEST SERPL-MCNC: 147 MG/DL
CK SERPL-CCNC: 64 U/L
CO2 SERPL-SCNC: 20 MMOL/L
CREAT SERPL-MCNC: 0.98 MG/DL
EGFRCR SERPLBLD CKD-EPI 2021: 67 ML/MIN/1.73M2
ESTIMATED AVERAGE GLUCOSE: 123 MG/DL
GLUCOSE SERPL-MCNC: 88 MG/DL
HBA1C MFR BLD HPLC: 5.9 %
HCT VFR BLD CALC: 42 %
HDLC SERPL-MCNC: 43 MG/DL
HGB BLD-MCNC: 14 G/DL
LDLC SERPL-MCNC: 65 MG/DL
MCHC RBC-ENTMCNC: 29.4 PG
MCHC RBC-ENTMCNC: 33.3 G/DL
MCV RBC AUTO: 88.2 FL
NONHDLC SERPL-MCNC: 104 MG/DL
PLATELET # BLD AUTO: 213 K/UL
POTASSIUM SERPL-SCNC: 4.7 MMOL/L
PROT SERPL-MCNC: 7 G/DL
RBC # BLD: 4.76 M/UL
RBC # FLD: 12.7 %
SODIUM SERPL-SCNC: 142 MMOL/L
TRIGL SERPL-MCNC: 239 MG/DL
WBC # FLD AUTO: 6.08 K/UL

## 2025-05-14 ENCOUNTER — APPOINTMENT (OUTPATIENT)
Dept: CARDIOLOGY | Facility: CLINIC | Age: 60
End: 2025-05-14

## (undated) DEVICE — GOWN XXXL

## (undated) DEVICE — BLADE SCALPEL SAFETYLOCK #15

## (undated) DEVICE — VASOVIEW HEMOPRO 2

## (undated) DEVICE — DRSG OPSITE 13.75 X 4"

## (undated) DEVICE — SOL ANTI FOG

## (undated) DEVICE — MARKING PEN W RULER

## (undated) DEVICE — DRAIN RESERVOIR FOR JACKSON PRATT 100CC CARDINAL

## (undated) DEVICE — SENSOR MYOCARDIAL TEMP 15MM

## (undated) DEVICE — SOL IRR POUR H2O 250ML

## (undated) DEVICE — PACK UNIVERSAL CARDIAC SUPPLEMNTAL B

## (undated) DEVICE — SYNOVIS VASCULAR PROBE 1.5MM 15CM

## (undated) DEVICE — TUBING TUR 2 PRONG

## (undated) DEVICE — SYS VEIN HARVESTING VIRTUOSAPH PLUS W/ RADIAL

## (undated) DEVICE — BLADE SCALPEL SAFETYLOCK #11

## (undated) DEVICE — VENODYNE/SCD SLEEVE CALF MEDIUM

## (undated) DEVICE — WARMING BLANKET DUO-THERM HYPER/HYPOTHERM ADULT

## (undated) DEVICE — PHRENIC NERVE PAD MEDIUM

## (undated) DEVICE — SUT PROLENE 6-0 4-30" C-1

## (undated) DEVICE — GLV 8 PROTEXIS ORTHO (CREAM)

## (undated) DEVICE — PACK UNIVERSAL CARDIAC

## (undated) DEVICE — SUT BLUNT SZ 5

## (undated) DEVICE — GOWN TRIMAX LG

## (undated) DEVICE — SUT PLEDGET PRE PUNCH 4.8 X 9.5 X 1.5 MM

## (undated) DEVICE — SUT DOUBLE 6 WIRE STERNAL

## (undated) DEVICE — SUT SURGICAL STEEL 6 30" BP-1

## (undated) DEVICE — DRAPE INSTRUMENT POUCH 6.75" X 11"

## (undated) DEVICE — GOWN TRIMAX XXL

## (undated) DEVICE — SUT PROLENE 7-0 4-24" BV-1

## (undated) DEVICE — SUT STAINLESS STEEL 5 18" SCC

## (undated) DEVICE — SUT SOFSILK 0 30" V-20

## (undated) DEVICE — SOL IRR BAG NS 0.9% 3000ML

## (undated) DEVICE — STEALTH CLAMP INSERT FIBRA/FIBRA 60MM

## (undated) DEVICE — SUT PROLENE 3-0 36" SH

## (undated) DEVICE — TUBING TRUWAVE PRESSURE MALE/FEMALE 72"

## (undated) DEVICE — SUT POLYSORB 4-0 30" CVF-23

## (undated) DEVICE — SUT PROLENE 8-0 24" BV175-6

## (undated) DEVICE — CONNECTOR STRAIGHT 3/8 X 1/2"

## (undated) DEVICE — SUT PROLENE 4-0 36" RB-1

## (undated) DEVICE — BEAVER BLADE MINI SHARP ALL ROUND (BLUE)

## (undated) DEVICE — POSITIONER FOAM EGG CRATE ULNAR 2PCS (PINK)

## (undated) DEVICE — FOLEY TRAY 16FR 5CC LF LUBRISIL ADVANCE TEMP CLOSED

## (undated) DEVICE — DRSG ACE BANDAGE 6"

## (undated) DEVICE — TUBING ATS SUCTION LINE

## (undated) DEVICE — DRSG STOCKINETTE IMPERVIOUS XL

## (undated) DEVICE — SYR LUER LOK 20CC

## (undated) DEVICE — DRAPE TOWEL BLUE 17" X 24"

## (undated) DEVICE — AORTIC PUNCH 4.0MM STANDARD HANDLE

## (undated) DEVICE — DRAPE SLUSH / WARMER 44 X 66"

## (undated) DEVICE — SOL IRR POUR NS 0.9% 500ML

## (undated) DEVICE — ELCTR BOVIE TIP BLADE VALLEYLAB 6.5"

## (undated) DEVICE — GLV 7 PROTEXIS (WHITE)

## (undated) DEVICE — SUMP PERICARDIAL 20FR 1/4" ADULT

## (undated) DEVICE — DRAPE IOBAN 23" X 23"

## (undated) DEVICE — GLV 6.5 PROTEXIS (WHITE)

## (undated) DEVICE — DRAIN CHANNEL 19FR ROUND FULL FLUTED

## (undated) DEVICE — POSITIONER CARDIAC BUMP

## (undated) DEVICE — SUCTION CATH ARGYLE WHISTLE TIP 14FR STRAIGHT PACKED

## (undated) DEVICE — SUT BOOT STANDARD (ASSORTED) 5 PAIR

## (undated) DEVICE — AORTIC PUNCH 5MM STANDARD HANDLE

## (undated) DEVICE — NDL BLUNT 18G LOOP VESSEL MAXI WHITE

## (undated) DEVICE — SPECIMEN CONTAINER 100ML

## (undated) DEVICE — WARMING BLANKET UPPER ADULT

## (undated) DEVICE — STRYKER INTERPULSE HANDPIECE W IRR SUCTION TUBE

## (undated) DEVICE — SUT PROLENE 4-0 36" BB

## (undated) DEVICE — WARMING BLANKET FULL ADULT

## (undated) DEVICE — SYR ASEPTO

## (undated) DEVICE — CONNECTOR INTERSEPT "Y" 1/4 X 1/4 X 1/4"

## (undated) DEVICE — SUT POLYSORB 2 30" GS-26

## (undated) DEVICE — TUBING INSUFFLATION LAP FILTER 10FT

## (undated) DEVICE — TUBING SUCTION 20FT

## (undated) DEVICE — VISITEC 4X4

## (undated) DEVICE — SUT POLYSORB 0 36" GS-25 UNDYED

## (undated) DEVICE — SUT SOFSILK 2 60" TIES

## (undated) DEVICE — SUCTION YANKAUER NO CONTROL VENT

## (undated) DEVICE — BLADE SCALPEL SAFETYLOCK #10

## (undated) DEVICE — AORTIC PUNCH 4.0MM LONG LENGTH HANDLE

## (undated) DEVICE — DRSG OPSITE 2.5 X 2"

## (undated) DEVICE — DRSG TEGADERM 6"X8"

## (undated) DEVICE — DRAPE 1/2 SHEET 40X57"

## (undated) DEVICE — MEDICATION LABELS W MARKER

## (undated) DEVICE — DRAPE 3/4 SHEET W REINFORCEMENT 56X77"

## (undated) DEVICE — PREP CHLORAPREP HI-LITE ORANGE 26ML

## (undated) DEVICE — VESSEL LOOP MAXI-RED  0.120" X 16"

## (undated) DEVICE — GLV 7.5 PROTEXIS (WHITE)

## (undated) DEVICE — BULLDOG SPRING CLIP 6MM SOFT/SOFT

## (undated) DEVICE — SUT PROLENE 7-0 24" BV175-6

## (undated) DEVICE — CUFF BLOOD PRESSURE ADULT LG

## (undated) DEVICE — DRSG DERMABOND PRINEO 60CM

## (undated) DEVICE — FEEDING TUBE NG SUMP 16FR 48"

## (undated) DEVICE — NDL COUNTER FOAM AND MAGNET 40-70

## (undated) DEVICE — GLV 8.5 PROTEXIS (WHITE)

## (undated) DEVICE — PACING CABLE (BROWN) A/V TEMP SCREW DOWN 12FT

## (undated) DEVICE — SOL NORMOSOL-R PH7.4 1000ML

## (undated) DEVICE — DRAPE MAYO STAND 30"

## (undated) DEVICE — CONNECTOR "Y" 1/2 X 3/8 X 3/8"

## (undated) DEVICE — ELCTR AQUAMANTYS BIPOLAR SEALER 6.0

## (undated) DEVICE — DOPPLER PROBE 20MHZ DISP

## (undated) DEVICE — SUT BIOSYN 4-0 18" P-12

## (undated) DEVICE — GLV 8 PROTEXIS (WHITE)

## (undated) DEVICE — VENTING ADAPTER "Y" (RED/BLUE) 7.5"

## (undated) DEVICE — BLOWER MISTER VIPER II

## (undated) DEVICE — DRAPE LIGHT HANDLE COVER (BLUE)

## (undated) DEVICE — DRSG STERISTRIPS 0.5 X 4"

## (undated) DEVICE — TOURNIQUET SET 12FR (1 RED, 1 BLUE, 1 SNARE) 7"

## (undated) DEVICE — PACING CABLE (BLUE) ATRIAL TEMP SCREW DOWN 12FT

## (undated) DEVICE — DRAIN CHANNEL 32FR ROUND HUBLESS FULL FLUTED

## (undated) DEVICE — MULTIPLE PERFUSION SET FEMALE 1 INLET LEG W 4 LEGS 15" (BLUE/RED)

## (undated) DEVICE — CHEST DRAIN PLEUR-EVAC WET/WET ADULT-PEDS SINGLE (QUICK)

## (undated) DEVICE — SUT POLYSORB 2-0 30" GS-21 UNDYED

## (undated) DEVICE — DRSG KLING 6"

## (undated) DEVICE — SAW BLADE MICROAIRE STERNUM 1X34X9.4MM

## (undated) DEVICE — LAP PAD 18 X 18"

## (undated) DEVICE — STEALTH CLAMP INSERT FIBRA/FIBRA 90MM

## (undated) DEVICE — STAPLER SKIN VISI-STAT 35 WIDE

## (undated) DEVICE — SUT TICRON 5 30" KV-40